# Patient Record
Sex: FEMALE | Race: WHITE | Employment: OTHER | URBAN - METROPOLITAN AREA
[De-identification: names, ages, dates, MRNs, and addresses within clinical notes are randomized per-mention and may not be internally consistent; named-entity substitution may affect disease eponyms.]

---

## 2017-04-13 ENCOUNTER — OFFICE VISIT (OUTPATIENT)
Dept: FAMILY MEDICINE | Facility: CLINIC | Age: 57
End: 2017-04-13

## 2017-04-13 VITALS
TEMPERATURE: 98.2 F | HEART RATE: 76 BPM | WEIGHT: 140 LBS | HEIGHT: 64 IN | OXYGEN SATURATION: 98 % | BODY MASS INDEX: 23.9 KG/M2 | RESPIRATION RATE: 16 BRPM | SYSTOLIC BLOOD PRESSURE: 110 MMHG | DIASTOLIC BLOOD PRESSURE: 70 MMHG

## 2017-04-13 DIAGNOSIS — Z12.31 ENCOUNTER FOR SCREENING MAMMOGRAM FOR BREAST CANCER: ICD-10-CM

## 2017-04-13 DIAGNOSIS — Z01.411 ENCOUNTER FOR GYNECOLOGICAL EXAMINATION WITH ABNORMAL FINDING: Primary | ICD-10-CM

## 2017-04-13 DIAGNOSIS — Z13.1 SCREENING FOR DIABETES MELLITUS: ICD-10-CM

## 2017-04-13 DIAGNOSIS — Z11.59 NEED FOR HEPATITIS C SCREENING TEST: ICD-10-CM

## 2017-04-13 DIAGNOSIS — N95.1 SYMPTOMATIC MENOPAUSAL OR FEMALE CLIMACTERIC STATES: ICD-10-CM

## 2017-04-13 LAB
HBA1C MFR BLD: 5.2 % (ref 4–6)
HEMOGLOBIN: 14.7 G/DL (ref 11.7–15.7)

## 2017-04-13 PROCEDURE — 85018 HEMOGLOBIN: CPT | Performed by: FAMILY MEDICINE

## 2017-04-13 PROCEDURE — 99396 PREV VISIT EST AGE 40-64: CPT | Performed by: FAMILY MEDICINE

## 2017-04-13 PROCEDURE — 88142 CYTOPATH C/V THIN LAYER: CPT | Mod: 90 | Performed by: FAMILY MEDICINE

## 2017-04-13 PROCEDURE — 36415 COLL VENOUS BLD VENIPUNCTURE: CPT | Performed by: FAMILY MEDICINE

## 2017-04-13 PROCEDURE — 86803 HEPATITIS C AB TEST: CPT | Mod: 90 | Performed by: FAMILY MEDICINE

## 2017-04-13 PROCEDURE — 83036 HEMOGLOBIN GLYCOSYLATED A1C: CPT | Performed by: FAMILY MEDICINE

## 2017-04-13 NOTE — PROGRESS NOTES
"SUBJECTIVE:  Gaby Aguilar is an 56 year old  postmenopausal woman   who presents for annual gyn exam. Menopause at age 49. No   bleeding, spotting, or discharge noted.     Estrogen replacement therapy: none   MARY exposure: no  History of abnormal Pap smear: No  Family history of uterine or ovarian cancer: No  Regular self breast exam: Yes  History of abnormal mammogram: No  Family history of breast cancer: No  History of abnormal lipids: Yes: low HDL    Past Medical History:  No date: NO ACTIVE PROBLEMS      Family History    CANCER Mother     Comment: presacral cancer    C.A.D. No family hx of     DIABETES Maternal Grandmother     Hypertension Mother     Hypertension Father     CEREBROVASCULAR DISEASE No family hx of     Breast Cancer Maternal Aunt     Cancer - colorectal No family hx of     Prostate Cancer Father        Past Surgical History:  1978: HC CORRECT BUNION,SIMPLE  age 20: HC TOOTH EXTRACTION W/FORCEP    Current Outpatient Prescriptions:  VITAMIN E NATURAL PO   BIOTIN FORTE PO   Calcium Carbonate-Vitamin D (CALCIUM + D) 600-200 MG-UNIT per tablet   fish oil-omega-3 fatty acids (FISH OIL) 1000 MG capsule   Multiple Vitamins-Calcium (ONE-A-DAY WOMENS FORMULA) TABS   No current facility-administered medications for this visit.    -- Sulfa Drugs -- Hives       Smoking status: Never Smoker    Smokeless tobacco: Never Used    Alcohol use Yes  0.5 oz/week    1 drink(s) per week         Comment: moderate       Review Of Systems  Ears/Nose/Throat: allergies year around  Respiratory: No shortness of breath, dyspnea on exertion, cough, or hemoptysis  Cardiovascular: negative  Gastrointestinal: negative and normal colonoscopy  Genitourinary: negative    OBJECTIVE:  /70 (BP Location: Right arm, Patient Position: Chair, Cuff Size: Adult Large)  Pulse 76  Temp 98.2  F (36.8  C) (Oral)  Resp 16  Ht 1.619 m (5' 3.75\")  Wt 63.5 kg (140 lb)  LMP 2010  SpO2 98%  BMI 24.22 kg/m2  General appearance: " healthy, alert, no distress, cooperative and smiling  Skin: Skin color, texture, turgor normal. No rashes or lesions., positives: dry  Ears: negative  Nose/Sinuses: Nares normal. Septum midline. Mucosa normal. No drainage or sinus tenderness.  Oropharynx: Lips, mucosa, and tongue normal. Teeth and gums normal.  Neck: Neck supple. No adenopathy. Thyroid symmetric, normal size,, Carotids without bruits.  Lungs: negative, Percussion normal. Good diaphragmatic excursion. Lungs clear  Heart: negative, PMI normal. No lifts, heaves, or thrills. RRR. No murmurs, clicks gallops or rub  Breasts: Inspection negative. No nipple discharge or bleeding. No masses.  Abdomen: Abdomen soft, non-tender. BS normal. No masses, organomegaly  Pelvic: External genitalia and vagina normal. Bimanual and rectovaginal normal., positive findings:  vaginal mucosa atrophy  BMI : Body mass index is 24.22 kg/(m^2).    ASSESSMENT:(Z01.411) Encounter for gynecological examination with abnormal finding  (primary encounter diagnosis)  Plan: ThinPrep Pap and HPV (mRNA E6/E7)         ([facility]), CL AFF HEMOGLOBIN (BFP), VENOUS         COLLECTION        Total calcium intake of 1500 mgm/day, vitamin D 400-800IU/day and a regular weight bearing exercise program for prevention of osteoporosis is recommended treatment at this time.      (N95.1) Symptomatic menopausal or female climacteric states  Plan: VITAMIN E NATURAL PO, BIOTIN FORTE PO        I have reviewed the patient's medical history in detail and updated the computerized patient record.      (Z12.31) Encounter for screening mammogram for breast cancer  Plan: Mammo Screening digital (bilat), RADIOLOGY         REFERRAL            (Z13.1) Screening for diabetes mellitus  Plan: Hemoglobin A1c (BFP), VENOUS COLLECTION            (Z11.59) Need for hepatitis C screening test  Plan: Hepatits C antibody ([facility]), VENOUS COLLECTION                Dx:  1)  Pap smear, mammogram  2)  Lipids at appropriate  intervals  PE:  Reviewed health maintenance including diet, regular exercise,   estrogen replacement and periodic exams.

## 2017-04-13 NOTE — MR AVS SNAPSHOT
After Visit Summary   4/13/2017    aGby Aguilar    MRN: [id number]           Patient Information     Date Of Birth          1960        Visit Information        Provider Department      4/13/2017 9:00 AM Reshma Enriquez MD [facility]        Today's Diagnoses     Encounter for gynecological examination with abnormal finding    -  1    Symptomatic menopausal or female climacteric states        Encounter for screening mammogram for breast cancer        Screening for diabetes mellitus        Need for hepatitis C screening test          Care Instructions    Total calcium intake of 1500 mgm/day, vitamin D 400-800IU/day and a regular weight bearing exercise program for prevention of osteoporosis is recommended treatment at this time.            Follow-ups after your visit        Additional Services     RADIOLOGY REFERRAL       Your provider has referred you to: N: [facility] [place] [phone]   [email or web address]    Please be aware that coverage of these services is subject to the terms and limitations of your health insurance plan.  Call member services at your health plan with any benefit or coverage questions.      Please bring the following to your appointment:    >>   Any x-rays, CTs or MRIs which have been performed.  Contact the facility where they were done to arrange for  prior to your scheduled appointment.    >>   List of current medications   >>   This referral request   >>   Any documents/labs given to you for this referral    Prior authorization is required for MRI/MRA, CT, Dexa Scans and Worker's Compensation cases.                  Follow-up notes from your care team     Return in about 1 year (around 4/13/2018).      Future tests that were ordered for you today     Open Future Orders        Priority Expected Expires Ordered    Mammo Screening digital (bilat) Routine  4/13/2018 4/13/2017            Who to contact     If you have  "questions or need follow up information about today's clinic visit or your schedule please contact [facility] directly at [phone].  Normal or non-critical lab and imaging results will be communicated to you by MyChart, letter or phone within 4 business days after the clinic has received the results. If you do not hear from us within 7 days, please contact the clinic through [employer] or phone. If you have a critical or abnormal lab result, we will notify you by phone as soon as possible.  Submit refill requests through oBaz or call your pharmacy and they will forward the refill request to us. Please allow 3 business days for your refill to be completed.          Additional Information About Your Visit        [employer]Lab Information     oBaz gives you secure access to your electronic health record. If you see a primary care provider, you can also send messages to your care team and make appointments. If you have questions, please call your primary care clinic.  If you do not have a primary care provider, please call [phone] and they will assist you.        Care EveryWhere ID     This is your Care EveryWhere ID. This could be used by other organizations to access your [place] medical records  [id number]        Your Vitals Were     Pulse Temperature Respirations Height Last Period Pulse Oximetry    76 98.2  F (36.8  C) (Oral) 16 1.619 m (5' 3.75\") 05/26/2010 98%    BMI (Body Mass Index)                   24.22 kg/m2            Blood Pressure from Last 3 Encounters:   04/13/17 110/70   10/05/15 114/72   07/27/15 118/78    Weight from Last 3 Encounters:   04/13/17 63.5 kg (140 lb)   10/05/15 63.5 kg (140 lb)   07/27/15 63.5 kg (140 lb)              We Performed the Following     CL AFF HEMOGLOBIN (BFP)     Hemoglobin A1c (BFP)     Hepatits C antibody ([facility])     RADIOLOGY REFERRAL     ThinPrep Pap and HPV (mRNA E6/E7){HPV-REFLEX} ([facility])     VENOUS COLLECTION        Primary Care " Provider Office Phone # Fax #    Reshma Enriquez [phone]       [facility] [address]  [facility] [address]        Thank you!     Thank you for choosing [facility]  for your care. Our goal is always to provide you with excellent care. Hearing back from our patients is one way we can continue to improve our services. Please take a few minutes to complete the written survey that you may receive in the mail after your visit with us. Thank you!             Your Updated Medication List - Protect others around you: Learn how to safely use, store and throw away your medicines at www.disposemymeds.org.          This list is accurate as of: 4/13/17 10:00 AM.  Always use your most recent med list.                   Brand Name Dispense Instructions for use    BIOTIN FORTE PO          calcium + D 600-200 MG-UNIT Tabs   Generic drug:  calcium carbonate-vitamin D      Take 2 tablets by mouth 2 times daily.       fish oil-omega-3 fatty acids 1000 MG capsule      Take 2 g by mouth daily.       ONE-A-DAY WOMENS FORMULA Tabs      Take 1 tablet by mouth daily.       VITAMIN E NATURAL PO

## 2017-04-13 NOTE — PATIENT INSTRUCTIONS
Total calcium intake of 1500 mgm/day, vitamin D 400-800IU/day and a regular weight bearing exercise program for prevention of osteoporosis is recommended treatment at this time.

## 2017-04-13 NOTE — NURSING NOTE
Patient is here for a full physical exam and pap.  Pre-Visit Screening :  Immunizations : up to date    Colonoscopy : is up to date  Mammogram : is due and to be scheduled by patient for later completion  Asthma Action Plan/Test : na  PHQ9/GAD7 : na  Pulse - regular  Medication Reconciliation: complete    BP done on the right arm, with a lg sized cuff.  Pulse - regular  My Chart - accepts    CLASSIFICATION OF OVERWEIGHT AND OBESITY BY BMI                         Obesity Class           BMI(kg/m2)  Underweight                                    < 18.5  Normal                                         18.5-24.9  Overweight                                     25.0-29.9  OBESITY                     I                  30.0-34.9                              II                 35.0-39.9  EXTREME OBESITY             III                >40                             Patient's  BMI Body mass index is 24.22 kg/(m^2).  http://hin.nhlbi.nih.gov/menuplanner/menu.cgi  Questioned patient about current smoking habits.  Pt. has never smoked.  ETOH screening:  Questions:  1-How often do you have a drink containing alcohol?                             1 times per week(s)  2-How many drinks containing alcohol do you have on a typical day when you are         Drinking?                              1   3- How often do you have 5 or more drinks on one occasion?                              never per never    Have you ever:  @None of the patient's responses to the CAGE screening were positive / Negative CAGE score@

## 2017-04-14 LAB
HCV AB - QUEST: NORMAL
SIGNAL TO CUT OFF - QUEST: 0.12

## 2017-04-18 LAB
CLINICAL HISTORY - QUEST: NORMAL
CYTOTECHNOLOGIST - QUEST: NORMAL
DESCRIPTIVE DIAGNOSIS - QUEST: NORMAL
LAST PAP DX - QUEST: NORMAL
LMP - QUEST: NORMAL
PREV BX DX - QUEST: NORMAL
SOURCE: NORMAL
STATEMENT OF ADEQUACY - QUEST: NORMAL

## 2017-07-13 ENCOUNTER — MYC MEDICAL ADVICE (OUTPATIENT)
Dept: FAMILY MEDICINE | Facility: CLINIC | Age: 57
End: 2017-07-13

## 2017-12-23 ENCOUNTER — HEALTH MAINTENANCE LETTER (OUTPATIENT)
Age: 57
End: 2017-12-23

## 2017-12-30 ENCOUNTER — TRANSFERRED RECORDS (OUTPATIENT)
Dept: FAMILY MEDICINE | Facility: CLINIC | Age: 57
End: 2017-12-30

## 2018-03-16 ENCOUNTER — OFFICE VISIT (OUTPATIENT)
Dept: FAMILY MEDICINE | Facility: CLINIC | Age: 58
End: 2018-03-16

## 2018-03-16 VITALS
SYSTOLIC BLOOD PRESSURE: 136 MMHG | HEIGHT: 64 IN | RESPIRATION RATE: 20 BRPM | WEIGHT: 142.4 LBS | DIASTOLIC BLOOD PRESSURE: 82 MMHG | TEMPERATURE: 97.8 F | BODY MASS INDEX: 24.31 KG/M2 | HEART RATE: 76 BPM

## 2018-03-16 DIAGNOSIS — R10.2 PELVIC PAIN IN FEMALE: Primary | ICD-10-CM

## 2018-03-16 DIAGNOSIS — R93.5 ABNORMAL ENDOMETRIAL ULTRASOUND: ICD-10-CM

## 2018-03-16 DIAGNOSIS — Z12.31 ENCOUNTER FOR SCREENING MAMMOGRAM FOR BREAST CANCER: ICD-10-CM

## 2018-03-16 LAB
% GRANULOCYTES: 70.6 %
ALBUMIN (URINE): ABNORMAL MG/DL
APPEARANCE UR: CLEAR
BACTERIA, UR: ABNORMAL
BILIRUB UR QL: ABNORMAL
CASTS/LPF: ABNORMAL
COLOR UR: YELLOW
EP/HPF: ABNORMAL
ERYTHROCYTE [SEDIMENTATION RATE] IN BLOOD: 10 MM/HR (ref 0–20)
GLUCOSE URINE: ABNORMAL MG/DL
HCT VFR BLD AUTO: 42.4 % (ref 35–47)
HEMOGLOBIN: 14 G/DL (ref 11.7–15.7)
HGB UR QL: ABNORMAL
KETONES UR QL: ABNORMAL MG/DL
LEUKOCYTE ESTERASE - QUEST: ABNORMAL
LYMPHOCYTES NFR BLD AUTO: 22.7 %
MAMMOGRAM: NORMAL
MCH RBC QN AUTO: 31 PG (ref 26–33)
MCHC RBC AUTO-ENTMCNC: 33 G/DL (ref 31–36)
MCV RBC AUTO: 94.1 FL (ref 78–100)
MISC.: ABNORMAL
MONOCYTES NFR BLD AUTO: 6.7 %
NITRITE UR QL STRIP: ABNORMAL
PH UR STRIP: 6 PH (ref 5–7)
PLATELET COUNT - QUEST: 317 10^9/L (ref 150–375)
RBC # BLD AUTO: 4.51 10*12/L (ref 3.8–5.2)
RBC, UR MICRO: ABNORMAL (ref ?–2)
SP. GRAVITY: >=1.03
UROBILINOGEN UR QL STRIP: 0.2 EU/DL (ref 0.2–1)
WBC # BLD AUTO: 5.9 10*9/L (ref 4–11)
WBC, UR MICRO: ABNORMAL (ref ?–2)

## 2018-03-16 PROCEDURE — 85025 COMPLETE CBC W/AUTO DIFF WBC: CPT | Performed by: FAMILY MEDICINE

## 2018-03-16 PROCEDURE — 99214 OFFICE O/P EST MOD 30 MIN: CPT | Performed by: FAMILY MEDICINE

## 2018-03-16 PROCEDURE — 81001 URINALYSIS AUTO W/SCOPE: CPT | Performed by: FAMILY MEDICINE

## 2018-03-16 PROCEDURE — 36415 COLL VENOUS BLD VENIPUNCTURE: CPT | Performed by: FAMILY MEDICINE

## 2018-03-16 PROCEDURE — 85651 RBC SED RATE NONAUTOMATED: CPT | Performed by: FAMILY MEDICINE

## 2018-03-16 NOTE — PROGRESS NOTES
"SUBJECTIVE:  57 year old  female presents with the following concern:    Lower abdominal discomfort, described as Cramps  Intermittent  Feels like pressure  Painful jabs on occasion  Not severe enough to wake from sleep  No change in appetite  No urinary symptoms  No vaginal bleeding or discharge  No fever or chills  No change in bowel habits  Normal colonoscopy in 2011  Symptoms are not getting worse- FEW MONTH DURATION  Frequency: intermittent, Three days this week       Patient Active Problem List   Diagnosis     Symptomatic menopausal or female climacteric states     Allergic rhinitis     Absence of menstruation     Health Care Home     ACP (advance care planning)     Diffuse cystic mastopathy     Atrophic vaginitis     Past Surgical History:   Procedure Laterality Date     HC CORRECT BUNION,SIMPLE  1978     HC TOOTH EXTRACTION W/FORCEP  age 20     Current Outpatient Prescriptions   Medication     VITAMIN E NATURAL PO     BIOTIN FORTE PO     Calcium Carbonate-Vitamin D (CALCIUM + D) 600-200 MG-UNIT per tablet     fish oil-omega-3 fatty acids (FISH OIL) 1000 MG capsule     Multiple Vitamins-Calcium (ONE-A-DAY WOMENS FORMULA) TABS     No current facility-administered medications for this visit.         ROS: 7 point ROS neg other than the symptoms noted above in the HPI.    OBJECTIVE:  /82 (BP Location: Left arm, Patient Position: Chair, Cuff Size: Adult Regular)  Pulse 76  Temp 97.8  F (36.6  C) (Oral)  Resp 20  Ht 1.619 m (5' 3.75\")  Wt 64.6 kg (142 lb 6.4 oz)  LMP 05/26/2010  BMI 24.63 kg/m2  Regular rate and  rhythm. S1 and S2 normal, no murmurs, clicks, gallops or rubs. No edema or JVD. Chest is clear; no wheezes or rales.  The abdomen is soft without  guarding, mass or organomegaly. Bowel sounds are normal. Mild tenderness to palpation left lower quadrant  No pelvic exam today:  Last pap and pelvic 4/2017    UA: normal but for concentrated urine  Normal CBC- WBC 5900  ESR: 10    Pelvic US: " Abnormally thickened endometrium with scattered calcifications. Although no gross tumor is seen, endometrial biopsy  is suggested for further evaluation.  2. Atrophic ovaries with calcification in the right ovary of indeterminate etiology.    Assessment   Pelvic cramping: abnormal pelvic US    PLAN:  Patient has been notified of her results and need for further evaluation including endometrial biopsy  [facility]

## 2018-03-16 NOTE — MR AVS SNAPSHOT
After Visit Summary   3/16/2018    Gaby Aguilar    MRN: [id number]           Patient Information     Date Of Birth          1960        Visit Information        Provider Department      3/16/2018 8:45 AM Isabella Abebe MD [facility]        Today's Diagnoses     Pelvic pain in female    -  1    Abnormal endometrial ultrasound        Encounter for screening mammogram for breast cancer           Follow-ups after your visit        Additional Services     OB/GYN REFERRAL       Your provider has referred you to:  [facility] [place] [phone]   [email or web address]    PLEASE FAX OFFICE NOTE AND PELVIC ULTRASOUND RESULTS   PATIENT NEEDS TO BE SCHEDULED FOR ENDOMETRIAL BIOPSY AND CONSULT  I TOLD PATIENT THAT THE OFFICE WOULD CALL HER TO SCHEDULE  [phone] (home) [phone] (work)      Please be aware that coverage of these services is subject to the terms and limitations of your health insurance plan.  Call member services at your health plan with any benefit or coverage questions.      Please bring the following with you to your appointment:    (1) Any X-Rays, CTs or MRIs which have been performed.  Contact the facility where they were done to arrange for  prior to your scheduled appointment.   (2) List of current medications   (3) This referral request   (4) Any documents/labs given to you for this referral                  Who to contact     If you have questions or need follow up information about today's clinic visit or your schedule please contact [facility] directly at [phone].  Normal or non-critical lab and imaging results will be communicated to you by MyChart, letter or phone within 4 business days after the clinic has received the results. If you do not hear from us within 7 days, please contact the clinic through MyChart or phone. If you have a critical or abnormal lab result, we will  "notify you by phone as soon as possible.  Submit refill requests through Auramist or call your pharmacy and they will forward the refill request to us. Please allow 3 business days for your refill to be completed.          Additional Information About Your Visit        [email or web address] Information     [employer]ist gives you secure access to your electronic health record. If you see a primary care provider, you can also send messages to your care team and make appointments. If you have questions, please call your primary care clinic.  If you do not have a primary care provider, please call [phone] and they will assist you.        Care EveryWhere ID     This is your Care EveryWhere ID. This could be used by other organizations to access your [place] medical records  [id number]        Your Vitals Were     Pulse Temperature Respirations Height Last Period BMI (Body Mass Index)    76 97.8  F (36.6  C) (Oral) 20 1.619 m (5' 3.75\") 05/26/2010 24.63 kg/m2       Blood Pressure from Last 3 Encounters:   03/16/18 136/82   04/13/17 110/70   10/05/15 114/72    Weight from Last 3 Encounters:   03/16/18 64.6 kg (142 lb 6.4 oz)   04/13/17 63.5 kg (140 lb)   10/05/15 63.5 kg (140 lb)              We Performed the Following     CL AFF HEMOGRAM/PLATE/DIFF (BFP)     ESR, WESTERGREN (BFP)     HCL  Urinalysis, Routine (BFP)     Mammo Screening digital (bilat)     OB/GYN REFERRAL     US Pelvic Complete with Transvaginal     VENOUS COLLECTION        Primary Care Provider Office Phone # Fax #    Reshma Enriquez [phone] [address] [address]        Equal Access to Services     [facility]: Hadii britton Ferrell, wacalda luqadaha, qaybta kaalmada pricila, romero mcnulty. So [facility] [phone].    ATENCIÓN: Si habla español, tiene a tillman disposición servicios gratuitos de asistencia lingüística. Llame al [phone].    We comply with applicable federal civil " rights laws and [place] laws. We do not discriminate on the basis of race, color, national origin, age, disability, sex, sexual orientation, or gender identity.            Thank you!     Thank you for choosing [facility]  for your care. Our goal is always to provide you with excellent care. Hearing back from our patients is one way we can continue to improve our services. Please take a few minutes to complete the written survey that you may receive in the mail after your visit with us. Thank you!             Your Updated Medication List - Protect others around you: Learn how to safely use, store and throw away your medicines at www.disposemymeds.org.          This list is accurate as of 3/16/18  6:02 PM.  Always use your most recent med list.                   Brand Name Dispense Instructions for use Diagnosis    BIOTIN FORTE PO       Symptomatic menopausal or female climacteric states       calcium + D 600-200 MG-UNIT Tabs   Generic drug:  calcium carbonate-vitamin D      Take 2 tablets by mouth 2 times daily.        fish oil-omega-3 fatty acids 1000 MG capsule      Take 2 g by mouth daily.        ONE-A-DAY WOMENS FORMULA Tabs      Take 1 tablet by mouth daily.        VITAMIN E NATURAL PO       Symptomatic menopausal or female climacteric states

## 2018-03-16 NOTE — NURSING NOTE
Gaby Aguilar is here for abdominal pain for the past 4 months. States that it is now radiating into her back at times. She states that she had about 1 week of spotting as well.    Questioned patient about current smoking habits.  Pt. has never smoked.  PULSE regular  My Chart: active  CLASSIFICATION OF OVERWEIGHT AND OBESITY BY BMI                        Obesity Class           BMI(kg/m2)  Underweight                                    < 18.5  Normal                                         18.5-24.9  Overweight                                     25.0-29.9  OBESITY                     I                  30.0-34.9                             II                 35.0-39.9  EXTREME OBESITY             III                >40                            Patient's  BMI Body mass index is 24.63 kg/(m^2).  http://hin.nhlbi.nih.gov/menuplanner/menu.cgi  Pre-visit planning  Immunizations - up to date  Colonoscopy - is up to date  Mammogram - is completed today  Asthma -   PHQ9 -    ARACELI-7 -

## 2018-03-16 NOTE — NURSING NOTE
Telephone call made to schedule Gaby Aguilar for the following: Ultrasound: Pelvic    Date: today  Time: 1:20  Place: [facility] [place]

## 2018-03-21 ENCOUNTER — TELEPHONE (OUTPATIENT)
Dept: FAMILY MEDICINE | Facility: CLINIC | Age: 58
End: 2018-03-21

## 2018-03-22 ENCOUNTER — TRANSFERRED RECORDS (OUTPATIENT)
Dept: FAMILY MEDICINE | Facility: CLINIC | Age: 58
End: 2018-03-22

## 2018-03-26 ENCOUNTER — OFFICE VISIT (OUTPATIENT)
Dept: FAMILY MEDICINE | Facility: CLINIC | Age: 58
End: 2018-03-26

## 2018-03-26 VITALS
WEIGHT: 143 LBS | HEIGHT: 64 IN | SYSTOLIC BLOOD PRESSURE: 126 MMHG | DIASTOLIC BLOOD PRESSURE: 84 MMHG | TEMPERATURE: 97.7 F | HEART RATE: 68 BPM | BODY MASS INDEX: 24.41 KG/M2 | OXYGEN SATURATION: 99 %

## 2018-03-26 DIAGNOSIS — N84.1 CERVICAL POLYP: ICD-10-CM

## 2018-03-26 DIAGNOSIS — Z01.818 PRE-OP EXAM: Primary | ICD-10-CM

## 2018-03-26 DIAGNOSIS — Z71.89 ACP (ADVANCE CARE PLANNING): ICD-10-CM

## 2018-03-26 LAB — HEMOGLOBIN: 14.3 G/DL (ref 11.7–15.7)

## 2018-03-26 PROCEDURE — 99214 OFFICE O/P EST MOD 30 MIN: CPT | Performed by: PHYSICIAN ASSISTANT

## 2018-03-26 PROCEDURE — 85018 HEMOGLOBIN: CPT | Performed by: PHYSICIAN ASSISTANT

## 2018-03-26 PROCEDURE — 36415 COLL VENOUS BLD VENIPUNCTURE: CPT | Performed by: PHYSICIAN ASSISTANT

## 2018-03-26 NOTE — PROGRESS NOTES
[facility]  [address]  [address]  [facility] [address]  [phone]  Dept: [phone]    PRE-OP EVALUATION:  Today's date: 3/26/2018    Gaby Aguilar (: 1960) presents for pre-operative evaluation assessment as requested by Dr. Osmany Delgado.  She requires evaluation and anesthesia risk assessment prior to undergoing surgery/procedure for treatment of cervical polyps .    Proposed Surgery/ Procedure: D & C  Date of Surgery/ Procedure: 2018  Time of Surgery/ Procedure: 8:00AM  Hospital/Surgical Facility: [facility]  Fax number for surgical facility: [phone]  Primary Physician: Reshma Enriquez  Type of Anesthesia Anticipated: General    Patient has a Health Care Directive or Living Will:  YES (On File)    1. NO - Do you have a history of heart attack, stroke, stent, bypass or surgery on an artery in the head, neck, heart or legs?  2. NO - Do you ever have any pain or discomfort in your chest?  3. NO - Do you have a history of  Heart Failure?  4. NO - Are you troubled by shortness of breath when: walking on the level, up a slight hill or at night?  5. NO - Do you currently have a cold, bronchitis or other respiratory infection?  6. NO - Do you have a cough, shortness of breath or wheezing?  7. NO - Do you sometimes get pains in the calves of your legs when you walk?  8. NO - Do you or anyone in your family have previous history of blood clots?  9. NO - Do you or does anyone in your family have a serious bleeding problem such as prolonged bleeding following surgeries or cuts?  10. NO - Have you ever had problems with anemia or been told to take iron pills?  11. NO - Have you had any abnormal blood loss such as black, tarry or bloody stools, or abnormal vaginal bleeding?  12. NO - Have you ever had a blood transfusion?  13. NO - Have you or any of your relatives ever had problems with anesthesia?  14. NO - Do you have sleep apnea,  excessive snoring or daytime drowsiness?  15. NO - Do you have any prosthetic heart valves?  16. NO - Do you have prosthetic joints?  17. NO - Is there any chance that you may be pregnant?      HPI:     HPI related to upcoming procedure: pain/pressure in the pelvic area started 5 months ago, Saw Dr. Abebe last week       See problem list for active medical problems.  Problems all longstanding and stable, except as noted/documented.  See ROS for pertinent symptoms related to these conditions.                                                                                                  .    MEDICAL HISTORY:     Patient Active Problem List    Diagnosis Date Noted     Atrophic vaginitis 02/19/2014     Priority: Medium     Diffuse cystic mastopathy 12/27/2012     Priority: Medium     Health Care Home 09/13/2012     Priority: Medium     State Tier Level:  Tier 0  Status:  n/a  Care Coordinator:  n/a     See Letters for [facility] Care Plan           ACP (advance care planning) 09/13/2012     Priority: Medium     Advance Care Planning 3/26/2018: ACP Review of Chart / Resources Provided:  Reviewed chart for advance care plan.  Gaby REGAN Rickyrexcheryl has an up to date advance care plan on file.  Added by Mary Beth Durand       Allergic rhinitis 05/27/2011     Priority: Medium     Problem list name updated by automated process. Provider to review       Absence of menstruation 05/27/2011     Priority: Medium     Symptomatic menopausal or female climacteric states 01/09/2008     Priority: Medium      Past Medical History:   Diagnosis Date     Allergic rhinitis 5/27/2011     Problem list name updated by automated process. Provider to review     Diffuse cystic mastopathy 12/27/2012     Symptomatic menopausal or female climacteric states 1/9/2008     Past Surgical History:   Procedure Laterality Date     HC CORRECT BUNION,SIMPLE Right 1978      TOOTH EXTRACTION W/FORCEP  age 20     Current Outpatient Prescriptions   Medication Sig Dispense  "Refill     VITAMIN E NATURAL PO        Calcium Carbonate-Vitamin D (CALCIUM + D) 600-200 MG-UNIT per tablet Take 2 tablets by mouth 2 times daily.       Multiple Vitamins-Calcium (ONE-A-DAY WOMENS FORMULA) TABS Take 1 tablet by mouth daily.       OTC products: ibuprofen before exam Friday last week 600 mg     Allergies   Allergen Reactions     Sulfa Drugs Hives      Latex Allergy: NO    Social History   Substance Use Topics     Smoking status: Former Smoker     Packs/day: 0.50     Years: 6.00     Quit date: 3/26/1982     Smokeless tobacco: Never Used     Alcohol use 4.2 oz/week     7 Standard drinks or equivalent per week     History   Drug Use No       REVIEW OF SYSTEMS:   Constitutional, neuro, ENT, endocrine, pulmonary, cardiac, gastrointestinal, genitourinary, musculoskeletal, integument and psychiatric systems are negative, except as otherwise noted.    EXAM:   /84 (BP Location: Left arm, Patient Position: Chair, Cuff Size: Adult Large)  Pulse 68  Temp 97.7  F (36.5  C) (Oral)  Ht 1.626 m (5' 4\")  Wt 64.9 kg (143 lb)  LMP 05/26/2010  SpO2 99%  Breastfeeding? No  BMI 24.55 kg/m2    GENERAL APPEARANCE: healthy, alert and no distress     EYES: EOMI, PERRL     HENT: ear canals and TM's normal and nose and mouth without ulcers or lesions     NECK: no adenopathy, no asymmetry, masses, or scars and thyroid normal to palpation     RESP: lungs clear to auscultation - no rales, rhonchi or wheezes     CV: regular rates and rhythm, normal S1 S2, no S3 or S4 and no murmur, click or rub     ABDOMEN:  soft, nontender, no HSM or masses and bowel sounds normal     MS: extremities normal- no gross deformities noted, no evidence of inflammation in joints, FROM in all extremities.     SKIN: no suspicious lesions or rashes     NEURO: Normal strength and tone, sensory exam grossly normal, mentation intact and speech normal     PSYCH: mentation appears normal. and affect normal/bright     LYMPHATICS: No cervical " adenopathy    DIAGNOSTICS:     Labs Resulted Today:   Results for orders placed or performed in visit on 03/26/18   CL AFF HEMOGLOBIN (BFP)   Result Value Ref Range    Hemoglobin 14.3 11.7 - 15.7 g/dL       Recent Labs   Lab Test  03/16/18   0929  04/13/17   0947  04/13/17   0945 11/09/16   05/27/10   0500   HGB  14.0   --   14.7  14.0   < >   --    PLT  317   --    --   281   --    --    NA   --    --    --   141   --   142   POTASSIUM   --    --    --   4.6   --   4.3   CR   --    --    --   0.67   --   0.69   A1C   --   5.2   --    --    --    --     < > = values in this interval not displayed.        IMPRESSION:   Reason for surgery/procedure: cervical polyp, endometrial hyperplasia  Diagnosis/reason for consult:  Preoperative clearance      The proposed surgical procedure is considered INTERMEDIATE risk.    REVISED CARDIAC RISK INDEX  The patient has the following serious cardiovascular risks for perioperative complications such as (MI, PE, VFib and 3  AV Block):  No serious cardiac risks  INTERPRETATION: 0 risks: Class I (very low risk - 0.4% complication rate)    The patient has the following additional risks for perioperative complications:  No identified additional risks      ICD-10-CM    1. Pre-op exam Z01.818 CL AFF HEMOGLOBIN (BFP)     VENOUS COLLECTION   2. ACP (advance care planning) Z71.89    3. Cervical polyp N84.1        RECOMMENDATIONS:         APPROVAL GIVEN to proceed with proposed procedure, without further diagnostic evaluation       Signed Electronically by: Jaki Ricardo PA-C    Copy of this evaluation report is provided to requesting physician.    [facility] Preop Guidelines

## 2018-03-26 NOTE — MR AVS SNAPSHOT
"              After Visit Summary   3/26/2018    Gaby Aguilar    MRN: [id number]           Patient Information     Date Of Birth          1960        Visit Information        Provider Department      3/26/2018 8:45 AM Jaki Ricardo PA [facility]        Today's Diagnoses     Pre-op exam    -  1    ACP (advance care planning)        Cervical polyp           Follow-ups after your visit        Who to contact     If you have questions or need follow up information about today's clinic visit or your schedule please contact [facility] directly at [phone].  Normal or non-critical lab and imaging results will be communicated to you by ubigratehart, letter or phone within 4 business days after the clinic has received the results. If you do not hear from us within 7 days, please contact the clinic through ubigratehart or phone. If you have a critical or abnormal lab result, we will notify you by phone as soon as possible.  Submit refill requests through [employer] or call your pharmacy and they will forward the refill request to us. Please allow 3 business days for your refill to be completed.          Additional Information About Your Visit        MyChart Information     [employer] gives you secure access to your electronic health record. If you see a primary care provider, you can also send messages to your care team and make appointments. If you have questions, please call your primary care clinic.  If you do not have a primary care provider, please call [phone] and they will assist you.        Care EveryWhere ID     This is your Care EveryWhere ID. This could be used by other organizations to access your [facility] medical records  [id number]        Your Vitals Were     Pulse Temperature Height Last Period Pulse Oximetry Breastfeeding?    68 97.7  F (36.5  C) (Oral) 1.626 m (5' 4\") 05/26/2010 99% No    BMI (Body Mass Index)                   24.55 kg/m2         "    Blood Pressure from Last 3 Encounters:   03/26/18 126/84   03/16/18 136/82   04/13/17 110/70    Weight from Last 3 Encounters:   03/26/18 64.9 kg (143 lb)   03/16/18 64.6 kg (142 lb 6.4 oz)   04/13/17 63.5 kg (140 lb)              We Performed the Following     CL AFF HEMOGLOBIN (BFP)     VENOUS COLLECTION        Primary Care Provider Office Phone # Fax #    Reshma Enriquez [phone]       [address] [address]        Equal Access to Services     [facility]: Hadii britton cameron hadasho Socleve, waaxda ludanieladaha, qaybta kaalmada pricila, romero alcantara . So [facility] [phone].    ATENCIÓN: Si habla español, tiene a tillman disposición servicios gratuitos de asistencia lingüística. [facility] [phone].    We comply with applicable federal civil rights laws and [place] laws. We do not discriminate on the basis of race, color, national origin, age, disability, sex, sexual orientation, or gender identity.            Thank you!     Thank you for choosing [facility]  for your care. Our goal is always to provide you with excellent care. Hearing back from our patients is one way we can continue to improve our services. Please take a few minutes to complete the written survey that you may receive in the mail after your visit with us. Thank you!             Your Updated Medication List - Protect others around you: Learn how to safely use, store and throw away your medicines at www.disposemymeds.org.          This list is accurate as of 3/26/18  9:39 AM.  Always use your most recent med list.                   Brand Name Dispense Instructions for use Diagnosis    calcium + D 600-200 MG-UNIT Tabs   Generic drug:  calcium carbonate-vitamin D      Take 2 tablets by mouth 2 times daily.        ONE-A-DAY WOMENS FORMULA Tabs      Take 1 tablet by mouth daily.        VITAMIN E NATURAL PO       Symptomatic menopausal or female climacteric  states

## 2018-03-30 ENCOUNTER — HOSPITAL PATHOLOGY (OUTPATIENT)
Dept: OTHER | Facility: CLINIC | Age: 58
End: 2018-03-30

## 2018-04-02 LAB — COPATH REPORT: NORMAL

## 2018-12-07 ENCOUNTER — APPOINTMENT (OUTPATIENT)
Dept: GENERAL RADIOLOGY | Facility: CLINIC | Age: 58
End: 2018-12-07
Attending: EMERGENCY MEDICINE
Payer: COMMERCIAL

## 2018-12-07 ENCOUNTER — HOSPITAL ENCOUNTER (EMERGENCY)
Facility: CLINIC | Age: 58
Discharge: HOME | End: 2018-12-07
Attending: EMERGENCY MEDICINE | Admitting: EMERGENCY MEDICINE
Payer: COMMERCIAL

## 2018-12-07 VITALS
BODY MASS INDEX: 24.55 KG/M2 | TEMPERATURE: 98.8 F | RESPIRATION RATE: 22 BRPM | WEIGHT: 143 LBS | OXYGEN SATURATION: 97 % | DIASTOLIC BLOOD PRESSURE: 97 MMHG | SYSTOLIC BLOOD PRESSURE: 151 MMHG

## 2018-12-07 DIAGNOSIS — I48.92 EPISODIC ATRIAL FLUTTER (H): ICD-10-CM

## 2018-12-07 LAB
ANION GAP SERPL CALCULATED.3IONS-SCNC: 5 MMOL/L (ref 3–14)
BUN SERPL-MCNC: 16 MG/DL (ref 7–30)
CALCIUM SERPL-MCNC: 9.8 MG/DL (ref 8.5–10.1)
CHLORIDE SERPL-SCNC: 107 MMOL/L (ref 94–109)
CO2 SERPL-SCNC: 28 MMOL/L (ref 20–32)
CREAT SERPL-MCNC: 0.7 MG/DL (ref 0.52–1.04)
ERYTHROCYTE [DISTWIDTH] IN BLOOD BY AUTOMATED COUNT: 12.2 % (ref 10–15)
GFR SERPL CREATININE-BSD FRML MDRD: 86 ML/MIN/1.7M2
GLUCOSE SERPL-MCNC: 103 MG/DL (ref 70–99)
HCG SERPL QL: NEGATIVE
HCT VFR BLD AUTO: 42.2 % (ref 35–47)
HGB BLD-MCNC: 14.2 G/DL (ref 11.7–15.7)
MAGNESIUM SERPL-MCNC: 2.4 MG/DL (ref 1.6–2.3)
MCH RBC QN AUTO: 31.4 PG (ref 26.5–33)
MCHC RBC AUTO-ENTMCNC: 33.6 G/DL (ref 31.5–36.5)
MCV RBC AUTO: 93 FL (ref 78–100)
PLATELET # BLD AUTO: 337 10E9/L (ref 150–450)
POTASSIUM SERPL-SCNC: 3.5 MMOL/L (ref 3.4–5.3)
RBC # BLD AUTO: 4.52 10E12/L (ref 3.8–5.2)
SODIUM SERPL-SCNC: 140 MMOL/L (ref 133–144)
TROPONIN I SERPL-MCNC: <0.015 UG/L (ref 0–0.04)
TSH SERPL DL<=0.005 MIU/L-ACNC: 1.58 MU/L (ref 0.4–4)
WBC # BLD AUTO: 9.5 10E9/L (ref 4–11)

## 2018-12-07 PROCEDURE — 93005 ELECTROCARDIOGRAM TRACING: CPT | Mod: 76

## 2018-12-07 PROCEDURE — 93005 ELECTROCARDIOGRAM TRACING: CPT

## 2018-12-07 PROCEDURE — 85027 COMPLETE CBC AUTOMATED: CPT | Performed by: EMERGENCY MEDICINE

## 2018-12-07 PROCEDURE — 84484 ASSAY OF TROPONIN QUANT: CPT | Performed by: EMERGENCY MEDICINE

## 2018-12-07 PROCEDURE — 71046 X-RAY EXAM CHEST 2 VIEWS: CPT

## 2018-12-07 PROCEDURE — 25000132 ZZH RX MED GY IP 250 OP 250 PS 637: Performed by: EMERGENCY MEDICINE

## 2018-12-07 PROCEDURE — 84703 CHORIONIC GONADOTROPIN ASSAY: CPT | Performed by: EMERGENCY MEDICINE

## 2018-12-07 PROCEDURE — 99285 EMERGENCY DEPT VISIT HI MDM: CPT

## 2018-12-07 PROCEDURE — 84443 ASSAY THYROID STIM HORMONE: CPT | Performed by: EMERGENCY MEDICINE

## 2018-12-07 PROCEDURE — 83735 ASSAY OF MAGNESIUM: CPT | Performed by: EMERGENCY MEDICINE

## 2018-12-07 PROCEDURE — 80048 BASIC METABOLIC PNL TOTAL CA: CPT | Performed by: EMERGENCY MEDICINE

## 2018-12-07 RX ORDER — METOPROLOL SUCCINATE 25 MG/1
25 TABLET, EXTENDED RELEASE ORAL DAILY
Qty: 30 TABLET | Refills: 0 | Status: SHIPPED | OUTPATIENT
Start: 2018-12-07 | End: 2018-12-19

## 2018-12-07 RX ORDER — METOPROLOL TARTRATE 25 MG/1
25 TABLET, FILM COATED ORAL ONCE
Status: COMPLETED | OUTPATIENT
Start: 2018-12-07 | End: 2018-12-07

## 2018-12-07 RX ADMIN — RIVAROXABAN 20 MG: 20 TABLET, FILM COATED ORAL at 19:29

## 2018-12-07 RX ADMIN — METOPROLOL TARTRATE 25 MG: 25 TABLET ORAL at 17:36

## 2018-12-07 ASSESSMENT — ENCOUNTER SYMPTOMS
NECK STIFFNESS: 1
PALPITATIONS: 1
SHORTNESS OF BREATH: 0

## 2018-12-07 NOTE — ED TRIAGE NOTES
Pt arrives with c/o fast HR. Pt seen at  and sent here. Pt in aflutter 120s. States she's had symptoms of dizziness, SOB and irregular heart beat for a few days. No CP. ABC intact.

## 2018-12-07 NOTE — ED AVS SNAPSHOT
[facility] Emergency Department    [address]    [facility] [address]    Phone:  [phone]    Fax:  [phone]                                       Gaby Aguilar   MRN: [id number]    Department:  [facility] Emergency Department   Date of Visit:  12/7/2018           Patient Information     Date Of Birth          1960        Your diagnoses for this visit were:     Episodic atrial flutter (H)        You were seen by Kem Wallace MD.      Follow-up Information     Follow up with Reshma Enriquez MD. Schedule an appointment as soon as possible for a visit in 3 days.    Specialty:  Family Practice    Contact information:    [address]  [facility] [address] [phone]          Discharge Instructions         I am going to have you follow-up with the cardiologist.  They will call you to arrange an appointment.  I will also have you follow-up for an outpatient Holter monitor where we will monitor your heart rhythm over the next 1 week.  I am starting you on blood thinners given the risk of stroke associated with the atrial flutter.    Atrial Flutter    Atrial flutter means that your heart is beating very fast. It is caused by a problem in the electrical pathways of the heart. It can be a sign of heart disease or other health problems that affect your heart.  Palpitations are the most common symptom of atrial flutter. This is the feeling that your heart is fluttering or beating fast or hard. When your heart beats too fast, it doesn t pump blood very well. This can cause other symptoms, including:    Anxiety    Fatigue    Shortness of breath    Chest pain    Dizziness    Fainting  If this is the first time you ve had atrial flutter, and you don t have heart or lung disease, it may never happen again. But in most cases, atrial flutter comes and goes. It can last from a few hours to a couple of days. Sometimes the atrial flutter doesn t ever go  away. This is chronic atrial flutter.  Atrial flutter may be caused by heart disease. It may also be caused by other conditions that affect the heart:    Coronary artery disease (arteriosclerosis)    High blood pressure    Disease of the heart valves    Enlarged heart  Atrial flutter can occur without heart disease. This may be because of:    Overactive thyroid (hyperthyroid)    Chronic lung disease (COPD, emphysema, or bronchitis)    Alcohol use    Drugs or medicines that stimulate the heart. These include cocaine, amphetamines, diet pills, some decongestant cold medicines, caffeine, and nicotine.    Infection  Treating or removing these causes will make it more likely that treatment for atrial flutter will work. It will also make it less likely that atrial flutter will come back.  Atrial flutter can happen along with another abnormal rhythm called atrial fibrillation. The risk for stroke is higher with these conditions. Getting treatment can reduce your risk.  Home care  Follow these guidelines when caring for yourself at home:    Go back to your usual activities as soon as you feel back to normal.    If you smoke, stop smoking. Talk with your healthcare provider or call a local stop-smoking program for help.    Don t take drugs or medicines that stimulate your heart. These include cocaine, amphetamines, diet pills, some decongestant cold medicines, caffeine, and nicotine.    Your provider may have prescribed medicine to stop atrial fibrillation from coming back. Take this medicine exactly as directed. Some medicines must be taken every day to work as they should.    Your provider may also have prescribed warfarin to lower your risk for stroke. Have your blood tested regularly as advised by your healthcare provider. This will help make sure the dose is right for you.  Follow-up care  Follow up with your healthcare provider, or as advised.  When should I call my healthcare provider?  Call your healthcare provider  right away if any of these occur:    Shortness of breath gets worse    Swelling in either leg    Unexpected weight gain    Chest pain or pressure    Near fainting or fainting    You feel like your heart is fluttering, or beating fast or hard    Fever of 100.4 F (38 C) or higher, or as directed by your healthcare provider    Cough with dark-colored or bloody mucus  Also call your provider right away if you have signs of stroke:    Weakness or numbness of an arm or leg or one side of the face    Difficulty speaking or seeing    Extreme drowsiness, confusion, dizziness, or fainting   Date Last Reviewed: 5/1/2016 2000-2018 [employer]. [address] [place], [place] [id number]. All rights reserved. This information is not intended as a substitute for professional medical care. Always follow your healthcare professional's instructions.          Your next 10 appointments already scheduled     Dec 17, 2018  9:15 AM CST   Pre-Op physical with Reshma Enriquez MD   [facility] ([facility])    [address]  [address]  [facility] [address] [phone]              24 Hour Appointment Hotline       To make an appointment at any [facility] clinic, call [phone] ([phone]). If you don't have a family doctor or clinic, we will help you find one. [facility] clinics are conveniently located to serve the needs of you and your family.          ED Discharge Orders     Follow-Up with Cardiologist           Yvonne Richardson Holter                    Review of your medicines      START taking        Dose / Directions Last dose taken    metoprolol succinate ER 25 MG 24 hr tablet   Commonly known as:  TOPROL-XL   Dose:  25 mg   Quantity:  30 tablet        Take 1 tablet (25 mg) by mouth daily   Refills:  0        rivaroxaban ANTICOAGULANT 20 MG Tabs tablet   Commonly known as:  XARELTO   Dose:  20 mg   Quantity:  14 tablet   Start taking on:  12/8/2018        Take 1  tablet (20 mg) by mouth daily (with dinner) for 14 days   Refills:  0          Our records show that you are taking the medicines listed below. If these are incorrect, please call your family doctor or clinic.        Dose / Directions Last dose taken    calcium + D 600-200 MG-UNIT Tabs   Dose:  2 tablet   Generic drug:  calcium carbonate-vitamin D        Take 2 tablets by mouth 2 times daily.   Refills:  0        ONE-A-DAY WOMENS FORMULA Tabs   Dose:  1 tablet        Take 1 tablet by mouth daily.   Refills:  0        VITAMIN E NATURAL PO        Refills:  0                Prescriptions were sent or printed at these locations (2 Prescriptions)                   Other Prescriptions                Printed at Department/Unit printer (2 of 2)         metoprolol succinate ER (TOPROL-XL) 25 MG 24 hr tablet               rivaroxaban ANTICOAGULANT (XARELTO) 20 MG TABS tablet                Procedures and tests performed during your visit     Basic metabolic panel (BMP)    CBC (platelets, no diff)    Chest XR,  PA & LAT    EKG 12 lead    HCG QUALitative pregnancy (blood)    Magnesium    Peripheral IV catheter    TSH with free T4 reflex    Troponin I      Orders Needing Specimen Collection     None      Pending Results     No orders found from 12/5/2018 to 12/8/2018.            Pending Culture Results     No orders found from 12/5/2018 to 12/8/2018.            Pending Results Instructions     If you had any lab results that were not finalized at the time of your Discharge, you can call the ED Lab Result RN at [phone]. You will be contacted by this team for any positive Lab results or changes in treatment. The nurses are available 7 days a week from 10A to 6:30P.  You can leave a message 24 hours per day and they will return your call.        Test Results From Your Hospital Stay        12/7/2018  5:50 PM      Component Results     Component Value Ref Range & Units Status    WBC 9.5 4.0 - 11.0 10e9/L Final    RBC Count 4.52  3.8 - 5.2 10e12/L Final    Hemoglobin 14.2 11.7 - 15.7 g/dL Final    Hematocrit 42.2 35.0 - 47.0 % Final    MCV 93 78 - 100 fl Final    MCH 31.4 26.5 - 33.0 pg Final    MCHC 33.6 31.5 - 36.5 g/dL Final    RDW 12.2 10.0 - 15.0 % Final    Platelet Count 337 150 - 450 10e9/L Final         12/7/2018  6:08 PM      Component Results     Component Value Ref Range & Units Status    Sodium 140 133 - 144 mmol/L Final    Potassium 3.5 3.4 - 5.3 mmol/L Final    Chloride 107 94 - 109 mmol/L Final    Carbon Dioxide 28 20 - 32 mmol/L Final    Anion Gap 5 3 - 14 mmol/L Final    Glucose 103 (H) 70 - 99 mg/dL Final    Urea Nitrogen 16 7 - 30 mg/dL Final    Creatinine 0.70 0.52 - 1.04 mg/dL Final    GFR Estimate 86 >60 mL/min/1.7m2 Final    Non  GFR Calc    GFR Estimate If Black >90 >60 mL/min/1.7m2 Final    African American GFR Calc    Calcium 9.8 8.5 - 10.1 mg/dL Final         12/7/2018  6:13 PM      Component Results     Component Value Ref Range & Units Status    TSH 1.58 0.40 - 4.00 mU/L Final         12/7/2018  6:18 PM      Component Results     Component Value Ref Range & Units Status    HCG Qualitative Serum Negative NEG^Negative Final    This test is for screening purposes.  Results should be interpreted along with   the clinical picture.  Confirmation testing is available if warranted by   ordering [id number], HCG Quantitative Pregnancy.           12/7/2018  6:08 PM      Component Results     Component Value Ref Range & Units Status    Magnesium 2.4 (H) 1.6 - 2.3 mg/dL Final         12/7/2018  6:08 PM      Component Results     Component Value Ref Range & Units Status    Troponin I ES <0.015 0.000 - 0.045 ug/L Final    The 99th percentile for upper reference range is 0.045 ug/L.  Troponin values   in the range of 0.045 - 0.120 ug/L may be associated with risks of adverse   clinical events.           12/7/2018  6:56 PM      Narrative     CHEST TWO VIEWS   12/7/2018 6:02 PM     HISTORY: Dyspnea.    COMPARISON:  None.    FINDINGS: The lungs are clear. Normal-sized cardiac silhouette.        Impression     IMPRESSION: No evidence of active cardiopulmonary disease.    ARIEL RODRIGUEZ MD                Clinical Quality Measure: Blood Pressure Screening     Your blood pressure was checked while you were in the emergency department today. The last reading we obtained was  BP: (!) 169/104 . Please read the guidelines below about what these numbers mean and what you should do about them.  If your systolic blood pressure (the top number) is less than 120 and your diastolic blood pressure (the bottom number) is less than 80, then your blood pressure is normal. There is nothing more that you need to do about it.  If your systolic blood pressure (the top number) is 120-139 or your diastolic blood pressure (the bottom number) is 80-89, your blood pressure may be higher than it should be. You should have your blood pressure rechecked within a year by a primary care provider.  If your systolic blood pressure (the top number) is 140 or greater or your diastolic blood pressure (the bottom number) is 90 or greater, you may have high blood pressure. High blood pressure is treatable, but if left untreated over time it can put you at risk for heart attack, stroke, or kidney failure. You should have your blood pressure rechecked by a primary care provider within the next 4 weeks.  If your provider in the emergency department today gave you specific instructions to follow-up with your doctor or provider even sooner than that, you should follow that instruction and not wait for up to 4 weeks for your follow-up visit.        Thank you for choosing [facility]       Thank you for choosing [facility] for your care. Our goal is always to provide you with excellent care. Hearing back from our patients is one way we can continue to improve our services. Please take a few minutes to complete the written survey that you may receive in the mail after you visit  with us. Thank you!        [facility] Information     [facility] gives you secure access to your electronic health record. If you see a primary care provider, you can also send messages to your care team and make appointments. If you have questions, please call your primary care clinic.  If you do not have a primary care provider, please call [phone] and they will assist you.        Care EveryWhere ID     This is your Care EveryWhere ID. This could be used by other organizations to access your [facility] medical records  [id number]        Equal Access to Services     TYLER BARRETO : Jr bruner Socleve, wabull granado, chidi kaalrodrigo mcnulty, romero mcnulty. So [facility] [phone].    ATENCIÓN: Si habla español, tiene a tillman disposición servicios gratuitos de asistencia lingüística. Llame al [phone].    We comply with applicable federal civil rights laws and [place] laws. We do not discriminate on the basis of race, color, national origin, age, disability, sex, sexual orientation, or gender identity.            After Visit Summary       This is your record. Keep this with you and show to your community pharmacist(s) and doctor(s) at your next visit.

## 2018-12-07 NOTE — ED PROVIDER NOTES
History     Chief Complaint:  Tachycardia    HPI   Gaby Aguilar is a 58 year old female who presents for evaluation of palpitations for one week. She describes her palpitations as a racing heart only, no irregularities or strong beating sensation. The patient s palpitations come on randomly and nothing specifically seems to bring them on. She has felt palpitations in the past, but in the past week her intermittent episodes of palpitations have become more frequent. Her episodes occur a couple of times per hour and she has periods of no palpitations between episodes. She denies any chest pain or shortness of breath with this. The patient was at urgent care today prior to being referred here for further evaluation. The patient additionally notes several days of a tight upper back and neck, but she thinks this is unrelated to her palpitations. She walks on a treadmill and rides on the bike most mornings. No unexplained weight changes of late.     Of note, the patient denies any medical problems and does not take any daily medications. She is scheduled for a hysterectomy soon, which she is having for polyps and fibroids. She sees her doctor yearly.     Allergies:  Sulfa Drugs      Medications:    The patient is not currently taking any prescribed medications.     Past Medical History:    Diffuse cystic mastopathy     Past Surgical History:    Bunion correction surgery   Tooth extraction w/ forceps     Family History:    Presacral cancer, hypertension - mother  Hypertension, prostate cancer - father   Diabetes - grandmother   Breast cancer - maternal aunt     Social History:  The patient presented alone.  Smoking Status: former  Smokeless Tobacco: never  Alcohol Use: yes    Marital Status:   [2]     Review of Systems   Respiratory: Negative for shortness of breath.    Cardiovascular: Positive for palpitations. Negative for chest pain.   Musculoskeletal: Positive for neck stiffness.   All other systems reviewed  and are negative.      Physical Exam     Patient Vitals for the past 24 hrs:   BP Temp Temp src Heart Rate Resp SpO2 Weight   12/07/18 1930 (!) 151/97 - - 66 22 97 % -   12/07/18 1915 140/89 - - 57 20 100 % -   12/07/18 1900 (!) 153/103 - - 60 21 97 % -   12/07/18 1845 (!) 169/104 - - 61 25 (!) 85 % -   12/07/18 1830 (!) 163/99 - - 56 22 99 % -   12/07/18 1815 (!) 165/103 - - 61 14 99 % -   12/07/18 1745 (!) 160/119 - - 76 14 99 % -   12/07/18 1731 - 98.8  F (37.1  C) Oral - - - -   12/07/18 1730 (!) 167/104 - - 73 18 100 % -   12/07/18 1715 - - - 80 21 - -   12/07/18 1657 (!) 187/111 - - 85 - 100 % -   12/07/18 1655 - - Oral - 16 - 64.9 kg (143 lb)      Physical Exam    General:   Pleasant, age appropriate.  HEENT:    Oropharynx is moist, without lesions or trismus.  Eyes:    Conjunctiva normal  Neck:    Supple, no meningismus.     CV:     Regular rate and rhythm.      No murmurs, rubs or gallops.       No JVD or unilateral leg swelling.       2+ radial pulses bilateral.       No lower extremity edema.  PULM:    Clear to auscultation bilateral.       No respiratory distress.      Good air exchange.     No rales or wheezing.     No stridor.  ABD:    Soft, non-tender, non-distended.       No pulsatile masses.       No rebound, guarding or rigidity.  MSK:     No gross deformity to all four extremities.   LYMPH:   No cervical lymphadenopathy.  NEURO:   Alert. Good muscle tone, no atrophy.  Skin:    Warm, dry and intact.    Psych:    Mood is good and affect is appropriate.        Emergency Department Course     ECG:  ECG taken at 1648, ECG read at 1710  Atrial flutter with variable AV block with premature ventricular or aberrantly conducted complexes   Nonspecific ST abnormality   Abnormal ECG  Rate 121 bpm. SD interval *. QRS duration 80. QT/QTc 370/525. P-R-T axes 94,49,65.     Repeat ECG taken at 1729, ECG read at 1736  Normal sinus rhythm  Biatrial enlargement   Abnormal ECG   Rate 65 bpm. SD interval 166. QRS  duration 78. QT/QTc 380/395. P-R-T axes 53,29,35.       Imaging:  Radiology findings were communicated with the patient who voiced understanding of the findings.    Chest XR,  PA & LAT  Final Result  No evidence of active cardiopulmonary disease.  ARIEL RODRIGUEZ MD    Laboratory:  Laboratory findings were communicated with the patient who voiced understanding of the findings.    CBC: WBC 9.5, HGB 14.2,   BMP: Glucose 103 (H) o/w WNL (Creatinine 0.70)  TSH with free T4 reflex: 1.58  HCG Qualitative Blood: negative   Magnesium: 2.4 (H)   Troponin (Collected 1725): <0.015      Interventions:  1736 Lopressor 25 mg PO  1929 Xarelto 20 mg PO      Emergency Department Course:  Nursing notes and vitals reviewed.  I entered the room.  I performed an exam of the patient as documented above.     EKG obtained in the ED, see results above.      IV was inserted and blood was drawn for laboratory testing, results above.    The patient was sent for X-ray while in the emergency department, results above.     The patient received the above intervention(s).     1852 the patient was rechecked and updated regarding the results of the laboratory and imaging studies.      I discussed the treatment plan with the patient. They expressed understanding of this plan and consented to discharge. They will be discharged home with instructions for care and follow up. In addition, the patient will return to the emergency department if their symptoms worsen, if new symptoms arise or if there is any concern.  All questions were answered.     Impression & Plan      Medical Decision Making:  Gaby Aguilar is a 58 year old female who presents to the emergency department today for evaluation of palpitations.  On presentation, the patient was in atrial flutter with rapid ventricular response.  Prior to any intervention, the patient spontaneously converted to normal sinus rhythm.  EKG with normal sinus rhythm reveals no evidence of cardiac  conduction defects.  She has no signs of coronary ischemia.  She has no electrolyte disturbance or endocrinopathy to induce the atrial flutter.  She remains asymptomatic in the ED.  Patient given initial dose of metoprolol.  She will be discharged home on metoprolol and close follow-up with cardiology.  We did have a discussion regarding anticoagulation as she has a CHADS-Vasc score of 1.  After prolonged discussion regarding aspirin versus anticoagulation, patient has opted for full anticoagulation until she can have further discussion with cardiology.  At that time, cardiology will help determine whether full dose aspirin or full anti-coagulation will be necessary.  We also discussed shared decision making regarding traditional therapy with Lovenox bridging to warfarin versus novel oral anticoagulant.  She has opted opted for NOAC and specifically rivaroxaban.  Patient given her first dose in the emergency department and will be discharged home again with close follow-up cardiology, rivaroxaban and metoprolol.      Diagnosis:    ICD-10-CM    1. Episodic atrial flutter (H) I48.92 Zio Patch Holter     Follow-Up with Cardiologist     Disposition:   The patient was discharged to home.    Discharge Medications:  Discharge Medication List as of 12/7/2018  7:05 PM      START taking these medications    Details   metoprolol succinate ER (TOPROL-XL) 25 MG 24 hr tablet Take 1 tablet (25 mg) by mouth daily, Disp-30 tablet, R-0, Local Print      rivaroxaban ANTICOAGULANT (XARELTO) 20 MG TABS tablet Take 1 tablet (20 mg) by mouth daily (with dinner) for 14 days, Disp-14 tablet, R-0, Local Print           Scribe Disclosure:  IKhoa, am serving as a scribe at 5:12 PM on 12/7/2018 to document services personally performed by Kem Wallace MD, based on my observations and the provider's statements to me.   [facility] EMERGENCY DEPARTMENT       Kem Wallace MD  12/08/18 [id number]

## 2018-12-07 NOTE — ED AVS SNAPSHOT
[facility] Emergency Department    [address]    [facility] [address]    Phone:  [phone]    Fax:  [phone]                                       Gaby Aguilar   MRN: [id number]    Department:  [facility] Emergency Department   Date of Visit:  12/7/2018           After Visit Summary Signature Page     I have received my discharge instructions, and my questions have been answered. I have discussed any challenges I see with this plan with the nurse or doctor.    ..........................................................................................................................................  Patient/Patient Representative Signature      ..........................................................................................................................................  Patient Representative Print Name and Relationship to Patient    ..................................................               ................................................  Date                                   Time    ..........................................................................................................................................  Reviewed by Signature/Title    ...................................................              ..............................................  Date                                               Time          22EPIC Rev 08/18

## 2018-12-08 LAB — INTERPRETATION ECG - MUSE: NORMAL

## 2018-12-08 NOTE — DISCHARGE INSTRUCTIONS
I am going to have you follow-up with the cardiologist.  They will call you to arrange an appointment.  I will also have you follow-up for an outpatient Holter monitor where we will monitor your heart rhythm over the next 1 week.  I am starting you on blood thinners given the risk of stroke associated with the atrial flutter.    Atrial Flutter    Atrial flutter means that your heart is beating very fast. It is caused by a problem in the electrical pathways of the heart. It can be a sign of heart disease or other health problems that affect your heart.  Palpitations are the most common symptom of atrial flutter. This is the feeling that your heart is fluttering or beating fast or hard. When your heart beats too fast, it doesn t pump blood very well. This can cause other symptoms, including:    Anxiety    Fatigue    Shortness of breath    Chest pain    Dizziness    Fainting  If this is the first time you ve had atrial flutter, and you don t have heart or lung disease, it may never happen again. But in most cases, atrial flutter comes and goes. It can last from a few hours to a couple of days. Sometimes the atrial flutter doesn t ever go away. This is chronic atrial flutter.  Atrial flutter may be caused by heart disease. It may also be caused by other conditions that affect the heart:    Coronary artery disease (arteriosclerosis)    High blood pressure    Disease of the heart valves    Enlarged heart  Atrial flutter can occur without heart disease. This may be because of:    Overactive thyroid (hyperthyroid)    Chronic lung disease (COPD, emphysema, or bronchitis)    Alcohol use    Drugs or medicines that stimulate the heart. These include cocaine, amphetamines, diet pills, some decongestant cold medicines, caffeine, and nicotine.    Infection  Treating or removing these causes will make it more likely that treatment for atrial flutter will work. It will also make it less likely that atrial flutter will come  back.  Atrial flutter can happen along with another abnormal rhythm called atrial fibrillation. The risk for stroke is higher with these conditions. Getting treatment can reduce your risk.  Home care  Follow these guidelines when caring for yourself at home:    Go back to your usual activities as soon as you feel back to normal.    If you smoke, stop smoking. Talk with your healthcare provider or call a local stop-smoking program for help.    Don t take drugs or medicines that stimulate your heart. These include cocaine, amphetamines, diet pills, some decongestant cold medicines, caffeine, and nicotine.    Your provider may have prescribed medicine to stop atrial fibrillation from coming back. Take this medicine exactly as directed. Some medicines must be taken every day to work as they should.    Your provider may also have prescribed warfarin to lower your risk for stroke. Have your blood tested regularly as advised by your healthcare provider. This will help make sure the dose is right for you.  Follow-up care  Follow up with your healthcare provider, or as advised.  When should I call my healthcare provider?  Call your healthcare provider right away if any of these occur:    Shortness of breath gets worse    Swelling in either leg    Unexpected weight gain    Chest pain or pressure    Near fainting or fainting    You feel like your heart is fluttering, or beating fast or hard    Fever of 100.4 F (38 C) or higher, or as directed by your healthcare provider    Cough with dark-colored or bloody mucus  Also call your provider right away if you have signs of stroke:    Weakness or numbness of an arm or leg or one side of the face    Difficulty speaking or seeing    Extreme drowsiness, confusion, dizziness, or fainting   Date Last Reviewed: 5/1/2016 2000-2018 [employer]. [address] [place] [address], [place] [address]. All rights reserved. This information is not intended as a substitute for professional  medical care. Always follow your healthcare professional's instructions.

## 2018-12-11 LAB — INTERPRETATION ECG - MUSE: NORMAL

## 2018-12-14 ENCOUNTER — OFFICE VISIT (OUTPATIENT)
Dept: FAMILY MEDICINE | Facility: CLINIC | Age: 58
End: 2018-12-14

## 2018-12-14 VITALS
HEART RATE: 59 BPM | OXYGEN SATURATION: 99 % | HEIGHT: 64 IN | WEIGHT: 143 LBS | BODY MASS INDEX: 24.41 KG/M2 | SYSTOLIC BLOOD PRESSURE: 140 MMHG | DIASTOLIC BLOOD PRESSURE: 88 MMHG | RESPIRATION RATE: 16 BRPM | TEMPERATURE: 97.5 F

## 2018-12-14 DIAGNOSIS — Z01.818 PRE-OP EXAM: ICD-10-CM

## 2018-12-14 DIAGNOSIS — I10 ESSENTIAL HYPERTENSION, BENIGN: Primary | ICD-10-CM

## 2018-12-14 DIAGNOSIS — Z86.79 HX OF ATRIAL FLUTTER: ICD-10-CM

## 2018-12-14 PROCEDURE — 99214 OFFICE O/P EST MOD 30 MIN: CPT | Performed by: FAMILY MEDICINE

## 2018-12-14 ASSESSMENT — MIFFLIN-ST. JEOR: SCORE: 1209.67

## 2018-12-14 NOTE — PROGRESS NOTES
SUBJECTIVE:   Gaby Aguilar is a 58 year old female who presents to clinic today for the following health issues: rapid heart beat noticed in episodes last week/ went to Urgent care noticed 120 in atrial flutter/ to ER with starting metoprolol. Zio patch ordered but not scheduled. Has a surgery scheduled  and wants clearance.    Started metoprolol without any episodes noted and XARELTO for prevention. Cardiology has not scheduled her Zio patch/ needs to evaluate with Cardiology  Before surgery. The patient denies abnormal bleeding from any body orifice such as bleeding from nose or gums, blood in urine or stool, or melena, hemoptysis or hematemesis.          ED/UC Followup:    Facility:  [facility]  Date of visit: 18  Reason for visit: heart racing/pounding--atrial flutter  Current Status: feeling her heart is back to normal palpitations         Problem list and histories reviewed & adjusted, as indicated.  Additional history: as documented    Patient Active Problem List   Diagnosis     Symptomatic menopausal or female climacteric states     Allergic rhinitis     Absence of menstruation     Health Care Home     ACP (advance care planning)     Diffuse cystic mastopathy     Atrophic vaginitis     Past Surgical History:   Procedure Laterality Date     HC CORRECT BUNION,SIMPLE Right      HC TOOTH EXTRACTION W/FORCEP  age 20       Social History     Tobacco Use     Smoking status: Former Smoker     Packs/day: 0.50     Years: 6.00     Pack years: 3.00     Last attempt to quit: 3/26/1982     Years since quittin.7     Smokeless tobacco: Never Used   Substance Use Topics     Alcohol use: Yes     Alcohol/week: 4.2 oz     Types: 7 Standard drinks or equivalent per week     Family History   Problem Relation Age of Onset     Cancer Mother         presacral cancer     Hypertension Mother      Hypertension Father      Prostate Cancer Father      Diabetes Maternal Grandmother      Breast Cancer  "Maternal Aunt      PADMAJA. No family hx of      Cerebrovascular Disease No family hx of      Cancer - colorectal No family hx of          Current Outpatient Medications   Medication Sig Dispense Refill     Calcium Carbonate-Vitamin D (CALCIUM + D) 600-200 MG-UNIT per tablet Take 2 tablets by mouth 2 times daily.       metoprolol succinate ER (TOPROL-XL) 25 MG 24 hr tablet Take 1 tablet (25 mg) by mouth daily 30 tablet 0     Multiple Vitamins-Calcium (ONE-A-DAY WOMENS FORMULA) TABS Take 1 tablet by mouth daily.       rivaroxaban ANTICOAGULANT (XARELTO) 20 MG TABS tablet Take 1 tablet (20 mg) by mouth daily (with dinner) for 14 days 14 tablet 0     VITAMIN E NATURAL PO        Allergies   Allergen Reactions     Sulfa Drugs Hives     BP Readings from Last 3 Encounters:   12/14/18 (!) 160/96   12/07/18 (!) 151/97   03/26/18 126/84    Wt Readings from Last 3 Encounters:   12/14/18 64.9 kg (143 lb)   12/07/18 64.9 kg (143 lb)   03/26/18 64.9 kg (143 lb)                    Reviewed and updated as needed this visit by clinical staff  Tobacco  Allergies  Problems       Reviewed and updated as needed this visit by Provider         ROS:  Constitutional, HEENT, cardiovascular, pulmonary, gi and gu systems are negative, except as otherwise noted.    OBJECTIVE:     BP (!) 160/96 (BP Location: Right arm, Patient Position: Sitting, Cuff Size: Adult Regular)   Pulse 59   Temp 97.5  F (36.4  C) (Oral)   Ht 1.619 m (5' 3.75\")   Wt 64.9 kg (143 lb)   LMP 05/26/2010   SpO2 99%   BMI 24.74 kg/m    Body mass index is 24.74 kg/m .  GENERAL: healthy, alert and no distress  NECK: no adenopathy, no asymmetry, masses, or scars and thyroid normal to palpation  RESP: lungs clear to auscultation - no rales, rhonchi or wheezes  CV: regular rate and rhythm, normal S1 S2, no S3 or S4, no murmur, click or rub, no peripheral edema and peripheral pulses strong  ABDOMEN: soft, nontender, no hepatosplenomegaly, no masses and bowel sounds " normal  MS: no gross musculoskeletal defects noted, no edema    Diagnostic Test Results:  none     ASSESSMENT/PLAN:           (I10) Essential hypertension, benign  (primary encounter diagnosis)  Plan: CARDIOLOGY EVAL ADULT REFERRAL        controlled    (Z86.79) Hx of atrial flutter  Plan: CARDIOLOGY EVAL ADULT REFERRAL        EKG reviewed/ will help with consultation    (Z01.818) Pre-op exam  Plan: CARDIOLOGY EVAL ADULT REFERRAL        Needs cardiology ok and then a pre-op exam here/ our office will facilitate with specialists      CONSULTATION/REFERRAL to Cardiology    FUTURE APPOINTMENTS:       - Follow-up visit in next week after consultation    Reshma Enriquez MD  [facility]

## 2018-12-14 NOTE — PATIENT INSTRUCTIONS
Continue your current medications  Our referral specialists will help facilitate your monitoring and cardiology consult next week before your surgery  Schedule pre-op after those visits.

## 2018-12-17 ENCOUNTER — HOSPITAL ENCOUNTER (OUTPATIENT)
Dept: CARDIOLOGY | Facility: CLINIC | Age: 58
Discharge: HOME | End: 2018-12-17
Attending: EMERGENCY MEDICINE | Admitting: EMERGENCY MEDICINE
Payer: COMMERCIAL

## 2018-12-17 DIAGNOSIS — I48.92 EPISODIC ATRIAL FLUTTER (H): ICD-10-CM

## 2018-12-17 PROCEDURE — 0298T ZZC EXT ECG > 48HR TO 21 DAY REVIEW AND INTERPRETATN: CPT | Performed by: INTERNAL MEDICINE

## 2018-12-17 PROCEDURE — 0296T ZIO PATCH HOLTER ADULT PEDIATRIC GREATER THAN 48 HRS: CPT

## 2018-12-19 ENCOUNTER — OFFICE VISIT (OUTPATIENT)
Dept: CARDIOLOGY | Facility: CLINIC | Age: 58
End: 2018-12-19
Attending: FAMILY MEDICINE
Payer: COMMERCIAL

## 2018-12-19 VITALS
HEIGHT: 64 IN | WEIGHT: 143.8 LBS | BODY MASS INDEX: 24.55 KG/M2 | SYSTOLIC BLOOD PRESSURE: 153 MMHG | DIASTOLIC BLOOD PRESSURE: 82 MMHG | OXYGEN SATURATION: 97 % | HEART RATE: 64 BPM

## 2018-12-19 DIAGNOSIS — Z86.79 HX OF ATRIAL FLUTTER: ICD-10-CM

## 2018-12-19 DIAGNOSIS — I10 ESSENTIAL HYPERTENSION, BENIGN: ICD-10-CM

## 2018-12-19 DIAGNOSIS — Z01.818 PRE-OP EXAM: ICD-10-CM

## 2018-12-19 PROCEDURE — G0463 HOSPITAL OUTPT CLINIC VISIT: HCPCS | Mod: ZF

## 2018-12-19 PROCEDURE — 99203 OFFICE O/P NEW LOW 30 MIN: CPT | Mod: ZP | Performed by: INTERNAL MEDICINE

## 2018-12-19 RX ORDER — METOPROLOL SUCCINATE 25 MG/1
25 TABLET, EXTENDED RELEASE ORAL DAILY
Qty: 90 TABLET | Refills: 3 | Status: SHIPPED | OUTPATIENT
Start: 2018-12-19 | End: 2019-01-23

## 2018-12-19 ASSESSMENT — PAIN SCALES - GENERAL: PAINLEVEL: NO PAIN (0)

## 2018-12-19 ASSESSMENT — MIFFLIN-ST. JEOR: SCORE: 1217.27

## 2018-12-19 NOTE — PROGRESS NOTES
2018             Reshma Enriquez MD    [facility]    [address] [facility]   [place] [address]       RE:    Gaby Aguilar   MRN:  [id number]   :  1960      Dear Dr. Enriquez:      It was a pleasure participating in the care of your patient, Ms. Gaby Aguilar.  As you know, she is a 58-year-old lady who I see today for atrial flutter.      Her past medical history is significant for the followin.  Hyperlipidemia.   2.  Allergic rhinitis.   3.  Amenorrhea.      She denies having a significant history of cardiac disease.      In terms of her present symptom complex, she has had a 2 year history of racing and pounding of her heart.  It gives her a headache and makes this somewhat lightheaded, but she has never had a syncopal or near-syncopal episode.  She used to get about once a month for about 5 minutes, but more recently she has had it a couple times a day.  She had severe episode that forced to present to the emergency room on 2018 when it was discovered that she was in atrial flutter at 120 beats per minute.  She spontaneously converted to normal sinus rhythm.      She was started on rivaroxaban and metoprolol and a ZIO Patch monitor was placed.  She presents today in to establish care.  She is also anticipating having a hysterectomy next week.      In terms of her other symptoms, she denies having any further episodes of palpitations over the past week since having the monitor on.  She denies any chest pain or problems breathing.  She denies PND, orthopnea, edema, syncope or near-syncope.  She does exercise 5 times a week.  She rides her stationary bike and does walk on the treadmill for 30 minutes each, almost every day without symptoms.      In terms of her cardiac risk factors, no history of diabetes.  She does not know if she has high blood pressure, but suspects that her blood pressures may be elevated.  She smoked for 6 years, quit at age 22.  Smoked half  pack a day at that time.  One cup of coffee a day, 2 drinks of alcohol a day.      Her niece may have had an aneurysm.        She does have hyperlipidemia.  She is an .      CURRENT MEDICATIONS:     1.  Metoprolol succinate 25 mg a day.   2.  Rivaroxaban 20 mg a day.      [id number]-WFMp score is currently 1 for female gender, possibly 2 if hypertension is confirmed.      PHYSICAL EXAMINATION:     VITAL SIGNS:  Blood pressure is 150/80 with a pulse 60.  Her weight is 143 pounds.   NECK:  Exam reveals no obvious jugular venous distention.   LUNGS:  Clear to auscultation.  Respiratory effort is normal.   CARDIAC:  Rate and rhythm, no obvious murmur or gallop appreciated.   ABDOMEN:  Belly soft, nontender.   EXTREMITIES:  Without gross edema.      Her EKG 12/07/2018 confirms atrial flutter at 120 beats per minute.  On 12/07/2018, potassium 3.5, GFR, hemoglobin and TSH normal.        IMPRESSION:      Gaby is a 58-year-old lady without a prior documented history of cardiac disease who presents with a relatively new diagnosis of paroxysmal atrial flutter.  This is confirmed by EKG on 12/07/2018.  She is quite symptomatic and lightheaded with racing and pounding in her chest and this resolved spontaneously.  There is no obvious trigger for it and her [id number]-QZGz score is currently 1, possibly 2, depending on whether or not diagnosis of hypertension is confirmed.  The other is for female gender.  She has several active issues:     1.  Atrial flutter, paroxysmal.      Continue metoprolol for now.     We had a significant discussion today regarding her risk of thromboembolic event.  Currently, is approximately 1% for female gender and if hypertension is confirmed in the future, then it would go up to 2% and anticoagulation would be indicated at that time.  However, until the diagnosis of hypertension is actually confirmed, she would like to avoid full anticoagulation for now, which is reasonable, pending further  developments.     2.  Hypertension.  We will gather further information regarding her blood pressures.     3.  Preoperative evaluation prior to a hysterectomy.  The patient clearly can exercise greater than 4 metabolic equivalents without symptoms.  Pending above test results.        PLAN:     1.  Echocardiogram to rule out significant structural pathology.     2.  I would like to review her final ZIO Patch monitor report.     3.  She will keep track of her blood pressures at home for 2 weeks and for review and if they are above 130 mmHg, we will add another antihypertensive medicine to her regimen.     4.  Follow up in 3-4 months or earlier if needed.      Once again, it was a pleasure participating in the care of your patient, Ms. José Manuel Jaimes.  Please feel free to contact me anytime if you questions regarding care in the future.         Sincerely,      NANCY MORIN MD        Addendum 1/15/19:    Echo does not reveal significant structural pathology, normal EF, no significant valvular disease    Ziopatch monitor reveals rapid afib 5% overall burden, 2 pauses >= 3 seconds, no sxs mentioned    Impression:  Paroxysmal afib with RVR with signs of some sinoatrial degeneration    Plan:    F/u appointment to discuss results, review current home blood pressures and future treatment plan, anticipate possible full anticoagulation, additional rate control and approval for surgery pending clinical progress               D: 2018   T: 2018   MT: SHEREEN      Name:     JOSÉ MANUEL JAIMES   MRN:      [id number]        Account:      [id number]   :      1960      Document: [id number]

## 2018-12-19 NOTE — NURSING NOTE
Chief Complaint   Patient presents with     New Patient      Cardiac Clearance for surgery on 12/26,      Vitals were taken and medications were reconciled.     MEGAN Gibbs  9:52 AM

## 2018-12-19 NOTE — LETTER
2018      RE: Gaby Aguilar  [address]  [facility] [address]       Dear Colleague,    Thank you for the opportunity to participate in the care of your patient, Gaby Aguilar, at the [facility]. Please see a copy of my visit note below.    2018          Reshma Enriquez MD    [facility]    [facility] [address] [facility]   [place] [address]       RE:    Gaby Aguilar   MRN:  [id number]   :  1960      Dear Dr. Enriquez:      It was a pleasure participating in the care of your patient, Ms. Gaby Aguilar.  As you know, she is a 58-year-old lady who I see today for atrial flutter.      Her past medical history is significant for the followin.  Hyperlipidemia.   2.  Allergic rhinitis.   3.  Amenorrhea.      She denies having a significant history of cardiac disease.      In terms of her present symptom complex, she has had a 2 year history of racing and pounding of her heart.  It gives her a headache and makes this somewhat lightheaded, but she has never had a syncopal or near-syncopal episode.  She used to get about once a month for about 5 minutes, but more recently she has had it a couple times a day.  She had severe episode that forced to present to the emergency room on 2018 when it was discovered that she was in atrial flutter at 120 beats per minute.  She spontaneously converted to normal sinus rhythm.      She was started on rivaroxaban and metoprolol and a ZIO Patch monitor was placed.  She presents today in to establish care.  She is also anticipating having a hysterectomy next week.      In terms of her other symptoms, she denies having any further episodes of palpitations over the past week since having the monitor on.  She denies any chest pain or problems breathing.  She denies PND, orthopnea, edema, syncope or near-syncope.  She does exercise 5 times a week.  She rides her stationary  bike and does walk on the treadmill for 30 minutes each, almost every day without symptoms.      In terms of her cardiac risk factors, no history of diabetes.  She does not know if she has high blood pressure, but suspects that her blood pressures may be elevated.  She smoked for 6 years, quit at age 22.  Smoked half pack a day at that time.  One cup of coffee a day, 2 drinks of alcohol a day.      Her niece may have had an aneurysm.        She does have hyperlipidemia.  She is an .      CURRENT MEDICATIONS:     1.  Metoprolol succinate 25 mg a day.   2.  Rivaroxaban 20 mg a day.      [id number] score is currently 1 for female gender, possibly 2 if hypertension is confirmed.      PHYSICAL EXAMINATION:     VITAL SIGNS:  Blood pressure is 150/80 with a pulse 60.  Her weight is 143 pounds.   NECK:  Exam reveals no obvious jugular venous distention.   LUNGS:  Clear to auscultation.  Respiratory effort is normal.   CARDIAC:  Rate and rhythm, no obvious murmur or gallop appreciated.   ABDOMEN:  Belly soft, nontender.   EXTREMITIES:  Without gross edema.      Her EKG 12/07/2018 confirms atrial flutter at 120 beats per minute.  On 12/07/2018, potassium 3.5, GFR, hemoglobin and TSH normal.        IMPRESSION:      Gaby is a 58-year-old lady without a prior documented history of cardiac disease who presents with a relatively new diagnosis of paroxysmal atrial flutter.  This is confirmed by EKG on 12/07/2018.  She is quite symptomatic and lightheaded with racing and pounding in her chest and this resolved spontaneously.  There is no obvious trigger for it and her GBR8KI7-GWMd score is currently 1, possibly 2, depending on whether or not diagnosis of hypertension is confirmed.  The other is for female gender.  She has several active issues:     1.  Atrial flutter, paroxysmal.      Continue metoprolol for now.     We had a significant discussion today regarding her risk of thromboembolic event.  Currently, is  approximately 1% for female gender and if hypertension is confirmed in the future, then it would go up to 2% and anticoagulation would be indicated at that time.  However, until the diagnosis of hypertension is actually confirmed, she would like to avoid full anticoagulation for now, which is reasonable, pending further developments.     2.  Hypertension.  We will gather further information regarding her blood pressures.     3.  Preoperative evaluation prior to a hysterectomy.  The patient clearly can exercise greater than 4 metabolic equivalents without symptoms.  Pending above test results.        PLAN:     1.  Echocardiogram to rule out significant structural pathology.     2.  I would like to review her final ZIO Patch monitor report.     3.  She will keep track of her blood pressures at home for 2 weeks and for review and if they are above 130 mmHg, we will add another antihypertensive medicine to her regimen.     4.  Follow up in 3-4 months or earlier if needed.      NANCY MORIN MD       D: 2018   T: 2018   MT: SHEREEN   Name:     JOSÉ MANUEL JAIMES   MRN:      [id number]        Account:      [id number]   :      1960   Document: [id number]

## 2018-12-19 NOTE — NURSING NOTE
Cardiac Testing: Patient given instructions regarding  echocardiogram . Discussed purpose, preparation, procedure and when to expect results reported back to the patient. Patient demonstrated understanding of this information and agreed to call with further questions or concerns.  Med Reconcile: Reviewed and verified all current medications with the patient. The updated medication list was printed and given to the patient.  Return Appointment: Patient given instructions regarding scheduling next clinic visit. Patient demonstrated understanding of this information and agreed to call with further questions or concerns.  Patient stated she understood all health information given and agreed to call with further questions or concerns.    Ivory Young LPN

## 2018-12-19 NOTE — PATIENT INSTRUCTIONS
Patient Instructions:  It was a pleasure to see you in the cardiology clinic today.      If you have any questions, you can reach my nurse, Ivory Young LPN, at [phone].  Press Option #1 for the [facility], and then press Option #3 for nursing.    We are encouraging the use of [employer] IOt to communicate with your HealthCare Provider    Medication Changes: None.    Studies Ordered: Echocardiogram at your convenience.    The results from today include: None.    Recommendations:    - Dr Doyle will review the results of your Ziopatch once they are back.  - Please monitor home blood pressures.  Contact clinic with about 2 weeks worth of readings.  Please follow up: With Dr. Doyle in 3-4 months.    Sincerely,    David Doyle MD     If you have an urgent need after hours (8:00 am to 4:30 pm) please call [phone] and ask for the cardiology fellow on call.

## 2018-12-28 ENCOUNTER — TELEPHONE (OUTPATIENT)
Dept: CARDIOLOGY | Facility: CLINIC | Age: 58
End: 2018-12-28

## 2018-12-28 NOTE — TELEPHONE ENCOUNTER
[facility] Call Center    Phone Message    May a detailed message be left on voicemail: yes    Reason for Call: Medication Refill Request    Has the patient contacted the pharmacy for the refill? Yes   Name of medication being requested: Per call from PT yesterday she went to  her metoprolol succinate ER (TOPROL-XL) 25 MG 24 hr tablet at [facility] [address] [place] [address] [address] but told by the Pharmacy that they don't have her RX.  PT is requesting RX to be re-sent and is requesting for a call back once that's done.  Provider who prescribed the medication: Dr Doyle  Pharmacy: [place] [id number] [place] [address]   Date medication is needed: ASAP          Action Taken: Message routed to:  [facility]): Cardiology

## 2018-12-28 NOTE — TELEPHONE ENCOUNTER
Called and spoke with Pharmacy, who stated they do have a prescription; however, they noted it was filled 12/07 so it could not be refilled until 01/01.      Called and spoke with Pt and informed her of the above information.  Pt verbalized understanding, agreed to current plan and denied any further questions.    Ivory Young LPN

## 2019-01-04 ENCOUNTER — TELEPHONE (OUTPATIENT)
Dept: CARDIOLOGY | Facility: CLINIC | Age: 59
End: 2019-01-04

## 2019-01-09 ENCOUNTER — HOSPITAL ENCOUNTER (OUTPATIENT)
Dept: CARDIOLOGY | Facility: CLINIC | Age: 59
Discharge: HOME | End: 2019-01-09
Attending: INTERNAL MEDICINE | Admitting: INTERNAL MEDICINE
Payer: COMMERCIAL

## 2019-01-09 DIAGNOSIS — Z86.79 HX OF ATRIAL FLUTTER: ICD-10-CM

## 2019-01-09 PROCEDURE — 93306 TTE W/DOPPLER COMPLETE: CPT | Mod: 26 | Performed by: INTERNAL MEDICINE

## 2019-01-09 PROCEDURE — 93306 TTE W/DOPPLER COMPLETE: CPT

## 2019-01-14 ENCOUNTER — TELEPHONE (OUTPATIENT)
Dept: CARDIOLOGY | Facility: CLINIC | Age: 59
End: 2019-01-14

## 2019-01-14 ENCOUNTER — TELEPHONE (OUTPATIENT)
Dept: FAMILY MEDICINE | Facility: CLINIC | Age: 59
End: 2019-01-14

## 2019-01-14 NOTE — TELEPHONE ENCOUNTER
Gaby is calling for zio-patch results and wondering if they are available.  Also was a copy sent to her cardiologist? - please advise    Patient's phone #[phone]

## 2019-01-14 NOTE — TELEPHONE ENCOUNTER
Health Call Center    Phone Message    May a detailed message be left on voicemail: yes    Reason for Call: Other: Per call from PT is reported to [place] works with Dr Doyle that her Zio-Patch Monitor results should should be in. If Dr Doyle has access to the results, great, but if not, please call the patient to discuss plans     Action Taken: Message routed to:  [facility] Cardiology

## 2019-01-14 NOTE — TELEPHONE ENCOUNTER
Spoke to Gaby to let her know that the results need to come from cardiology at the ED.  She is a bit frustrated as we have the report but cannot give her the information and the report needs to go to her cardiologist, however, Dr Enriquez is saying it needs to come through cardiology at the ED so bottom line, this patient is getting the run around and her cardiologist still does not have the results.  I apologized and said that I was just relaying Dr Enriquez's message.

## 2019-01-14 NOTE — TELEPHONE ENCOUNTER
Encourage her to call the cardiology office who read the results ordered by urgent care. She may want to make an appointment.

## 2019-01-15 NOTE — TELEPHONE ENCOUNTER
Called and spoke with Pt.  Informed her that we were able to locate her Ziopatch results and that Dr Doyle was notified and we will contact her with any recommendations.    Ivory Young LPN

## 2019-01-16 NOTE — TELEPHONE ENCOUNTER
Called and spoke with Pt.  Informed her of results and Dr Doyle's recommendations.  Pt agreed to appointment 01/23 @ 1:30 PM.  Pt verbalized understanding, agreed to current plan and denied any further questions.    Ivory Young LPN

## 2019-01-23 ENCOUNTER — OFFICE VISIT (OUTPATIENT)
Dept: CARDIOLOGY | Facility: CLINIC | Age: 59
End: 2019-01-23
Attending: INTERNAL MEDICINE
Payer: COMMERCIAL

## 2019-01-23 VITALS
OXYGEN SATURATION: 98 % | HEART RATE: 54 BPM | WEIGHT: 142.5 LBS | HEIGHT: 64 IN | SYSTOLIC BLOOD PRESSURE: 136 MMHG | BODY MASS INDEX: 24.33 KG/M2 | DIASTOLIC BLOOD PRESSURE: 79 MMHG

## 2019-01-23 DIAGNOSIS — Z13.220 SCREENING FOR LIPID DISORDERS: ICD-10-CM

## 2019-01-23 DIAGNOSIS — Z86.79 HX OF ATRIAL FLUTTER: ICD-10-CM

## 2019-01-23 DIAGNOSIS — I48.92 ATRIAL FLUTTER, UNSPECIFIED TYPE (H): Primary | ICD-10-CM

## 2019-01-23 PROCEDURE — G0463 HOSPITAL OUTPT CLINIC VISIT: HCPCS | Mod: ZF

## 2019-01-23 PROCEDURE — 99214 OFFICE O/P EST MOD 30 MIN: CPT | Mod: ZP | Performed by: INTERNAL MEDICINE

## 2019-01-23 RX ORDER — METOPROLOL SUCCINATE 25 MG/1
TABLET, EXTENDED RELEASE ORAL
Qty: 135 TABLET | Refills: 1 | Status: SHIPPED | OUTPATIENT
Start: 2019-01-23 | End: 2019-01-29

## 2019-01-23 ASSESSMENT — MIFFLIN-ST. JEOR: SCORE: 1211.38

## 2019-01-23 ASSESSMENT — PAIN SCALES - GENERAL: PAINLEVEL: NO PAIN (0)

## 2019-01-23 NOTE — PATIENT INSTRUCTIONS
Patient Instructions:  It was a pleasure to see you in the cardiology clinic today.      If you have any questions, you can reach my nurse, Ivory Young LPN, at [phone].  Press Option #1 for the [facility], and then press Option #3 for nursing.    We are encouraging the use of 3nder to communicate with your HealthCare Provider    Medication Changes:   - Increase Metoprolol to 25 mg every morning and 12.5 mg (0.5 tab) every evening.  - Start ASA 81 every day.    Studies Ordered: None.    Recommendations:  You are approved to proceed with your surgery at an acceptable cardiovascular risk.     The results from today include: Ziopatch    Please follow up: With Dr. Doyle in 4-6 months with fasting labs prior.    Sincerely,    David Doyle MD     If you have an urgent need after hours (8:00 am to 4:30 pm) please call [phone] and ask for the cardiology fellow on call.

## 2019-01-23 NOTE — LETTER
2019      RE: Gaby Aguilar  [address] [facility] [address]       Dear Colleague,    Thank you for the opportunity to participate in the care of your patient, Gaby Aguilar, at the [facility]. Please see a copy of my visit note below.    [id number]      Service Date: 2019      Reshma Enriquez MD   [facility]     [address] [place] [address]      Laisha Kathleen MD   [address]   [address] [place] [address]      RE: Gaby Aguilar    MRN: [id number]   : 1960      Dear Dr. Enriquez and Dr. Kathleen:       It was a pleasure participating in the care of your patient Ms. Gaby Aguilar.  As you know, she is a 58-year-old lady whom I see today for atrial fibrillation.      PAST MEDICAL HISTORY:  Significant for the followin.  Hyperlipidemia.   2.  Allergic rhinitis.   3.  Amenorrhea.        Her cardiac history is significant for documented paroxysmal atrial fib/atrial flutter.  This was confirmed on EKG 2018 and also on Ziopatch monitor more recently, in which her overall AFib burden was approximately 5%.      I last saw her 2018.  At that time we had ordered an echocardiogram and a Ziopatch monitor.  Her echo on 2019 revealed a structurally normal heart.  Her Ziopatch monitor revealed a 5% burden of AFib with a couple of pauses greater than 3 seconds that were asymptomatic.  We had also instructed her to keep track of her home blood pressures in order to either prove or disprove a diagnosis of hypertension, which would certainly impact her overall risk for a thromboembolic event in terms of her CHADS-VASc2 score.  She returns today for followup.      Since her last visit, she has been keeping track of her blood pressures at home, and they have all been normal.  They have been all in the 120 mmHg range or less.  Her CHADS-VASc2 score is therefore only 1 for  female gender.  She has been doing well on her low-dose of metoprolol, but she has still been feeling an occasional palpitation, which is manifested by a rapid irregular beating heart.  She has not had any dizzy episodes.  No lightheadedness, syncope or near-syncope.  She has not had any neurologic symptoms, such as numbness, weakness or tingling, and she has not had any bleeding problems.      She otherwise denies other chest pain, PND, orthopnea, edema or syncope.      PRESENT MEDICATIONS:  Metoprolol succinate 25 mg a day.      PHYSICAL EXAMINATION:   VITAL SIGNS:  Her blood pressure here is 136/79 with a pulse of 54.  Her weight is 142 pounds.   NECK:  Normal jugular venous pressure.  No hepatojugular reflux.   LUNGS:  Clear to auscultation.  Respiratory effort is normal.   CARDIAC:  Regular rate and rhythm with occasional extrasystoles.  No gross murmur or gallop appreciated.   ABDOMEN:  Belly is soft and nontender.   EXTREMITIES:  Without gross edema.      IMAGING:  Echocardiogram 01/09/2019 reveals an ejection fraction of 60%-65% without gross valvular pathology.      Ziopatch monitor reveals overall AFib burden of 5%.  Two pauses greater than 3 seconds.  No symptoms mentioned.      IMPRESSION:  Gaby is a 58-year-old lady with several active cardiac issues:   1.  Paroxysmal AFib/AFlutter.  Her CHADS-VASc2 score is 1 for female gender only.  She did not have a diagnosis of hypertension, as her home blood pressures have all been in the normotensive range.  Her current risk for a thromboembolic event approaches 1% per year, and full anticoagulation would not be definitively indicated at the present time.      Our attention thus turns to possible rhythm/rate control and that she is tolerating a low dose of her beta blocker adequately; however, she still has occasional breakthrough episodes.  Further medical therapy would be recommended.   2.  Preoperative evaluation prior to a hysterectomy.  She clearly can  exercise greater than 4 metabolic equivalents without symptoms, and she is able to go for 30 minutes on a stationary bike and walk on the treadmill almost every day without symptoms.      PLAN:   1.  Start baby aspirin, enteric coated, 81 mg a day to help decrease thromboembolic risk.   2.  Increase metoprolol succinate from 25 mg a day to 25 mg every morning and 12.5 mg at night.  In this way, we can hopefully provide additional rhythm control, but also decrease her rate should she go back into AFib/AFlutter.   3.  She is approved for her procedure at somewhat increased but acceptable perioperative risk for event.      Note that she is at increased risk in the perioperative state for having recurrent AFib/AFlutter, and resources to help control rhythm and rate should be readily available should she require them.   4.  Otherwise, she can follow up with me in approximately 4-6 months with a fasting lipid profile, earlier if needed.              Once again, it was a pleasure participating in the care of your patient Ms. Gaby Aguilar.  Please feel free to contact me at any time if you have any questions regarding her care in the future.       Sincerely,      David Doyle MD

## 2019-01-23 NOTE — PROGRESS NOTES
Service Date: 2019      Reshma Enriquez MD   [facility]     [address] [place] [address]      Laisha Kathleen MD   [address]   [address] [place] [address]      RE: Gaby Aguilar    MRN: [id number]   : 1960      Dear Dr. Enriquez and Dr. Kathleen:       It was a pleasure participating in the care of your patient Ms. Gaby Aguilar.  As you know, she is a 58-year-old lady whom I see today for atrial fibrillation.      PAST MEDICAL HISTORY:      1.  Hyperlipidemia.   2.  Allergic rhinitis.   3.  Amenorrhea.        Her cardiac history is significant for documented paroxysmal atrial fib/atrial flutter.  This was confirmed on EKG 2018 and also on Ziopatch monitor more recently, in which her overall AFib burden was approximately 5%.      I last saw her 2018.  At that time we had ordered an echocardiogram and a Ziopatch monitor.  Her echo on 2019 revealed a structurally normal heart.  Her Ziopatch monitor revealed a 5% burden of AFib with a couple of pauses greater than 3 seconds that were asymptomatic.  We had also instructed her to keep track of her home blood pressures in order to either prove or disprove a diagnosis of hypertension, which would certainly impact her overall risk for a thromboembolic event in terms of her CHADS-VASc2 score.  She returns today for followup.      Since her last visit, she has been keeping track of her blood pressures at home, and they have all been normal.  They have been all in the 120 mmHg range or less.  Her CHADS-VASc2 score is therefore only 1 for female gender.  She has been doing well on her low-dose of metoprolol, but she has still been feeling an occasional palpitation, which is manifested by a rapid irregular beating heart.  She has not had any dizzy episodes.  No lightheadedness, syncope or near-syncope.  She has not had any neurologic symptoms, such as numbness, weakness or tingling, and she  has not had any bleeding problems.      She otherwise denies other chest pain, PND, orthopnea, edema or syncope.      PRESENT MEDICATIONS:  Metoprolol succinate 25 mg a day.      PHYSICAL EXAMINATION:     VITAL SIGNS:  Her blood pressure here is 136/79 with a pulse of 54.  Her weight is 142 pounds.   NECK:  Normal jugular venous pressure.  No hepatojugular reflux.   LUNGS:  Clear to auscultation.  Respiratory effort is normal.   CARDIAC:  Regular rate and rhythm with occasional extrasystoles.  No gross murmur or gallop appreciated.   ABDOMEN:  Belly is soft and nontender.   EXTREMITIES:  Without gross edema.      IMAGING:  Echocardiogram 01/09/2019 reveals an ejection fraction of 60%-65% without gross valvular pathology.      Ziopatch monitor reveals overall AFib burden of 5%.  Two pauses greater than 3 seconds.  No symptoms mentioned.        IMPRESSION:      Gaby is a 58-year-old lady with several active cardiac issues:     1.  Paroxysmal AFib/AFlutter.      Her CHADS-VASc2 score is 1 for female gender only.  She did not have a diagnosis of hypertension, as her home blood pressures have all been in the normotensive range.  Her current risk for a thromboembolic event is approximately 1% per year, and full anticoagulation would not be definitively indicated at the present time.      Our attention thus turns to possible rhythm/rate control and that she is tolerating a low dose of her beta blocker adequately; however, she still has occasional breakthrough episodes.  Further medical therapy would be recommended.     2.  Preoperative evaluation prior to a hysterectomy.      She clearly can exercise greater than 4 metabolic equivalents without symptoms, and she is able to go for 30 minutes on a stationary bike and walk on the treadmill almost every day without symptoms.        PLAN:     1.  Start baby aspirin, enteric coated, 81 mg a day to help decrease thromboembolic risk.     2.  Increase metoprolol succinate from 25 mg  a day to 25 mg every morning and 12.5 mg at night.  In this way, we can hopefully provide additional rhythm control, but also decrease her rate should she go back into AFib/AFlutter.     3.  She is approved for her procedure at somewhat increased but acceptable perioperative risk for event.      Note that she is at increased risk in the perioperative state for having recurrent AFib/AFlutter, and resources to help control rhythm and rate should be readily available should she require them.     4.  Otherwise, she can follow up with me in approximately 4-6 months with a fasting lipid profile, earlier if needed.              Once again, it was a pleasure participating in the care of your patient Ms. José Manuel Jaimes.  Please feel free to contact me at any time if you have any questions regarding her care in the future.       Sincerely,      MD NANCY Ugalde MD             D: 2019   T: 2019   MT: judy      Name:     JOSÉ MANUEL JAIMES   MRN:      [id number]        Account:      [id number]   :      1960           Service Date: 2019      Document: [id number]

## 2019-01-29 ENCOUNTER — TELEPHONE (OUTPATIENT)
Dept: CARDIOLOGY | Facility: CLINIC | Age: 59
End: 2019-01-29

## 2019-01-29 DIAGNOSIS — Z86.79 HX OF ATRIAL FLUTTER: ICD-10-CM

## 2019-01-29 RX ORDER — METOPROLOL SUCCINATE 25 MG/1
TABLET, EXTENDED RELEASE ORAL
Qty: 135 TABLET | Refills: 1 | Status: SHIPPED | OUTPATIENT
Start: 2019-01-29 | End: 2019-07-09

## 2019-01-29 NOTE — TELEPHONE ENCOUNTER
Called and spoke with Pt and informed her that the Metoprolol ER prescription was sent to [facility] as requested.  Pt denied further questions.    Ivory Young LPN

## 2019-01-29 NOTE — TELEPHONE ENCOUNTER
[facility] Call Center    Phone Message    May a detailed message be left on voicemail: yes    Reason for Call: Medication Refill Request    Has the patient contacted the pharmacy for the refill? Yes   Name of medication being requested: metoprolol succinate ER (TOPROL-XL)  Provider who prescribed the medication: David Doyle  Pharmacy: [facility]. Pt did not have any information as to which one (no telephone number or location)  Date medication is needed: As soon as possible         Action Taken: Message routed to:  [facility]

## 2019-03-01 ENCOUNTER — OFFICE VISIT (OUTPATIENT)
Dept: FAMILY MEDICINE | Facility: CLINIC | Age: 59
End: 2019-03-01

## 2019-03-01 VITALS
TEMPERATURE: 98.2 F | DIASTOLIC BLOOD PRESSURE: 68 MMHG | BODY MASS INDEX: 24.34 KG/M2 | WEIGHT: 142.6 LBS | HEART RATE: 54 BPM | SYSTOLIC BLOOD PRESSURE: 126 MMHG | RESPIRATION RATE: 16 BRPM | OXYGEN SATURATION: 98 % | HEIGHT: 64 IN

## 2019-03-01 DIAGNOSIS — Z01.818 PREOPERATIVE EXAMINATION: ICD-10-CM

## 2019-03-01 DIAGNOSIS — N85.01 ENDOMETRIAL HYPERPLASIA WITHOUT ATYPIA, SIMPLE: ICD-10-CM

## 2019-03-01 DIAGNOSIS — N84.0 ENDOMETRIAL POLYP: Primary | ICD-10-CM

## 2019-03-01 PROCEDURE — 99214 OFFICE O/P EST MOD 30 MIN: CPT | Performed by: FAMILY MEDICINE

## 2019-03-01 ASSESSMENT — MIFFLIN-ST. JEOR: SCORE: 1203.89

## 2019-03-01 NOTE — PROGRESS NOTES
[facility]  [address]  [address]  [facility] [address]  [phone]  Dept: [phone]    PRE-OP EVALUATION:  Today's date: 3/1/2019    Gaby Aguilar (: 1960) presents for pre-operative evaluation assessment as requested by Dr. Laisha Kathleen.  She requires evaluation and anesthesia risk assessment prior to undergoing surgery/procedure for treatment of hysterectomy .    Proposed Surgery/ Procedure: hysterectomy  Date of Surgery/ Procedure: 19  Time of Surgery/ Procedure: Am  Hospital/Surgical Facility: [facility]  Fax number for surgical facility:   Primary Physician: Reshma Enriquez  Type of Anesthesia Anticipated: to be determined    Patient has a Health Care Directive or Living Will:  YES     1. YES - DO YOU HAVE A HISTORY OF HEART ATTACK, STROKE, STENT, BYPASS OR SURGERY ON AN ARTERY IN THE HEAD, NECK, HEART OR LEG? atrial flutter controlled with beta blocker  2. NO - Do you ever have any pain or discomfort in your chest?  3. NO - Do you have a history of  Heart Failure?  4. NO - Are you troubled by shortness of breath when: walking on the level, up a slight hill or at night?  5. NO - Do you currently have a cold, bronchitis or other respiratory infection?  6. NO - Do you have a cough, shortness of breath or wheezing?  7. NO - Do you sometimes get pains in the calves of your legs when you walk?  8. NO - Do you or anyone in your family have previous history of blood clots?  9. NO - Do you or does anyone in your family have a serious bleeding problem such as prolonged bleeding following surgeries or cuts?  10. NO - Have you ever had problems with anemia or been told to take iron pills?  11. NO - Have you had any abnormal blood loss such as black, tarry or bloody stools, or abnormal vaginal bleeding?  12. NO - Have you ever had a blood transfusion?  13. NO - Have you or any of your relatives ever had problems with anesthesia?  14. NO - Do you have sleep apnea,  excessive snoring or daytime drowsiness?  15. NO - Do you have any prosthetic heart valves?  16. NO - Do you have prosthetic joints?  17. NO - Is there any chance that you may be pregnant?      HPI:     HPI related to upcoming procedure: Elective hysterectomy and BSO      See problem list for active medical problems.  Problems all longstanding and stable, except as noted/documented.  See ROS for pertinent symptoms related to these conditions.                                                                                                                                                          .    MEDICAL HISTORY:     Patient Active Problem List    Diagnosis Date Noted     Hx of atrial flutter 12/14/2018     Priority: Medium     Atrophic vaginitis 02/19/2014     Priority: Medium     Diffuse cystic mastopathy 12/27/2012     Priority: Medium     Health Care Home 09/13/2012     Priority: Medium     State Tier Level:  Tier 0  Status:  n/a  Care Coordinator:  n/a     See Letters for [facility] Care Plan           ACP (advance care planning) 09/13/2012     Priority: Medium     Advance Care Planning 3/26/2018: ACP Review of Chart / Resources Provided:  Reviewed chart for advance care plan.  Gaby REGAN Lauren has an up to date advance care plan on file.  Added by Mary Beth Durand       Allergic rhinitis 05/27/2011     Priority: Medium     Problem list name updated by automated process. Provider to review       Absence of menstruation 05/27/2011     Priority: Medium     Symptomatic menopausal or female climacteric states 01/09/2008     Priority: Medium      Past Medical History:   Diagnosis Date     Allergic rhinitis 5/27/2011     Problem list name updated by automated process. Provider to review     Arthritis     hands     Diffuse cystic mastopathy 12/27/2012     Hx of atrial flutter 12/14/2018     Symptomatic menopausal or female climacteric states 1/9/2008     Past Surgical History:   Procedure Laterality Date     HC CORRECT  "MELANIE,SIMPLE Right      HC TOOTH EXTRACTION W/FORCEP  age 20     Current Outpatient Medications   Medication Sig Dispense Refill     aspirin (ASA) 81 MG tablet Take 1 tablet (81 mg) by mouth daily 90 tablet 0     Calcium Carbonate-Vitamin D (CALCIUM + D) 600-200 MG-UNIT per tablet Take 2 tablets by mouth 2 times daily.       metoprolol succinate ER (TOPROL-XL) 25 MG 24 hr tablet Take one tab (25 mg) every morning.  Take 0.5 tab (12.5 mg) every evening. 135 tablet 1     Multiple Vitamins-Calcium (ONE-A-DAY WOMENS FORMULA) TABS Take 1 tablet by mouth daily.       VITAMIN E NATURAL PO        OTC products: None, except as noted above    Allergies   Allergen Reactions     Sulfa Drugs Hives      Latex Allergy: NO    Social History     Tobacco Use     Smoking status: Former Smoker     Packs/day: 0.50     Years: 6.00     Pack years: 3.00     Last attempt to quit: 3/26/1982     Years since quittin.9     Smokeless tobacco: Never Used   Substance Use Topics     Alcohol use: Yes     Alcohol/week: 4.2 oz     Types: 7 Standard drinks or equivalent per week     Comment: 4-5 drinks week     History   Drug Use No       REVIEW OF SYSTEMS:   CONSTITUTIONAL: NEGATIVE for fever, chills, change in weight  ENT/MOUTH: NEGATIVE for ear, mouth and throat problems  RESP: NEGATIVE for significant cough or SOB  CV: NEGATIVE for chest pain, palpitations or peripheral edema    EXAM:   /68 (BP Location: Left arm, Patient Position: Sitting, Cuff Size: Adult Regular)   Pulse 54   Temp 98.2  F (36.8  C) (Oral)   Resp 16   Ht 1.613 m (5' 3.5\")   Wt 64.7 kg (142 lb 9.6 oz)   LMP 2010   SpO2 98%   BMI 24.86 kg/m    GENERAL APPEARANCE: healthy, alert and no distress  HENT: ear canals and TM's normal and nose and mouth without ulcers or lesions  RESP: lungs clear to auscultation - no rales, rhonchi or wheezes  CV: regular rate and rhythm, normal S1 S2, no S3 or S4 and no murmur, click or rub   ABDOMEN: soft, nontender, no HSM " or masses and bowel sounds normal  NEURO: Normal strength and tone, sensory exam grossly normal, mentation intact and speech normal    DIAGNOSTICS:   Orders noted at hospital    Recent Labs   Lab Test 12/07/18  1725 03/26/18  0929 03/16/18  0929 04/13/17  0947  11/09/16   HGB 14.2 14.3 14.0  --    < > 14.0     --  317  --   --  281     --   --   --   --  141   POTASSIUM 3.5  --   --   --   --  4.6   CR 0.70  --   --   --   --  0.67   A1C  --   --   --  5.2  --   --     < > = values in this interval not displayed.        IMPRESSION:   Diagnosis/reason for consult: (N84.0) Endometrial polyp  (primary encounter diagnosis    (N85.01) Endometrial hyperplasia without atypia, simple  Plan:     (Z01.818) Preoperative examinatio        The proposed surgical procedure is considered LOW risk.    REVISED CARDIAC RISK INDEX  The patient has the following serious cardiovascular risks for perioperative complications such as (MI, PE, VFib and 3  AV Block):  Monitor for arrhythmia/  (>1 % cardiac complication risk)  INTERPRETATION: 1 risks: Class II (low risk - 0.9% complication rate)    The patient has the following additional risks for perioperative complications:  No identified additional risks  Has stopped aspirin for surgery        RECOMMENDATIONS:     --Consult hospital rounder / IM to assist post-op medical management    --Patient is to take all scheduled medications on the day of surgery EXCEPT for modifications listed below.    APPROVAL GIVEN to proceed with proposed procedure, without further diagnostic evaluation       Signed Electronically by: Reshma Enriquez MD    Copy of this evaluation report is provided to requesting physician.

## 2019-03-05 ENCOUNTER — HOSPITAL ENCOUNTER (OUTPATIENT)
Dept: LAB | Facility: CLINIC | Age: 59
Discharge: HOME | End: 2019-03-05
Attending: OBSTETRICS & GYNECOLOGY | Admitting: OBSTETRICS & GYNECOLOGY
Payer: COMMERCIAL

## 2019-03-05 DIAGNOSIS — Z01.812 PRE-OPERATIVE LABORATORY EXAMINATION: ICD-10-CM

## 2019-03-05 LAB
ABO + RH BLD: NORMAL
ABO + RH BLD: NORMAL
BLD GP AB SCN SERPL QL: NORMAL
BLOOD BANK CMNT PATIENT-IMP: NORMAL
SPECIMEN EXP DATE BLD: NORMAL

## 2019-03-05 PROCEDURE — 86901 BLOOD TYPING SEROLOGIC RH(D): CPT | Performed by: OBSTETRICS & GYNECOLOGY

## 2019-03-05 PROCEDURE — 36415 COLL VENOUS BLD VENIPUNCTURE: CPT | Performed by: OBSTETRICS & GYNECOLOGY

## 2019-03-05 PROCEDURE — 86900 BLOOD TYPING SEROLOGIC ABO: CPT | Performed by: OBSTETRICS & GYNECOLOGY

## 2019-03-05 PROCEDURE — 86850 RBC ANTIBODY SCREEN: CPT | Performed by: OBSTETRICS & GYNECOLOGY

## 2019-03-06 ENCOUNTER — HOSPITAL ENCOUNTER (OUTPATIENT)
Facility: CLINIC | Age: 59
Discharge: HOME | End: 2019-03-06
Attending: OBSTETRICS & GYNECOLOGY | Admitting: OBSTETRICS & GYNECOLOGY
Payer: COMMERCIAL

## 2019-03-06 ENCOUNTER — ANESTHESIA EVENT (OUTPATIENT)
Dept: SURGERY | Facility: CLINIC | Age: 59
End: 2019-03-06
Payer: COMMERCIAL

## 2019-03-06 ENCOUNTER — ANESTHESIA (OUTPATIENT)
Dept: SURGERY | Facility: CLINIC | Age: 59
End: 2019-03-06
Payer: COMMERCIAL

## 2019-03-06 VITALS
HEART RATE: 57 BPM | DIASTOLIC BLOOD PRESSURE: 93 MMHG | BODY MASS INDEX: 24.24 KG/M2 | OXYGEN SATURATION: 100 % | TEMPERATURE: 97 F | RESPIRATION RATE: 16 BRPM | HEIGHT: 64 IN | SYSTOLIC BLOOD PRESSURE: 177 MMHG | WEIGHT: 142 LBS

## 2019-03-06 DIAGNOSIS — Z90.710 S/P LAPAROSCOPIC HYSTERECTOMY: Primary | ICD-10-CM

## 2019-03-06 LAB
CREAT SERPL-MCNC: 0.73 MG/DL (ref 0.52–1.04)
GFR SERPL CREATININE-BSD FRML MDRD: >90 ML/MIN/{1.73_M2}
HGB BLD-MCNC: 14.1 G/DL (ref 11.7–15.7)
POTASSIUM SERPL-SCNC: 4.2 MMOL/L (ref 3.4–5.3)

## 2019-03-06 PROCEDURE — 37000008 ZZH ANESTHESIA TECHNICAL FEE, 1ST 30 MIN: Performed by: OBSTETRICS & GYNECOLOGY

## 2019-03-06 PROCEDURE — 85018 HEMOGLOBIN: CPT | Performed by: ANESTHESIOLOGY

## 2019-03-06 PROCEDURE — 25800030 ZZH RX IP 258 OP 636: Performed by: NURSE ANESTHETIST, CERTIFIED REGISTERED

## 2019-03-06 PROCEDURE — 36415 COLL VENOUS BLD VENIPUNCTURE: CPT | Performed by: ANESTHESIOLOGY

## 2019-03-06 PROCEDURE — 25000132 ZZH RX MED GY IP 250 OP 250 PS 637: Performed by: OBSTETRICS & GYNECOLOGY

## 2019-03-06 PROCEDURE — 25000125 ZZHC RX 250: Performed by: NURSE ANESTHETIST, CERTIFIED REGISTERED

## 2019-03-06 PROCEDURE — 71000012 ZZH RECOVERY PHASE 1 LEVEL 1 FIRST HR: Performed by: OBSTETRICS & GYNECOLOGY

## 2019-03-06 PROCEDURE — 25000128 H RX IP 250 OP 636: Performed by: OBSTETRICS & GYNECOLOGY

## 2019-03-06 PROCEDURE — 25000128 H RX IP 250 OP 636: Performed by: NURSE ANESTHETIST, CERTIFIED REGISTERED

## 2019-03-06 PROCEDURE — 88307 TISSUE EXAM BY PATHOLOGIST: CPT | Mod: 26 | Performed by: OBSTETRICS & GYNECOLOGY

## 2019-03-06 PROCEDURE — 84132 ASSAY OF SERUM POTASSIUM: CPT | Performed by: ANESTHESIOLOGY

## 2019-03-06 PROCEDURE — 37000009 ZZH ANESTHESIA TECHNICAL FEE, EACH ADDTL 15 MIN: Performed by: OBSTETRICS & GYNECOLOGY

## 2019-03-06 PROCEDURE — 25800025 ZZH RX 258: Performed by: OBSTETRICS & GYNECOLOGY

## 2019-03-06 PROCEDURE — 36000063 ZZH SURGERY LEVEL 4 EA 15 ADDTL MIN: Performed by: OBSTETRICS & GYNECOLOGY

## 2019-03-06 PROCEDURE — 88307 TISSUE EXAM BY PATHOLOGIST: CPT | Performed by: OBSTETRICS & GYNECOLOGY

## 2019-03-06 PROCEDURE — 25000125 ZZHC RX 250: Performed by: OBSTETRICS & GYNECOLOGY

## 2019-03-06 PROCEDURE — 27210794 ZZH OR GENERAL SUPPLY STERILE: Performed by: OBSTETRICS & GYNECOLOGY

## 2019-03-06 PROCEDURE — 71000027 ZZH RECOVERY PHASE 2 EACH 15 MINS: Performed by: OBSTETRICS & GYNECOLOGY

## 2019-03-06 PROCEDURE — 82565 ASSAY OF CREATININE: CPT | Performed by: ANESTHESIOLOGY

## 2019-03-06 PROCEDURE — 36000093 ZZH SURGERY LEVEL 4 1ST 30 MIN: Performed by: OBSTETRICS & GYNECOLOGY

## 2019-03-06 PROCEDURE — 40000306 ZZH STATISTIC PRE PROC ASSESS II: Performed by: OBSTETRICS & GYNECOLOGY

## 2019-03-06 PROCEDURE — 25800030 ZZH RX IP 258 OP 636: Performed by: ANESTHESIOLOGY

## 2019-03-06 RX ORDER — GLYCOPYRROLATE 0.2 MG/ML
INJECTION, SOLUTION INTRAMUSCULAR; INTRAVENOUS PRN
Status: DISCONTINUED | OUTPATIENT
Start: 2019-03-06 | End: 2019-03-06

## 2019-03-06 RX ORDER — CEFAZOLIN SODIUM 2 G/100ML
2 INJECTION, SOLUTION INTRAVENOUS
Status: COMPLETED | OUTPATIENT
Start: 2019-03-06 | End: 2019-03-06

## 2019-03-06 RX ORDER — IBUPROFEN 200 MG
400 TABLET ORAL EVERY 4 HOURS PRN
COMMUNITY

## 2019-03-06 RX ORDER — OXYCODONE HYDROCHLORIDE 5 MG/1
5-10 TABLET ORAL
Status: DISCONTINUED | OUTPATIENT
Start: 2019-03-06 | End: 2019-03-06 | Stop reason: HOSPADM

## 2019-03-06 RX ORDER — BUPIVACAINE HYDROCHLORIDE 5 MG/ML
INJECTION, SOLUTION EPIDURAL; INTRACAUDAL PRN
Status: DISCONTINUED | OUTPATIENT
Start: 2019-03-06 | End: 2019-03-06 | Stop reason: HOSPADM

## 2019-03-06 RX ORDER — OXYCODONE HYDROCHLORIDE 5 MG/1
5 TABLET ORAL
Status: COMPLETED | OUTPATIENT
Start: 2019-03-06 | End: 2019-03-06

## 2019-03-06 RX ORDER — SODIUM CHLORIDE, SODIUM LACTATE, POTASSIUM CHLORIDE, CALCIUM CHLORIDE 600; 310; 30; 20 MG/100ML; MG/100ML; MG/100ML; MG/100ML
INJECTION, SOLUTION INTRAVENOUS CONTINUOUS
Status: DISCONTINUED | OUTPATIENT
Start: 2019-03-06 | End: 2019-03-06 | Stop reason: HOSPADM

## 2019-03-06 RX ORDER — PROPOFOL 10 MG/ML
INJECTION, EMULSION INTRAVENOUS PRN
Status: DISCONTINUED | OUTPATIENT
Start: 2019-03-06 | End: 2019-03-06

## 2019-03-06 RX ORDER — NALOXONE HYDROCHLORIDE 0.4 MG/ML
.1-.4 INJECTION, SOLUTION INTRAMUSCULAR; INTRAVENOUS; SUBCUTANEOUS
Status: DISCONTINUED | OUTPATIENT
Start: 2019-03-06 | End: 2019-03-06 | Stop reason: HOSPADM

## 2019-03-06 RX ORDER — MINERAL OIL
OIL (ML) MISCELLANEOUS PRN
Status: DISCONTINUED | OUTPATIENT
Start: 2019-03-06 | End: 2019-03-06 | Stop reason: HOSPADM

## 2019-03-06 RX ORDER — ONDANSETRON 2 MG/ML
INJECTION INTRAMUSCULAR; INTRAVENOUS PRN
Status: DISCONTINUED | OUTPATIENT
Start: 2019-03-06 | End: 2019-03-06

## 2019-03-06 RX ORDER — OXYCODONE HYDROCHLORIDE 5 MG/1
5-10 TABLET ORAL
Qty: 20 TABLET | Refills: 0 | Status: SHIPPED | OUTPATIENT
Start: 2019-03-06 | End: 2021-02-18

## 2019-03-06 RX ORDER — FENTANYL CITRATE 50 UG/ML
INJECTION, SOLUTION INTRAMUSCULAR; INTRAVENOUS PRN
Status: DISCONTINUED | OUTPATIENT
Start: 2019-03-06 | End: 2019-03-06

## 2019-03-06 RX ORDER — IBUPROFEN 600 MG/1
600 TABLET, FILM COATED ORAL
Status: COMPLETED | OUTPATIENT
Start: 2019-03-06 | End: 2019-03-06

## 2019-03-06 RX ORDER — PROPOFOL 10 MG/ML
INJECTION, EMULSION INTRAVENOUS CONTINUOUS PRN
Status: DISCONTINUED | OUTPATIENT
Start: 2019-03-06 | End: 2019-03-06

## 2019-03-06 RX ORDER — LIDOCAINE 40 MG/G
CREAM TOPICAL
Status: DISCONTINUED | OUTPATIENT
Start: 2019-03-06 | End: 2019-03-06 | Stop reason: HOSPADM

## 2019-03-06 RX ORDER — NEOSTIGMINE METHYLSULFATE 1 MG/ML
VIAL (ML) INJECTION PRN
Status: DISCONTINUED | OUTPATIENT
Start: 2019-03-06 | End: 2019-03-06

## 2019-03-06 RX ORDER — CEFAZOLIN SODIUM 1 G/3ML
1 INJECTION, POWDER, FOR SOLUTION INTRAMUSCULAR; INTRAVENOUS SEE ADMIN INSTRUCTIONS
Status: DISCONTINUED | OUTPATIENT
Start: 2019-03-06 | End: 2019-03-06 | Stop reason: HOSPADM

## 2019-03-06 RX ORDER — DEXAMETHASONE SODIUM PHOSPHATE 4 MG/ML
INJECTION, SOLUTION INTRA-ARTICULAR; INTRALESIONAL; INTRAMUSCULAR; INTRAVENOUS; SOFT TISSUE PRN
Status: DISCONTINUED | OUTPATIENT
Start: 2019-03-06 | End: 2019-03-06

## 2019-03-06 RX ORDER — LIDOCAINE HYDROCHLORIDE 10 MG/ML
INJECTION, SOLUTION INFILTRATION; PERINEURAL PRN
Status: DISCONTINUED | OUTPATIENT
Start: 2019-03-06 | End: 2019-03-06

## 2019-03-06 RX ORDER — ACETAMINOPHEN 500 MG
500-1000 TABLET ORAL EVERY 6 HOURS PRN
COMMUNITY

## 2019-03-06 RX ADMIN — PHENYLEPHRINE HYDROCHLORIDE 100 MCG: 10 INJECTION, SOLUTION INTRAMUSCULAR; INTRAVENOUS; SUBCUTANEOUS at 12:12

## 2019-03-06 RX ADMIN — PHENYLEPHRINE HYDROCHLORIDE 100 MCG: 10 INJECTION, SOLUTION INTRAMUSCULAR; INTRAVENOUS; SUBCUTANEOUS at 11:26

## 2019-03-06 RX ADMIN — GLYCOPYRROLATE 0.2 MG: 0.2 INJECTION, SOLUTION INTRAMUSCULAR; INTRAVENOUS at 11:26

## 2019-03-06 RX ADMIN — SODIUM CHLORIDE, POTASSIUM CHLORIDE, SODIUM LACTATE AND CALCIUM CHLORIDE: 600; 310; 30; 20 INJECTION, SOLUTION INTRAVENOUS at 11:10

## 2019-03-06 RX ADMIN — SODIUM CHLORIDE, POTASSIUM CHLORIDE, SODIUM LACTATE AND CALCIUM CHLORIDE: 600; 310; 30; 20 INJECTION, SOLUTION INTRAVENOUS at 12:27

## 2019-03-06 RX ADMIN — HYDROMORPHONE HYDROCHLORIDE 1 MG: 1 INJECTION, SOLUTION INTRAMUSCULAR; INTRAVENOUS; SUBCUTANEOUS at 11:50

## 2019-03-06 RX ADMIN — DEXAMETHASONE SODIUM PHOSPHATE 4 MG: 4 INJECTION, SOLUTION INTRA-ARTICULAR; INTRALESIONAL; INTRAMUSCULAR; INTRAVENOUS; SOFT TISSUE at 11:18

## 2019-03-06 RX ADMIN — LIDOCAINE HYDROCHLORIDE 50 MG: 10 INJECTION, SOLUTION INFILTRATION; PERINEURAL at 11:18

## 2019-03-06 RX ADMIN — CEFAZOLIN SODIUM 2 G: 2 INJECTION, SOLUTION INTRAVENOUS at 11:10

## 2019-03-06 RX ADMIN — PROPOFOL 30 MCG/KG/MIN: 10 INJECTION, EMULSION INTRAVENOUS at 11:30

## 2019-03-06 RX ADMIN — OXYCODONE HYDROCHLORIDE 5 MG: 5 TABLET ORAL at 14:37

## 2019-03-06 RX ADMIN — PROPOFOL 150 MG: 10 INJECTION, EMULSION INTRAVENOUS at 11:18

## 2019-03-06 RX ADMIN — GLYCOPYRROLATE 0.2 MG: 0.2 INJECTION, SOLUTION INTRAMUSCULAR; INTRAVENOUS at 12:13

## 2019-03-06 RX ADMIN — Medication 3 MG: at 12:59

## 2019-03-06 RX ADMIN — FENTANYL CITRATE 100 MCG: 50 INJECTION, SOLUTION INTRAMUSCULAR; INTRAVENOUS at 11:18

## 2019-03-06 RX ADMIN — ONDANSETRON 4 MG: 2 INJECTION INTRAMUSCULAR; INTRAVENOUS at 11:53

## 2019-03-06 RX ADMIN — ROCURONIUM BROMIDE 50 MG: 10 INJECTION INTRAVENOUS at 11:18

## 2019-03-06 RX ADMIN — GLYCOPYRROLATE 0.4 MG: 0.2 INJECTION, SOLUTION INTRAMUSCULAR; INTRAVENOUS at 12:59

## 2019-03-06 RX ADMIN — OXYCODONE HYDROCHLORIDE 5 MG: 5 TABLET ORAL at 13:46

## 2019-03-06 RX ADMIN — IBUPROFEN 600 MG: 600 TABLET ORAL at 14:40

## 2019-03-06 ASSESSMENT — LIFESTYLE VARIABLES: TOBACCO_USE: 1

## 2019-03-06 ASSESSMENT — ENCOUNTER SYMPTOMS: DYSRHYTHMIAS: 1

## 2019-03-06 ASSESSMENT — MIFFLIN-ST. JEOR: SCORE: 1209.11

## 2019-03-06 NOTE — ANESTHESIA POSTPROCEDURE EVALUATION
Patient: Gaby Aguilar    Procedure(s):  Single port total laparoscopic hysterectomy bilateral salpingo-oopherectomy    Diagnosis:Uterine fibroids and endometrial polyp  Diagnosis Additional Information: Pre-operative diagnosis: Uterine fibroids and endometrial polyp  Post-operative diagnosis Same  Procedure: Procedure(s):  Single port total laparoscopic hysterectomy bilateral salpingo-oopherectomy, uterosacral colposuspension      Anesthesia Type:  General, ETT    Note:  Anesthesia Post Evaluation    Patient location during evaluation: PACU  Patient participation: Able to fully participate in evaluation  Level of consciousness: awake  Pain management: adequate  Airway patency: patent  Cardiovascular status: acceptable  Respiratory status: acceptable  Hydration status: acceptable  PONV: controlled     Anesthetic complications: None          Last vitals:  Vitals:    03/06/19 1340 03/06/19 1345 03/06/19 1350   BP: 140/90     Pulse: 60 52    Resp: 8 8 20   Temp:      SpO2: 97% 96% 100%         Electronically Signed By: Aubrey Tafoya MD  March 6, 2019  1:53 PM

## 2019-03-06 NOTE — DISCHARGE INSTRUCTIONS
GENERAL ANESTHESIA OR SEDATION ADULT DISCHARGE INSTRUCTIONS   SPECIAL PRECAUTIONS FOR 24 HOURS AFTER SURGERY    IT IS NOT UNUSUAL TO FEEL LIGHT-HEADED OR FAINT, UP TO 24 HOURS AFTER SURGERY OR WHILE TAKING PAIN MEDICATION.  IF YOU HAVE THESE SYMPTOMS; SIT FOR A FEW MINUTES BEFORE STANDING AND HAVE SOMEONE ASSIST YOU WHEN YOU GET UP TO WALK OR USE THE BATHROOM.    YOU SHOULD REST AND RELAX FOR THE NEXT 24 HOURS AND YOU MUST MAKE ARRANGEMENTS TO HAVE SOMEONE STAY WITH YOU FOR AT LEAST 24 HOURS AFTER YOUR DISCHARGE.  AVOID HAZARDOUS AND STRENUOUS ACTIVITIES.  DO NOT MAKE IMPORTANT DECISIONS FOR 24 HOURS.    DO NOT DRIVE ANY VEHICLE OR OPERATE MECHANICAL EQUIPMENT FOR 24 HOURS FOLLOWING THE END OF YOUR SURGERY.  EVEN THOUGH YOU MAY FEEL NORMAL, YOUR REACTIONS MAY BE AFFECTED BY THE MEDICATION YOU HAVE RECEIVED.    DO NOT DRINK ALCOHOLIC BEVERAGES FOR 24 HOURS FOLLOWING YOUR SURGERY.    DRINK CLEAR LIQUIDS (APPLE JUICE, GINGER ALE, 7-UP, BROTH, ETC.).  PROGRESS TO YOUR REGULAR DIET AS YOU FEEL ABLE.    YOU MAY HAVE A DRY MOUTH, A SORE THROAT, MUSCLES ACHES OR TROUBLE SLEEPING.  THESE SHOULD GO AWAY AFTER 24 HOURS.    CALL YOUR DOCTOR FOR ANY OF THE FOLLOWING:  SIGNS OF INFECTION (FEVER, GROWING TENDERNESS AT THE SURGERY SITE, A LARGE AMOUNT OF DRAINAGE OR BLEEDING, SEVERE PAIN, FOUL-SMELLING DRAINAGE, REDNESS OR SWELLING.    IT HAS BEEN OVER 8 TO 10 HOURS SINCE SURGERY AND YOU ARE STILL NOT ABLE TO URINATE (PASS WATER).       LAPAROSCOPIC HYSTERECTOMY DISCHARGE INSTRUCTIONS    PLEASE RETURN TO THE CLINIC IN:  ____2 WEEKS  ____4 WEEKS  ____6 WEEKS  MAKE THIS APPOINTMENT AFTER YOU GET HOME IF IT HAS NOT ALREADY BEEN SCHEDULED.     YOU HAVE HAD A HYSTERECTOMY (SURGERY TO REMOVE YOUR UTERUS).  YOU CANNOT HAVE CHILDREN AFTER THIS SURGERY.  YOU WILL NO LONGER HAVE MONTHLY PERIODS, UNLESS YOU STILL HAVE YOUR CERVIX (CALLED SUBTOTAL HYSTERECTOMY).  IN THIS CASE, YOU MAY HAVE LIGHT MONTHLY BLEEDING.    DIET  YOU MAY FEEL LESS  HUNGRY AT FIRST.  TRY TO EAT A WELL-BALANCED DIET WITH LOTS OF PROTEINS, FRUITS, VEGETABLES AND WHOLE GRAINS.  AVOID SPICY AND GREASY FOODS.    DRINK PLENTY OF FLUIDS--AT LEAST 8 TALL GLASSES A DAY.  WATER IS BEST.  TRY TO LIMIT CAFFEINE (FOUND IN COFFEE, TEA AND COLA DRINKS).  THIS HELPS PREVENT CONSTIPATION (HARD STOOLS THAT ARE DIFFICULT TO PASS).    IF CONSTIPATION IS A PROBLEM, YOU MAY TAKE ONE OF THESE MEDICINES:  DOCUSATE (COLACE), DOCUSATE WITH CASANTHRANOL (BENIGNO-COLACE), PSYLLIUM (METAMUCIL) OR MILK OF MAGNESIA.  YOU CAN BUY THESE AT THE DRUG STORE.  FOLLOW THE INSTRUCTIONS LISTED ON THE LABEL.    ACTIVITY  YOU WILL NEED PLENTY OF REST AT FIRST.  SLOWLY GO BACK TO YOUR NORMAL ACTIVITIES.  IT WILL HELP TO TAKE SEVERAL SHORT WALKS DURING THE DAY.  IT IS OKAY TO CLIMB STAIRS, BUT USE THE HANDRAIL IN CASE YOU GET DIZZY.    DO NOT DRIVE WHILE USING STRONG PAIN MEDICINE (NARCOTICS).  AFTER THAT, DO NOT DRIVE UNTIL YOU CAN STOMP ON THE BRAKES WITHOUT PAIN.    DO NOT USE TAMPONS, DOUCHE OR HAVE SEX (INTERCOURSE) UNTIL YOU SEE YOUR DOCTOR AGAIN.    DON'T LIFT OR PUSH ANYTHING OVER 20 POUNDS UNTIL YOUR DOCTOR SAYS IT'S SAFE.  AVOID HEAVY OR STRENUOUS EXERCISE.    YOUR DOCTOR WILL TELL YOU WHEN YOU CAN SAFELY RETURN TO WORK.    PAIN CONTROL  YOU MAY HAVE PAIN AROUND YOUR INCISIONS (SURGERY WOUNDS).  THIS SHOULD IMPROVE OVER THE NEXT WEEK.  USE YOUR PAIN MEDICINE AS DIRECTED.  IF YOU HAVE INCREASED PAIN, PLEASE CALL YOUR CLINIC.  IT IS NORMAL TO FEEL A LITTLE SORE AFTER MILD EXERCISE.      FOR THE NEXT TWO DAYS, YOU MAY HAVE SOME PAIN IN YOUR SHOULDERS AND UPPER CHEST.  THIS IS FROM THE AIR PUMPED INTO YOUR BODY DURING THE SURGERY.  TO RELIEVE THIS TYPE OF PAIN, YOU MAY TAKE TYLENOL (ACETAMINOPHEN) OR ADVIL (IBUPROFEN).  FOLLOW THE INSTRUCTIONS LISTED ON THE LABEL.  DRINK A FULL GLASS OF WATER WITH EACH DOSE.  YOU CAN ALSO TRY LYING FLAT OR RAISING YOUR HIPS ABOVE SHOULDER LEVEL.    BATHING  YOU MAY SHOWER OR BATHE AT  ANY TIME.  IF YOU TAKE A BATH, DON'T ADD ANYTHING TO THE BATH WATER.    CARING FOR YOUR STITCHES  YOUR BODY WILL ABSORB YOUR STITCHES OVER TIME.  KEEP THEM CLEAN AND DRY.  WEAR LOOSE, COMFORTABLE CLOTHES.    NORMAL SYMPTOMS  YOU MAY NOTICE A SMALL AMOUNT OF BLEEDING FROM YOUR VAGINA.  THIS COULD LAST UP TO A WEEK.  YOU MAY ALSO HAVE BROWN FLUID LEAKING FROM THE VAGINA.  THIS COULD LAST FOR A FEW WEEKS.  WEAR PADS AS NEEDED.    YOU MAY HAVE BRUISING ON YOUR BELLY.  YOU MIGHT ALSO HAVE A PUFFY FEELING IN YOUR BELLY.    YOU MAY SEE A LITTLE BLOOD OR FLUID OOZING FROM THE INCISION.    HORMONES  IF WE REMOVED YOUR OVARIES, YOU MAY NEED HORMONE MEDICINE (HT, OR HORMONE THERAPY).  DISCUSS THIS WITH YOUR DOCTOR.  IF WE DID NOT REMOVE YOUR OVARIES, YOU SHOULD REACH MENOPAUSE AT THE NORMAL TIME (IN GENERAL, BETWEEN AGES 40 AND 55).    CALL YOUR DOCTOR IF YOU HAVE:  SEVERE CHILLS AND FEVER .4 DEGREES FAHRENHEIT OR HIGHER, TAKEN UNDER THE TONGUE.   BRIGHT RED BLOOD OR LARGE CLOTS COMING OUT OF THE VAGINA--ENOUGH TO SOAK ONE PAD IN AN HOUR.  INCREASED PAIN, WARMTH, SWELLING, REDNESS, BLEEDING OR FLUID LEAKING FROM THE SURGERY SITE.  URINE OR VAGINAL FLUID THAT SMELLS BAD.  YOU CANNOT URINATE (USE THE TOILET), IT BURNS WHEN YOU URINATE OR YOU NEED TO GO MORE OFTEN.  SEVERE NAUSEA (FEELING SICK TO YOUR STOMACH) OR VOMITING (THROWING UP).  INCREASED PAIN THAT YOU CANNOT CONTROL WITH MEDICINE.        You received 1 pain pill tablet of oxycodone 5mg at 1:45pm.  Second tab of oxycodone given at 2:45PM.  Ibuprofen given at 2:45PM.

## 2019-03-06 NOTE — BRIEF OP NOTE
[facility] Brief Operative Note    Pre-operative diagnosis: Uterine fibroids and endometrial polyp   Post-operative diagnosis Same   Procedure: Procedure(s):  Single port total laparoscopic hysterectomy bilateral salpingo-oopherectomy, uterosacral colposuspension   Surgeon(s): Surgeon(s) and Role:     * Laisha Kathleen MD - Primary   Assist: BOAZ Miranda   Estimated blood loss: 10 mL    Specimens: ID Type Source Tests Collected by Time Destination   A : Uterus, cervix, bilateral fallopian tubes and ovaries Tissue Uterus with Bilateral Ovaries and Fallopian Tubes SURGICAL PATHOLOGY EXAM Laisha Kathleen MD 3/6/2019 12:25 PM       Findings: Normal appearing tubes and ovaries

## 2019-03-06 NOTE — ANESTHESIA CARE TRANSFER NOTE
Patient: Gaby Aguilar    Procedure(s):  Single port total laparoscopic hysterectomy bilateral salpingo-oopherectomy    Diagnosis: Uterine fibroids and endometrial polyp  Diagnosis Additional Information: No value filed.    Anesthesia Type:   General, ETT     Note:  Airway :Face Mask  Patient transferred to:PACU        Vitals: (Last set prior to Anesthesia Care Transfer)    CRNA VITALS  3/6/2019 1238 - 3/6/2019 1312      3/6/2019             NIBP:  106/69    Pulse:  57    NIBP Mean:  86    SpO2:  100 %    Resp Rate (observed):  22                Electronically Signed By: FERNANDEZ Pulido CRNA  March 6, 2019  1:12 PM

## 2019-03-06 NOTE — ANESTHESIA PREPROCEDURE EVALUATION
Anesthesia Pre-Procedure Evaluation    Patient: Gaby Aguilar   MRN: [id number] : 1960          Preoperative Diagnosis: Uterine fibroids and endometrial polyp    Procedure(s):  Single port total laparoscopic hysterectomy bilateral salpingo-oopherectomy    Past Medical History:   Diagnosis Date     Allergic rhinitis 2011     Problem list name updated by automated process. Provider to review     Arthritis     hands     Diffuse cystic mastopathy 2012     Hx of atrial flutter 2018     Symptomatic menopausal or female climacteric states 2008     Past Surgical History:   Procedure Laterality Date     HC CORRECT BUNION,SIMPLE Right      HC TOOTH EXTRACTION W/FORCEP  age 20     Anesthesia Evaluation     . Pt has had prior anesthetic.            ROS/MED HX    ENT/Pulmonary:  - neg pulmonary ROS   (+)tobacco use, Past use , . .   (-) sleep apnea   Neurologic:       Cardiovascular:  - neg cardiovascular ROS   (+) ----. : . . . :. dysrhythmias a-flutter, .       METS/Exercise Tolerance:     Hematologic:         Musculoskeletal:   (+) arthritis, , , -       GI/Hepatic:        (-) GERD   Renal/Genitourinary:         Endo:         Psychiatric:         Infectious Disease:         Malignancy:         Other:                          Physical Exam      Airway   Mallampati: II  TM distance: >3 FB  Neck ROM: full    Dental     Cardiovascular   Rhythm and rate: regular and normal      Pulmonary    breath sounds clear to auscultation            Lab Results   Component Value Date    WBC 9.5 2018    HGB 14.2 2018    HCT 42.2 2018     2018    SED 10 2018     2018    POTASSIUM 3.5 2018    CHLORIDE 107 2018    CO2 28 2018    BUN 16 2018    CR 0.70 2018     (H) 2018    VINH 9.8 2018    MAG 2.4 (H) 2018    ALBUMIN 4.7 2016    PROTTOTAL 6.8 2016    ALT 16 2016    AST 15 2016    ALKPHOS 62  "11/09/2016    BILITOTAL 0.7 11/09/2016    TSH 1.58 12/07/2018    HCGS Negative 12/07/2018       Preop Vitals  BP Readings from Last 3 Encounters:   03/01/19 126/68   01/23/19 136/79   12/19/18 153/82    Pulse Readings from Last 3 Encounters:   03/01/19 54   01/23/19 54   12/19/18 64      Resp Readings from Last 3 Encounters:   03/01/19 16   12/14/18 16   12/07/18 22    SpO2 Readings from Last 3 Encounters:   03/01/19 98%   01/23/19 98%   12/19/18 97%      Temp Readings from Last 1 Encounters:   03/01/19 98.2  F (36.8  C) (Oral)    Ht Readings from Last 1 Encounters:   03/01/19 1.613 m (5' 3.5\")      Wt Readings from Last 1 Encounters:   03/01/19 64.7 kg (142 lb 9.6 oz)    Estimated body mass index is 24.86 kg/m  as calculated from the following:    Height as of 3/1/19: 1.613 m (5' 3.5\").    Weight as of 3/1/19: 64.7 kg (142 lb 9.6 oz).       Anesthesia Plan      History & Physical Review  History and physical reviewed and following examination; no interval change.    ASA Status:  2 .    NPO Status:  > 8 hours    Plan for General and ETT with Intravenous and Propofol induction. Maintenance will be Balanced.    PONV prophylaxis:  Ondansetron (or other 5HT-3) and Dexamethasone or Solumedrol       Postoperative Care  Postoperative pain management:  IV analgesics, Oral pain medications and Multi-modal analgesia.      Consents  Anesthetic plan, risks, benefits and alternatives discussed with:  Patient..                 Aubrey Tafoya MD                    .  "

## 2019-03-07 LAB — COPATH REPORT: NORMAL

## 2019-03-07 NOTE — OP NOTE
Procedure Date: 03/06/2019      PREOPERATIVE DIAGNOSIS:  Uterine fibroids, endometrial polyp.        POSTOPERATIVE DIAGNOSIS:  Uterine fibroids, endometrial polyp.        PROCEDURES PERFORMED:   Single site total laparoscopic hysterectomy, bilateral salpingo-oophorectomy, uterosacral colposuspension.      SURGEON:  Laisha Kathleen MD      ANESTHESIA:  General.      ESTIMATED BLOOD LOSS:  10 mL.      FINDINGS:  The tubes and ovaries appeared normal.      INDICATIONS FOR PROCEDURE:  The patient is a 58-year-old who initially presented with some pelvic pain.  An ultrasound in the office demonstrated endometrial thickening.  She underwent hysteroscopy with dilation and curettage with Dr. Osmany Delgado which demonstrated a large endometrial polyp that he was unable to resect entirely.  She was advised at this time to undergo hysterectomy.  She was counseled on risks, benefits and alternatives.      DESCRIPTION OF PROCEDURE:  After obtaining informed consent, the patient was taken to the operating room where she was prepped and draped in the usual sterile fashion and placed in dorsal lithotomy position.  Exam under anesthesia revealed a small mobile uterus.  A Hollingsworth catheter was placed in the bladder.  A sterile speculum was placed in the vagina.  The anterior lip of the cervix was tagged with an #0 Vicryl suture.  The medium VCare probe was then introduced and the balloon was inflated.  The cup was cinched down around the cervix.  Attention was turned to the patient's abdomen where a 15 mm vertical umbilical incision was made with a scalpel.  Cautery was used to dissect the fascia.  The fascia was tagged bilaterally with #0 Vicryl sutures then incised at the midline and the Olympus TriPort Plus was introduced and deployed.  The abdomen was insufflated with carbon dioxide gas.  The patient was placed in Trendelenburg.  The bowel was swept from the pelvis.  The uterus was small and mobile with normal-appearing tubes and  ovaries.  The left infundibulopelvic ligament was cauterized and transected with the Thunderbeat device.  Sequential bites were taken along the mesosalpinx.  The left round ligament was cauterized and transected and sequential bites were taken down the broad ligament.  The anterior vesicouterine peritoneum was then taken down.  This process was repeated for the right side of the pelvis beginning with the right infundibulopelvic ligament continuing along the mesosalpinx, the round ligament and the broad ligament.  The vesicouterine peritoneum was taken down, the bladder was then dissected from the cervix.  The uterine vessels were cauterized bilaterally and the cardinal ligament was cauterized and transected.  At this time, the VCare cup ridge was palpated.  A colpotomy was made overlying the VCare cup ridge using the Thunderbeat device and a circumferential colpotomy incision was continued along the ridge.  The uterus was then withdrawn through the vagina.  A laparotomy sponge was placed in a sterile glove which was placed in the vagina to help reestablish the pneumoperitoneum.  The right vaginal cuff angle was then isolated with an #0 Vicryl suture using the EndoStitch device.  Extracorporeal knot tying was performed.  This was repeated for the left vaginal cuff angle.  The vaginal cuff angles were then elevated.  The remainder of the vaginal cuff was closed in an interrupted fashion using 6 individual #0 Vicryl sutures with the EndoStitch device.        At this time, a colposuspension was performed.  The vaginal cuff angles were elevated to isolate the uterosacral ligaments.  The #0 Vicryl suture was used to plicate the uterosacral ligaments at the posterior vaginal cuff.  The pelvis was now copiously irrigated.  The ureters were seen peristalsing in the pelvic sidewalls bilaterally.  All surgical sites were hemostatic.  The gas was released from the abdomen and the port was removed.  The fascia was closed with #0  Vicryl in a continuous fashion.  The skin was closed with 4-0 Monocryl in a running subcuticular fashion.  Inspection of the vagina did reveal one area that showed a mucosal defect.  An #0 Vicryl suture was then used to place a horizontal mattress suture over this area.        The Hollingsworth catheter was removed.  The patient tolerated the procedure without difficulty and she was taken to the recovery room in stable condition.  Sponge, lap and needle counts were correct.         YE CROWLEY MD             D: 2019   T: 2019   MT: COLIN      Name:     JOSÉ MANUEL JAIMES   MRN:      [id number]        Account:        [id number]   :      1960           Procedure Date: 2019      Document: [id number]

## 2019-03-20 ENCOUNTER — TRANSFERRED RECORDS (OUTPATIENT)
Dept: FAMILY MEDICINE | Facility: CLINIC | Age: 59
End: 2019-03-20

## 2019-03-20 LAB — MAMMOGRAM: NORMAL

## 2019-05-15 ENCOUNTER — DOCUMENTATION ONLY (OUTPATIENT)
Dept: CARE COORDINATION | Facility: CLINIC | Age: 59
End: 2019-05-15

## 2019-06-12 ENCOUNTER — OFFICE VISIT (OUTPATIENT)
Dept: CARDIOLOGY | Facility: CLINIC | Age: 59
End: 2019-06-12
Attending: INTERNAL MEDICINE
Payer: COMMERCIAL

## 2019-06-12 VITALS
HEIGHT: 64 IN | SYSTOLIC BLOOD PRESSURE: 132 MMHG | OXYGEN SATURATION: 100 % | WEIGHT: 143.7 LBS | DIASTOLIC BLOOD PRESSURE: 73 MMHG | BODY MASS INDEX: 24.53 KG/M2 | HEART RATE: 55 BPM

## 2019-06-12 DIAGNOSIS — Z13.220 SCREENING FOR LIPID DISORDERS: ICD-10-CM

## 2019-06-12 DIAGNOSIS — I48.92 ATRIAL FLUTTER, UNSPECIFIED TYPE (H): ICD-10-CM

## 2019-06-12 LAB
CHOLEST SERPL-MCNC: 183 MG/DL
HDLC SERPL-MCNC: 44 MG/DL
LDLC SERPL CALC-MCNC: 98 MG/DL
NONHDLC SERPL-MCNC: 139 MG/DL
TRIGL SERPL-MCNC: 207 MG/DL

## 2019-06-12 PROCEDURE — 36415 COLL VENOUS BLD VENIPUNCTURE: CPT | Performed by: INTERNAL MEDICINE

## 2019-06-12 PROCEDURE — 99214 OFFICE O/P EST MOD 30 MIN: CPT | Mod: ZP | Performed by: INTERNAL MEDICINE

## 2019-06-12 PROCEDURE — G0463 HOSPITAL OUTPT CLINIC VISIT: HCPCS | Mod: ZF

## 2019-06-12 PROCEDURE — 80061 LIPID PANEL: CPT | Performed by: INTERNAL MEDICINE

## 2019-06-12 ASSESSMENT — PAIN SCALES - GENERAL: PAINLEVEL: NO PAIN (0)

## 2019-06-12 ASSESSMENT — ENCOUNTER SYMPTOMS
SKIN CHANGES: 0
NAIL CHANGES: 1
POOR WOUND HEALING: 0

## 2019-06-12 ASSESSMENT — MIFFLIN-ST. JEOR: SCORE: 1216.82

## 2019-06-12 NOTE — RESULT ENCOUNTER NOTE
Results discussed directly with patient during clinic visit. Any further details documented in the progress note.     Ivory Young LPN

## 2019-06-12 NOTE — LETTER
6/12/2019      RE: Gaby Aguilar  [address] [facility] [address]       Dear Colleague,    Thank you for the opportunity to participate in the care of your patient, Gaby Aguilar, at the [facility]. Please see a copy of my visit note below.    [id number]      Please do not hesitate to contact me if you have any questions/concerns.     Sincerely,     David Doyle MD

## 2019-06-12 NOTE — NURSING NOTE
Vitals completed successfully and medication reconciled.     Jaqueline Sandhu, VINAY  9:28 AM  Chief Complaint   Patient presents with     Follow Up     6 month follow up A Flutter

## 2019-06-12 NOTE — NURSING NOTE
Cardiac Monitors: Patient was instructed regarding the indication, function, care and prompt return of a event monitor. The monitor was placed on the patient with instructions regarding care of the skin electrodes and monitor, as well as documentation in the patient diary. Patient demonstrated understanding of this information and agreed to call with further questions or concerns.  Med Reconcile: Reviewed and verified all current medications with the patient. The updated medication list was printed and given to the patient.  Return Appointment: Patient given instructions regarding scheduling next clinic visit. Patient demonstrated understanding of this information and agreed to call with further questions or concerns.  Patient stated she understood all health information given and agreed to call with further questions or concerns.    Ivory Young LPN

## 2019-06-12 NOTE — LETTER
Date:June 13, 2019      Patient was self referred, no letter generated. Do not send.        [facility] Information

## 2019-06-12 NOTE — PATIENT INSTRUCTIONS
Patient Instructions:  It was a pleasure to see you in the cardiology clinic today.      If you have any questions, you can reach my nurse, Ivory Young LPN, at [phone].  Press Option #1 for the [facility], and then press Option #4 for nursing.    We are encouraging the use of [employer] to communicate with your HealthCare Provider    Medication Changes: None.    Studies Ordered: 14 day ZioPatch Cardiac Event Monitor in 5 months.  You may call [phone] to schedule at [facility].    The results from today include: Labs.    Please follow up: With Dr. Doyle in 6 months once ZioPatch results have returned.    Sincerely,    David Doyle MD     If you have an urgent need after hours (8:00 am to 4:30 pm) please call [phone] and ask for the cardiology fellow on call.

## 2019-06-14 NOTE — PROGRESS NOTES
"2019             Reshma Enriquez MD    [facility]    [address] [facility]   [place] [address]       RE:    Gaby Aguilar   MRN:  [id number]   :  1960      Dear Dr. Enriquez:      If it was a pleasure participating in the care of your patient, Ms. Gaby Aguilar.  As you know, she is a 58-year-old lady who I see today for paroxysmal atrial  fibrillation.      Her past medical history is significant for the followin.  Hyperlipidemia.   2.  Allergic rhinitis.   3.  Amenorrhea.   4.  Hysterectomy.      Her cardiac history is significant for documented paroxysmal fibrillation/atrial flutter.  This is confirmed on EK 2018 and also on ZIO Patch monitor in 2018 which confirmed that her overall atrial fibrillation burden was approximately 5%.      Her echocardiogram on 2019 reveals structurally normal heart.  Her [id number] score is 1 for female gender only.      I last saw her 2019 and at that time she was doing adequately.  She presents today for continuing care.      Since our last visit, we had increased her metoprolol from 25 mg a day to 25 mg in the morning and 12.5 mg at night in hopes of providing additional rhythm control.      She has not really been keeping track of her episodes, but says that she may feel \"something\" for less than a minute or 2, perhaps once a week.  She cannot tell what actually sets these episodes off and she has not really been paying attention to them very closely as of late.  She otherwise feels fine.  She rides her bike for half hour in the morning, walks for 30 minutes a day or walks on a treadmill and does yoga once a week without symptoms. She denies any new chest pain, shortness of breath, PND, orthopnea, edema, syncope or near-syncope.  She is tolerating her metoprolol well without gross side effects.  She has no complaints.      CURRENT MEDICATIONS:     1.  Metoprolol 12.5 mg in the evening, 25 mg in the morning.    "   PHYSICAL EXAMINATION:     VITAL SIGNS:  Blood pressure 130/70 with a pulse of 85.  Her weight is 143 pounds.   NECK:  Exam reveals no significant jugular venous distention.   LUNGS:  Clear to auscultation.   RESPIRATORY:  Normal.   CARDIAC:  Reveals a regular rhythm, no obvious murmur or gallop appreciated.   ABDOMEN:  Belly soft, nontender.   EXTREMITIES:  Without gross edema.      Echocardiogram 01/09/2019 reveals ejection fraction of 60%-65% without significant valvular pathology.      ZIO Patch monitor in 12/2018 reveals an overall burden of atrial fibrillation of 5%, 2 pauses greater than 3 seconds.  No symptoms mentioned.        IMPRESSION:      Gaby is a 58-year-old lady whose cardiac history is significant for documented paroxysmal atrial fibrillation/atrial flutter.  [id number] score is 1 for female gender only.  She has not been paying attention to whether or not she has been experiencing episodes of atrial fibrillation lately.      Back in December, her monitor revealed the overall burden of 5% with 2 pauses greater than 3 seconds with no symptoms mentioned.  Her current risk for thromboembolic event is approximately 1% per year and full anticoagulation would not be definitively indicated at present.        PLAN:     1.  She will discontinue her baby aspirin as the risk of bleeding would offset any benefit that she might experience.     2.  We will perform another monitor in 5 months and see her in followup in 6 months and she will also try to keep more close track of her symptoms and correlate them with the monitor results.      We will discuss her experience findings and correlate that with her monitor in 6 months.  Discussed whether if she could tolerate or if an increase her metoprolol dosing would be recommended  or not and whether or not it would be necessary as we would hopefully quantify her atrial fibrillation burden then as well.     3.  Follow up in 6 months or earlier if needed.       Additionally, her triglycerides are mildly elevated today and she is confident that if she loses a few more pounds that this will normalize as it used to be much higher when her weight was much higher.  Lifestyle modification would be indicated at present.      Once again, it was a pleasure participating in the care of your patient, José Manuel Jaimes .  Please feel free to contact me anytime if you have questions regarding her care in the future.         Sincerely,      NANCY MORIN MD      Addendum 10/28/19:    Ziopatch monitor reviewed sxs correspond afib/flutter with RVR, 5 % burden, longest <2 hours    Pauses noted, however predominantly after afib/flutter terminates during sleep     Impression:  Paroxysmal afib/flutter with RVR, 5% burden    Plan:    Follow up as scheduled, will discuss sxs, clinical status and possibility of uptitrating beta blocker if can tolerate    Would consider EP referral if sxs interfere with quality of life despite maximal medical therapy         D: 2019   T: 2019   MT: SHEREEN      Name:     JOSÉ MANUEL JAIMES   MRN:      [id number]        Account:      [id number]   :      1960      Document: [id number]

## 2019-07-09 DIAGNOSIS — Z86.79 HX OF ATRIAL FLUTTER: ICD-10-CM

## 2019-07-11 RX ORDER — METOPROLOL SUCCINATE 25 MG/1
TABLET, EXTENDED RELEASE ORAL
Qty: 135 TABLET | Refills: 3 | Status: SHIPPED | OUTPATIENT
Start: 2019-07-11 | End: 2019-08-01

## 2019-07-19 ENCOUNTER — TELEPHONE (OUTPATIENT)
Dept: FAMILY MEDICINE | Facility: CLINIC | Age: 59
End: 2019-07-19

## 2019-07-19 NOTE — TELEPHONE ENCOUNTER
Patient called this asking for a referral / recommendation for a Rheumatologist. States that her dermatologist is suggesting / referring her. Patient has [employer] [place] of [place] insurance. This plan does not require a insurance referral. I did give patient:    [facility]  [address]  [address]  [facility] [address] [phone] - appt line  [phone] - fax line    And    [facility] [place]   [address] [facility] [address] [phone] - appt line  [phone] - fax    Patient will call to make her appt. Will call us back with her appt info.     FYSAMUEL

## 2019-08-01 ENCOUNTER — TELEPHONE (OUTPATIENT)
Dept: CARDIOLOGY | Facility: CLINIC | Age: 59
End: 2019-08-01

## 2019-08-01 DIAGNOSIS — Z86.79 HX OF ATRIAL FLUTTER: ICD-10-CM

## 2019-08-01 RX ORDER — METOPROLOL SUCCINATE 25 MG/1
TABLET, EXTENDED RELEASE ORAL
Qty: 135 TABLET | Refills: 3 | Status: SHIPPED | OUTPATIENT
Start: 2019-08-01 | End: 2019-11-20

## 2019-10-04 ENCOUNTER — HOSPITAL ENCOUNTER (OUTPATIENT)
Dept: CARDIOLOGY | Facility: CLINIC | Age: 59
Discharge: HOME | End: 2019-10-04
Attending: INTERNAL MEDICINE | Admitting: INTERNAL MEDICINE
Payer: COMMERCIAL

## 2019-10-04 DIAGNOSIS — I48.92 ATRIAL FLUTTER, UNSPECIFIED TYPE (H): ICD-10-CM

## 2019-10-04 PROCEDURE — 0296T ZIO PATCH HOLTER ADULT PEDIATRIC GREATER THAN 48 HRS: CPT

## 2019-10-04 PROCEDURE — 0298T ZIO PATCH HOLTER ADULT PEDIATRIC GREATER THAN 48 HRS: CPT | Performed by: INTERNAL MEDICINE

## 2019-11-20 ENCOUNTER — OFFICE VISIT (OUTPATIENT)
Dept: CARDIOLOGY | Facility: CLINIC | Age: 59
End: 2019-11-20
Attending: INTERNAL MEDICINE
Payer: COMMERCIAL

## 2019-11-20 VITALS
SYSTOLIC BLOOD PRESSURE: 123 MMHG | OXYGEN SATURATION: 98 % | HEIGHT: 64 IN | HEART RATE: 60 BPM | BODY MASS INDEX: 23.76 KG/M2 | DIASTOLIC BLOOD PRESSURE: 50 MMHG | WEIGHT: 139.2 LBS

## 2019-11-20 DIAGNOSIS — I48.92 ATRIAL FLUTTER, UNSPECIFIED TYPE (H): ICD-10-CM

## 2019-11-20 DIAGNOSIS — Z13.220 SCREENING FOR LIPID DISORDERS: Primary | ICD-10-CM

## 2019-11-20 DIAGNOSIS — Z86.79 HX OF ATRIAL FLUTTER: ICD-10-CM

## 2019-11-20 PROCEDURE — 99214 OFFICE O/P EST MOD 30 MIN: CPT | Mod: ZP | Performed by: INTERNAL MEDICINE

## 2019-11-20 PROCEDURE — G0463 HOSPITAL OUTPT CLINIC VISIT: HCPCS | Mod: 25,ZF

## 2019-11-20 RX ORDER — BIOTIN 1 MG
1000 TABLET ORAL DAILY
COMMUNITY

## 2019-11-20 RX ORDER — METOPROLOL SUCCINATE 25 MG/1
25 TABLET, EXTENDED RELEASE ORAL 2 TIMES DAILY
Qty: 180 TABLET | Refills: 2 | Status: SHIPPED | OUTPATIENT
Start: 2019-11-20 | End: 2020-09-17

## 2019-11-20 ASSESSMENT — PAIN SCALES - GENERAL: PAINLEVEL: NO PAIN (0)

## 2019-11-20 ASSESSMENT — MIFFLIN-ST. JEOR: SCORE: 1191.41

## 2019-11-20 NOTE — NURSING NOTE
Cardiac Monitors: Patient was instructed regarding the indication, function, care and prompt return of a event monitor. The monitor was placed on the patient with instructions regarding care of the skin electrodes and monitor, as well as documentation in the patient diary. Patient demonstrated understanding of this information and agreed to call with further questions or concerns.  Labs: Patient was instructed to return for the next laboratory testing in 7 months. Patient demonstrated understanding of this information and agreed to call with further questions or concerns.   Med Reconcile: Reviewed and verified all current medications with the patient. The updated medication list was printed and given to the patient.  Return Appointment: Patient given instructions regarding scheduling next clinic visit. Patient demonstrated understanding of this information and agreed to call with further questions or concerns.  Medication Change: Patient was educated regarding prescribed medication change, including discussion of the indication, administration, side effects, and when to report to MD or RN. Patient demonstrated understanding of this information and agreed to call with further questions or concerns.  Patient stated she understood all health information given and agreed to call with further questions or concerns.    Ivory Young LPN

## 2019-11-20 NOTE — PATIENT INSTRUCTIONS
Patient Instructions:  It was a pleasure to see you in the cardiology clinic today.      If you have any questions, you can reach my nurse, Ivory MACKEY LPN, at [phone].  Press Option #1 for the [facility], and then press Option #4 for nursing.    We are encouraging the use of [employer] to communicate with your HealthCare Provider    Medication Changes: Increase Toprol to 25 mg twice daily.    Recommendations:     - A referral was placed for the Sleep Medicine Clinic.  They should call you to schedule.  If you do not hear from them within 72 business hours, please call [phone] to schedule.  - Fasting lipids prior to your next visit.    Studies Ordered: A 14 day ZioPatch in 6 months.    The results from today include: ZioPatch.    Please follow up: With Dr. Doyle in about 7 months once ZioPatch results are returned.    Sincerely,    David Doyle MD     If you have an urgent need after hours (8:00 am to 4:30 pm) please call [phone] and ask for the cardiology fellow on call.

## 2019-11-20 NOTE — NURSING NOTE
Chief Complaint   Patient presents with     Follow Up     6 Mo F/U     Vitals were taken and medications were reconciled.     Lizet Parry RMA  7:14 AM

## 2019-11-20 NOTE — PROGRESS NOTES
Service Date: 2019      Reshma Enriquez MD    [facility]    [address] [facility]   [place] [address]       RE:    Gaby Aguilar   MRN:  [id number]   :  1960      Dear Dr. Enriquez:      It was a pleasure participating in the care of your patient, Ms. Gaby Aguilar.  As you know, she is a 59-year-old lady who I see today for paroxysmal atrial fibrillation.      Her past medical history is significant for the followin.  Hyperlipidemia.   2.  Allergic rhinitis.   3.  Amenorrhea.   4.  Hysterectomy.      Her cardiac history is significant for documented paroxysmal fibrillation/atrial flutter.  This was confirmed on ZIO Patch monitor in 2018, which confirmed that her overall atrial fibrillation burden was approximately 5%.      Her echocardiogram 2019 reveals structurally normal heart.  [id number] score is 1 for female gender only.      I last saw her 2019.  At that time she was doing adequately.  She presents today for continuing care.      Since our last visit, we had increased her metoprolol from 25 mg a day to 25 mg in the morning and 12.5 at night in hopes of providing additional rhythm control.      She presents today for followup.      Since our last visit, she really has not felt any different.  She has not felt any gross palpitations.  She continues to ride her bike for half hour in the morning, walks for 30 minutes a day on the treadmill and does yoga once a week.  She denies new chest pain, shortness of breath, PND, orthopnea, edema, palpitations, syncope or syncope.  She has not been grossly orthostatic.  Perhaps twice a week, when she stands up too quickly, she will have very fleeting lightheadedness, but otherwise has no other complaints.      In terms of her present medications, she is taking metoprolol succinate 25 in the morning, 12.5 every evening.      PHYSICAL EXAMINATION:     VITAL SIGNS:  Blood pressure is 123/50 with a pulse 60.  Her weight  is 139 pounds.   NECK:  Exam reveals no significant jugular venous distention.   LUNGS:  Clear to auscultation.  Respiratory effort is normal.   CARDIAC:  Reveals a regular rate and rhythm, no obvious murmur or gallop appreciated.   ABDOMEN:  Belly soft, nontender.   EXTREMITIES:  Without gross edema.      Echocardiogram 01/09/2019 reveals an ejection fraction of 60%-65% without gross valvular pathology.      His ZIO Patch monitor 10/04/2019 reveals that her overall atrial fibrillation burden continues to be 5%.  Longest episode 1 hour 28 minutes, pauses with termination of atrial fibrillation, longest 3.9 seconds, but mostly during sleeping hours.  Symptoms correlate with atrial fibrillation and rapid rate at 143 beats per minute.        IMPRESSION:      Gaby is a 59-year-old lady whose cardiac history is significant for documented paroxysmal fibrillation/atrial flutter.  Her [id number] score is 1 for female gender only.        Her echo on 01/09/2019 reveals a structurally normal heart.  Most recent ZIO Patch monitor 10/04/2019 reveals that her atrial fibrillation burden was approximately 5%, and she has asymptomatic pauses upon termination of atrial fibrillation.  Longest 3.9 seconds generally during sleeping hours.      With her current risk for thromboembolic event being estimated approximately 1% per year, full anticoagulation would not be definitively indicated at present.        PLAN:     1.  Increase Toprol from 25 mg in the morning and 12.5 at night to 25 mg twice daily.     2.  Sleep evaluation to rule out bleeding disorder at night that might trigger her atrial fibrillation to occur during her sleeping hours.     3.  Repeat ZIO Patch monitor in 6 months with followup when results are ready, earlier if needed.      Once again, it was a pleasure participating in the care of your patient, Ms. Gaby Aguilar.  Feel free to contact me anytime if any questions regarding her care in the future.      Sincerely,          David Doyle MD            D: 2019   T: 2019   MT: [facility]      Name:     JOSÉ MANUEL JAIMES   MRN:      [id number]        Account:      [id number]   :      1960           Service Date: 2019      Document: [id number]

## 2019-11-20 NOTE — LETTER
2019      RE: Gaby Aguilar  [address] [facility] [address]       Dear Colleague,    Thank you for the opportunity to participate in the care of your patient, Gaby Aguilar, at the [facility]. Please see a copy of my visit note below.        Service Date: 2019      Reshma Enriquez MD    [facility]    [facility] [address]   [place] [address]       RE:    Gaby Aguilar   MRN:  [id number]   :  1960      Dear Dr. Enriquez:      It was a pleasure participating in the care of your patient, Ms. Gaby Aguilar.  As you know, she is a 59-year-old lady who I see today for paroxysmal atrial fibrillation.      Her past medical history is significant for the followin.  Hyperlipidemia.   2.  Allergic rhinitis.   3.  Amenorrhea.   4.  Hysterectomy.      Her cardiac history is significant for documented paroxysmal fibrillation/atrial flutter.  This was confirmed on ZIO Patch monitor in 2018, which confirmed that her overall atrial fibrillation burden was approximately 5%.      Her echocardiogram 2019 reveals structurally normal heart.  [id number] score is 1 for female gender only.      I last saw her 2019.  At that time she was doing adequately.  She presents today for continuing care.      Since our last visit, we had increased her metoprolol from 25 mg a day to 25 mg in the morning and 12.5 at night in hopes of providing additional rhythm control.      She presents today for followup.      Since our last visit, she really has not felt any different.  She has not felt any gross palpitations.  She continues to ride her bike for half hour in the morning, walks for 30 minutes a day on the treadmill and does yoga once a week.  She denies new chest pain, shortness of breath, PND, orthopnea, edema, palpitations, syncope or syncope.  She has not been grossly orthostatic.  Perhaps twice a week, when she  stands up too quickly, she will have very fleeting lightheadedness, but otherwise has no other complaints.      In terms of her present medications, she is taking metoprolol succinate 25 in the morning, 12.5 every evening.      PHYSICAL EXAMINATION:   VITAL SIGNS:  Blood pressure is 123/50 with a pulse 60.  Her weight is 139 pounds.   NECK:  Exam reveals no significant jugular venous distention.   LUNGS:  Clear to auscultation.  Respiratory effort is normal.   CARDIAC:  Reveals a regular rate and rhythm, no obvious murmur or gallop appreciated.   ABDOMEN:  Belly soft, nontender.   EXTREMITIES:  Without gross edema.      Echocardiogram 01/09/2019 reveals an ejection fraction of 60%-65% without gross valvular pathology.      His ZIO Patch monitor 10/04/2019 reveals that her overall atrial fibrillation burden continues to be 5%.  Longest episode 1 hour 28 minutes, pauses with termination of atrial fibrillation, longest 3.9 seconds, but mostly during sleeping hours.  Symptoms correlate with atrial fibrillation and rapid rate at 143 beats per minute.      IMPRESSION:  Gaby is a 59-year-old lady whose cardiac history is significant for documented paroxysmal fibrillation/atrial flutter.  Her [id number] score is 1 for female gender only.        Her echo on 01/09/2019 reveals a structurally normal heart.  Most recent ZIO Patch monitor 10/04/2019 reveals that her atrial fibrillation burden was approximately 5%, and she has asymptomatic pauses upon termination of atrial fibrillation.  Longest 3.9 seconds generally during sleeping hours.      With her current risk for thromboembolic event being estimated approximately 1% per year, full anticoagulation would not be definitively indicated at present.      PLAN:   1.  Increase Toprol from 25 mg in the morning and 12.5 at night to 25 mg twice daily.   2.  Sleep evaluation to rule out bleeding disorder at night that might trigger her atrial fibrillation to occur during her sleeping  hours.   3.  Repeat ZIO Patch monitor in 6 months with followup when results are ready, earlier if needed.      Once again, it was a pleasure participating in the care of your patient, Ms. José Manuel Jaimes.  Feel free to contact me anytime if any questions regarding her care in the future.      Sincerely,           NANCY MORIN MD             D: 2019   T: 2019   MT: [facility]      Name:     JOSÉ MANUEL JAIMES   MRN:      [id number]        Account:      [id number]   :      1960           Service Date: 2019      Document: [id number]

## 2019-12-15 ENCOUNTER — HEALTH MAINTENANCE LETTER (OUTPATIENT)
Age: 59
End: 2019-12-15

## 2020-02-04 NOTE — TELEPHONE ENCOUNTER
Correction: rx sent to [facility] per pt/ insurance.  
[facility]    Phone Message    May a detailed message be left on voicemail: yes    Reason for Call: Other: Pt is trying to get metoprolol succinate ER (TOPROL-XL) 25 MG 24 hr tablet filled and they told her she needed to contact her doctor, she wants to use home delivery for her medicine, please call to discuss further    [facility] - [address] [phone] (Phone)  [phone] (Fax)         Action Taken: Message routed to:  [facility] ([facility] Cardio  
current order re faxed to pharmacy. Sent to [facility] 7/11/19  
none

## 2020-02-11 ENCOUNTER — OFFICE VISIT (OUTPATIENT)
Dept: SLEEP MEDICINE | Facility: CLINIC | Age: 60
End: 2020-02-11
Attending: INTERNAL MEDICINE
Payer: COMMERCIAL

## 2020-02-11 VITALS
BODY MASS INDEX: 22.88 KG/M2 | SYSTOLIC BLOOD PRESSURE: 97 MMHG | WEIGHT: 134 LBS | DIASTOLIC BLOOD PRESSURE: 65 MMHG | HEIGHT: 64 IN | HEART RATE: 52 BPM | OXYGEN SATURATION: 99 %

## 2020-02-11 DIAGNOSIS — G47.33 OSA (OBSTRUCTIVE SLEEP APNEA): Primary | ICD-10-CM

## 2020-02-11 DIAGNOSIS — I48.92 ATRIAL FLUTTER, UNSPECIFIED TYPE (H): ICD-10-CM

## 2020-02-11 DIAGNOSIS — Z86.79 HX OF ATRIAL FLUTTER: ICD-10-CM

## 2020-02-11 PROCEDURE — 99204 OFFICE O/P NEW MOD 45 MIN: CPT | Performed by: INTERNAL MEDICINE

## 2020-02-11 ASSESSMENT — MIFFLIN-ST. JEOR: SCORE: 1168.07

## 2020-02-11 NOTE — PROGRESS NOTES
[facility]   Outpatient Sleep Medicine Consultation  February 11, 2020      Name: Gaby Aguilar MRN# [id number]   Age: 59 year old YOB: 1960     Date of Consultation: February 11, 2020  Consultation is requested by: David Doyle MD  [address] [facility]  [place] [address] David Doyle  Primary care provider: Reshma Enriquez         Reason for Sleep Consult:     Gaby Aguilar is a 59 year old female who would like to be evaluated for possible sleep apnea in the setting of new onset atrial fibrillation with observation of recurrent episodes at night during home monitoring.         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               History of Present Illness:     Gaby Aguilar is a 59 year old female who has had new-onset atrial fibrillation with recurrent limited events noted at night on home monitoring.  She has light snoring on an occasional basis, no observed apneas, no daytime sleepiness no underlying hypertension and is nonobese.  On the basis of age alone her risk of obstructive sleep apnea may be 20-30% though the implications of untreated sleep apnea may be more significant in the setting of recurrent atrial dysrhythmias.  On the basis of this decision analysis we have decided to proceed with home sleep testing based on payer coverage and patient preference.      This patient is  recently experienced difficulty initiating sleep related to a 4-month illness of her  requiring percutaneous gastrostomy and home cares.  She endorses excessive rumination and screen watching.      SCALES       SLEEP APNEA: Stopbang score  1       INSOMNIA:  Insomnia severity score: 9       SLEEPINESS: Muir sleepiness scale:3    SLEEP COMPLAINTS:  Cardio-respiratory    Snoring- 0/week  Dyspnea- denies  Morning headaches or confusion-denies  Coexisting Lung disease: denies    Coexisting Heart disease: denies    Does patient have a bed partner: denies  Has bed partner been sleeping separately because of snoring:  denies            RLS Screen: When you try to relax in the evening or sleep at  night, do you ever have unpleasant, restless feelings in your  legs that can be relieved by walking or movement? denies    Periodic limb movement: denies    Narcolepsy:  denies sudden urges of sleep attacks    Cataplexy:  denies     Sleep paralysis:  denies      Hallucinations:   denies       Sleep Behaviors:    Leg symptoms/movements: denies    Motor restlessness:denies    Night terrors: denies    Bruxism: denies    Automatic behaviors: none    Other subjective complaints:    Anxiety or rumination denies    Pain and discomfort at  night: denies    Waking up with heart pounding or racing: denies    GERD or aspiration:denies         Parasomnia:   NREM - denies recurrent persistent confusional arousal, night eating, sleep walking or sleep terrors   REM  - denies dream enactment; injuries              Medications:     Current Outpatient Medications   Medication Sig     acetaminophen (TYLENOL) 500 MG tablet Take 500-1,000 mg by mouth every 6 hours as needed for mild pain     biotin 1000 MCG TABS tablet Take 1,000 mcg by mouth daily     Calcium Carbonate-Vitamin D (CALCIUM + D) 600-200 MG-UNIT per tablet Take 2 tablets by mouth daily      ibuprofen (ADVIL/MOTRIN) 200 MG tablet Take 400 mg by mouth every 4 hours as needed for  mild pain     metoprolol succinate ER (TOPROL-XL) 25 MG 24 hr tablet Take 1 tablet (25 mg) by mouth 2 times daily     Multiple Vitamins-Calcium (ONE-A-DAY WOMENS FORMULA) TABS Take 1 tablet by mouth daily.     VITAMIN E NATURAL PO      oxyCODONE (ROXICODONE) 5 MG tablet Take 1-2 tablets (5-10 mg) by mouth every 3 hours as needed for pain (Moderate to Severe) (Patient not taking: Reported on 2019)     No current facility-administered medications for this visit.         Allergies   Allergen Reactions     Sulfa Drugs Hives            Past Medical History:     Does not need 02 supplement at night   Past Medical History:   Diagnosis Date     Allergic rhinitis 2011     Problem list name updated by automated process. Provider to review     Arthritis     hands     Diffuse cystic mastopathy 2012     Hx of atrial flutter 2018     Symptomatic menopausal or female climacteric states 2008             Past Surgical History:    Previous upper airway surgery    Past Surgical History:   Procedure Laterality Date     HC CORRECT BUNION,SIMPLE Right      HC TOOTH EXTRACTION W/FORCEP  age 20     LAPAROSCOPIC HYSTERECTOMY TOTAL, BILATERAL SALPINGO-OOPHORECTOMY, COMBINED Bilateral 3/6/2019    Procedure: Single port total laparoscopic hysterectomy bilateral salpingo-oopherectomy;  Surgeon: Laisha Kathleen MD;  Location:  OR            Social History:     Social History     Tobacco Use     Smoking status: Former Smoker     Packs/day: 0.50     Years: 6.00     Pack years: 3.00     Last attempt to quit: 3/26/1982     Years since quittin.9     Smokeless tobacco: Never Used   Substance Use Topics     Alcohol use: Yes     Alcohol/week: 7.0 standard drinks     Types: 7 Standard drinks or equivalent per week     Comment: 4-5 drinks week         1 to 2 glasses of wine approximately 3 to 4 hours before bedtime         Family History:     Family History   Problem Relation Age of Onset     Cancer Mother          presacral cancer     Hypertension Mother      Hypertension Father      Prostate Cancer Father      Diabetes Maternal Grandmother      Breast Cancer Maternal Aunt      C.A.D. No family hx of      Cerebrovascular Disease No family hx of      Cancer - colorectal No family hx of                                                         Review of Systems:     A complete 10 point review of systems was negative other than HPI or as commented below:   Irregular heartbeats  Anxiety         Physical Examination:     Howe Total Score 2/11/2020   Total score - Howe 1       Constitutional: . Awake, alert, cooperative, dressed casually, good eye contact, comfortably sitting in a chair, in no apparent distress  Mood: euthymic; affect congruent with full range and intensity.  Attention/Concentration:  Normal   Eyes: No icterus.  ENT: Mallampati Class:II  Cardiovascular: Regular S1 and S2, no gallops or murmurs. No carotid bruits  Neck: Supple, no thyroid enlargement.   Pulmonary:  Chest symmetric, lungs clear bilaterally and no crackles, wheezes or rales  Extremities:  No pedal edema.  Muscle/joint: Strength and tone normal   Skin:  No rash or significant lesions.   Gait Normal.  Neurologic: Alert, oriented x3, no focal neurological deficit, cranial nerves grossly normal            Data: All pertinent previous laboratory data reviewed     Lab Results   Component Value Date    PH 6.0 12/01/2006     Lab Results   Component Value Date    TSH 1.58 12/07/2018    TSH 2.890 11/09/2016     Lab Results   Component Value Date     (H) 12/07/2018    GLC 91 11/09/2016     Lab Results   Component Value Date    HGB 14.1 03/06/2019    HGB 14.2 12/07/2018     Lab Results   Component Value Date    BUN 16 12/07/2018    BUN 12 11/09/2016    CR 0.73 03/06/2019    CR 0.70 12/07/2018     Lab Results   Component Value Date    AST 15 11/09/2016    AST 19 05/27/2010    ALT 16 11/09/2016    ALT 14 05/27/2010    ALKPHOS 62 11/09/2016    ALKPHOS 75  05/27/2010    BILITOTAL 0.7 11/09/2016    BILITOTAL 0.9 05/27/2010     No results found for: UAARELI, UBRHONA, JORGE, UCEUSEBIO, UCOC, OPIT, UPCP        Copy to: Reshma Enriquez MD 2/11/2020     Total time spent with patient: 45 min >50% counseling

## 2020-02-11 NOTE — PATIENT INSTRUCTIONS
"MY TREATMENT INFORMATION FOR SLEEP APNEA-  Gaby Aguilar    DOCTOR : MARY KIM MD  SLEEP CENTER :      MY CONTACT NUMBER:     Am I having a sleep study at a sleep center?  Make sure you have an appointment for the study before you leave!    Am I having a home sleep study?  Watch this video:  [email or web address]/[email or web address]?v=[email or web address]  Please verify your insurance coverage with your insurance carrier  We would like to share some general information about sleep to help us work together to get better sleep for you.     Why do we sleep?   -clear toxins from the brain   -remove unnecessary neural connections that accumulate during the day   -enhance memory and learning   Without adequate sleep, our brain may become impaired in a manner that is similar to being intoxicated.       How much sleep do we need?   -The average adult needs 7-8 hours of sleep while young adolescents need up to 9 hours in order to function at their best.   -Between 12 and 20 years of age our sleep is often delayed up to an hour compared to older or younger people.   Aim for 7-8 sleep and a regular schedule; it may take a week or more to eliminate the effects of chronic insufficient sleep.       When should I sleep?   The timing of sleep is determined genetically for each of us.  Some of us are \"night owls\" and others are \"larks\".   The timing of sleep and the amount of sleep we need changes with our age.   Try to schedule your sleep time to fit your natural sleep schedule when you are not busy with school, work, or travel.       What if my sleep schedule doesn't fit my work schedule?   -If you have no trouble falling asleep or getting up during the work week, your work schedule is probably well-matched to your natural sleep schedule.   -You may benefit from a sleep  who can help support you to get to the best schedule.       What are some good habits to start with?   -Your bedroom is for sleeping and should be quiet, comfortably cool " "and dark before you lie down.   -You should not have electronic devices such as phones or computers in your bedroom.   -Your bedtime and awakening time should fit your natural tendency to fall asleep and wake up and should not vary much between weekdays and weekends.   -No caffeine within 6 hours or alcohol within two hours of bedtime  Be careful and regular with your sleep-you will feel better and think better.    Frequently asked questions:  1. What is Obstructive Sleep Apnea (VINAY)? VINAY is the most common type of sleep apnea. Apnea means, \"without breath.\"  Apnea is most often caused by narrowing or collapse of the upper airway as muscles relax during sleep.   Almost everyone has occasional apneas. Most people with sleep apnea have had brief interruptions at night frequently for many years.  The severity of sleep apnea is related to how frequent and severe the events are.   2. What are the consequences of VINAY? Symptoms include: feeling sleepy during the day, snoring loudly, gasping or stopping of breathing, trouble sleeping, and occasionally morning headaches or heartburn at night.  Sleepiness can be serious and even increase the risk of falling asleep while driving. Other health consequences may include development of high blood pressure and other cardiovascular disease in persons who are susceptible. Untreated VINAY  can contribute to heart disease, stroke and diabetes.   3. What are the treatment options? In most situations, sleep apnea is a lifelong disease that must be managed with daily therapy. Medications are not effective for sleep apnea and surgery is generally not considered until other therapies have been tried. Your treatment is your choice . Continuous Positive Airway (CPAP) works right away and is the therapy that is effective in nearly everyone. An oral device to hold your jaw forward is usually the next most reliable option. Other options include postioning devices (to keep you off your back), " weight loss, and surgery including a tongue pacing device. There is more detail about some of these options below.    Important tips for using CPAP and similar devices   Know your equipment:  CPAP is continuous positive airway pressure that prevents obstructive sleep apnea by keeping the throat from collapsing while you are sleeping. In most cases, the device is  smart  and can slowly self-adjusts if your throat collapses and keeps a record every day of how well you are treated-this information is available to you and your care team.  BPAP is bilevel positive airway pressure that keeps your throat open and also assists each breath with a pressure boost to maintain adequate breathing.  Special kinds of BPAP are used in patients who have inadequate breathing from lung or heart disease. In most cases, the device is  smart  and can slowly self-adjusts to assist breathing. Like CPAP, the device keeps a record of how well you are treated.  Your mask is your connection to the device. You get to choose what feels most comfortable and the staff will help to make sure if fits. Here: are some examples of the different masks that are available:       Key points to remember on your journey with sleep apnea:  1. Sleep study.  PAP devices often need to be adjusted during a sleep study to show that they are effective and adjusted right.  2. Good tips to remember: Try wearing just the mask during a quiet time during the day so your body adapts to wearing it. A humidifier is recommended for comfort in most cases to prevent drying of your nose and throat. Allergy medication from your provider may help you if you are having nasal congestion.  3. Getting settled-in. It takes more than one night for most of us to get used to wearing a mask. Try wearing just the mask during a quiet time during the day so your body adapts to wearing it. A humidifier is recommended for comfort in most cases. Our team will work with you carefully on the  first day and will be in contact within 4 days and again at 2 and 4 weeks for advice and remote device adjustments. Your therapy is evaluated by the device each day.   4. Use it every night. The more you are able to sleep naturally for 7-8 hours, the more likely you will have good sleep and to prevent health risks or symptoms from sleep apnea. Even if you use it 4 hours it helps. Occasionally all of us are unable to use a medical therapy, in sleep apnea, it is not dangerous to miss one night.   5. Communicate. Call our skilled team on the number provided on the first day if your visit for problems that make it difficult to wear the device. Over 2 out of 3 patients can learn to wear the device long-term with help from our team. Remember to call our team or your sleep providers if you are unable to wear the device as we may have other solutions for those who cannot adapt to mask CPAP therapy. It is recommended that you sleep your sleep provider within the first 3 months and yearly after that if you are not having problems.   6. Use it for your health. We encourage use of CPAP masks during daytime quiet periods to allow your face and brain to adapt to the sensation of CPAP so that it will be a more natural sensation to awaken to at night or during naps. This can be very useful during the first few weeks or months of adapting to CPAP though it does not help medically to wear CPAP during wakefulness and  should not be used as a strategy just to meet guidelines.  7. Take care of your equipment. Make sure you clean your mask and tubing using directions every day and that your filter and mask are replaced as recommended or if they are not working.     BESIDES CPAP, WHAT OTHER THERAPIES ARE THERE?    Positioning Device  Positioning devices are generally used when sleep apnea is mild and only occurs on your back.This example shows a pillow that straps around the waist. It may be appropriate for those whose sleep study shows  milder sleep apnea that occurs primarily when lying flat on one's back. Preliminary studies have shown benefit but effectiveness at home may need to be verified by a home sleep test. These devices are generally not covered by medical insurance.  Examples of devices that maintain sleeping on the back to prevent snoring and mild sleep apnea.    Belt type body positioner  Http://utoopia[employer]/    Electronic reminder  Http://nightshifttherapy.com/  Http://www.[employer].au/      Oral Appliance  What is oral appliance therapy?  An oral appliance device fits on your teeth at night like a retainer used after having braces. The device is made by a specialized dentist and requires several visits over 1-2 months before a manufactured device is made to fit your teeth and is adjusted to prevent your sleep apnea. Once an oral device is working properly, snoring should be improved. A home sleep test may be recommended at that time if to determine whether the sleep apnea is adequately treated.       Some things to remember:  -Oral devices are often, but not always, covered by your medical insurance. Be sure to check with your insurance provider.   -If you are referred for oral therapy, you will be given a list of specialized dentists to consider or you may choose to visit the Web site of the American Academy of Dental Sleep Medicine  -Oral devices are less likely to work if you have severe sleep apnea or are extremely overweight.     More detailed information  An oral appliance is a small acrylic device that fits over the upper and lower teeth  (similar to a retainer or a mouth guard). This device slightly moves jaw forward, which moves the base of the tongue forward, opens the airway, improves breathing for effective treat snoring and obstructive sleep apnea in perhaps 7 out of 10 people .  The best working devices are custom-made by a dental device  after a mold is made of the teeth 1, 2, 3.  When is an oral  appliance indicated?  Oral appliance therapy is recommended as a first-line treatment for patients with primary snoring, mild sleep apnea, and for patients with moderate sleep apnea who prefer appliance therapy to use of CPAP4, 5. Severity of sleep apnea is determined by sleep testing and is based on the number of respiratory events per hour of sleep.   How successful is oral appliance therapy?  The success rate of oral appliance therapy in patients with mild sleep apnea is 75-80% while in patients with moderate sleep apnea it is 50-70%. The chance of success in patients with severe sleep apnea is 40-50%. The research also shows that oral appliances have a beneficial effect on the cardiovascular health of VINAY patients at the same magnitude as CPAP therapy7.  Oral appliances should be a second-line treatment in cases of severe sleep apnea, but if not completely successful then a combination therapy utilizing CPAP plus oral appliance therapy may be effective. Oral appliances tend to be effective in a broad range of patients although studies show that the patients who have the highest success are females, younger patients, those with milder disease, and less severe obesity. 3, 6.   Finding a dentist that practices dental sleep medicine  Specific training is available through the American Academy of Dental Sleep Medicine for dentists interested in working in the field of sleep. To find a dentist who is educated in the field of sleep and the use of oral appliances, near you, visit the Web site of the American Academy of Dental Sleep Medicine.    References  1. Tay et al. Objectively measured vs self-reported compliance during oral appliance therapy for sleep-disordered breathing. Chest 2013; 144(5): 8120-2947.  2. Carlos et al. Objective measurement of compliance during oral appliance therapy for sleep-disordered breathing. Thorax 2013; 68(1): 91-96.  3. Gary et al. Mandibular advancement devices in  620 men and women with VINAY and snoring: tolerability and predictors of treatment success. Chest 2004; 125: 5838-5466.  4. Riri et al. Oral appliances for snoring and VINAY: a review. Sleep 2006; 29: 244-262.  5. Janes et al. Oral appliance treatment for VINAY: an update. J Clin Sleep Med 2014; 10(2): 215-227.  6. Kory et al. Predictors of OSAH treatment outcome. J Dent Res 2007; 86: 1803-0452.      Weight Loss:    Weight loss is a long-term strategy that may improve sleep apnea in some patients.    Weight management is a personal decision and the decision should be based on your interest and the potential benefits.  If you are interested in exploring weight loss strategies, the following discussion covers the impact on weight loss on sleep apnea and the approaches that may be successful.    Being overweight does not necessarily mean you will have health consequences.  Those who have BMI over 35 or over 27 with existing medical conditions carries greater risk.   Weight loss decreases severity of sleep apnea in most people with obesity. For those with mild obesity who have developed snoring with weight gain, even 15-30 pound weight loss can improve and occasionally eliminate sleep apnea.  Structured and life-long dietary and health habits are necessary to lose weight and keep healthier weight levels.     Though there may be significant health benefits from weight loss, long-term weight loss is very difficult to achieve- studies show success with dietary management in less than 10% of people. In addition, substantial weight loss may require years of dietary control and may be difficult if patients have severe obesity. In these cases, surgical management may be considered.  Finally, older individuals who have tolerated obesity without health complications may be less likely to benefit from weight loss strategies.        Your BMI is Body mass index is 22.99 kg/m .  Weight management is a personal decision.  If  you are interested in exploring weight loss strategies, the following discussion covers the approaches that may be successful. Body mass index (BMI) is one way to tell whether you are at a healthy weight, overweight, or obese. It measures your weight in relation to your height.  A BMI of 18.5 to 24.9 is in the healthy range. A person with a BMI of 25 to 29.9 is considered overweight, and someone with a BMI of 30 or greater is considered obese. More than two-thirds of American adults are considered overweight or obese.  Being overweight or obese increases the risk for further weight gain. Excess weight may lead to heart disease and diabetes.  Creating and following plans for healthy eating and physical activity may help you improve your health.  Weight control is part of healthy lifestyle and includes exercise, emotional health, and healthy eating habits. Careful eating habits lifelong are the mainstay of weight control. Though there are significant health benefits from weight loss, long-term weight loss with diet alone may be very difficult to achieve- studies show long-term success with dietary management in less than 10% of people. Attaining a healthy weight may be especially difficult to achieve in those with severe obesity. In some cases, medications, devices and surgical management might be considered.  What can you do?  If you are overweight or obese and are interested in methods for weight loss, you should discuss this with your provider.     Consider reducing daily calorie intake by 500 calories.     Keep a food journal.     Avoiding skipping meals, consider cutting portions instead.    Diet combined with exercise helps maintain muscle while optimizing fat loss. Strength training is particularly important for building and maintaining muscle mass. Exercise helps reduce stress, increase energy, and improves fitness. Increasing exercise without diet control, however, may not burn enough calories to loose  weight.       Start walking three days a week 10-20 minutes at a time    Work towards walking thirty minutes five days a week     Eventually, increase the speed of your walking for 1-2 minutes at time    In addition, we recommend that you review healthy lifestyles and methods for weight loss available through the National Institutes of Health patient information sites:  http://win.niddk.nih.gov/publications/index.htm    And look into health and wellness programs that may be available through your health insurance provider, employer, local community center, or [employer].          Surgery:    Surgery for obstructive sleep apnea is considered generally only when other therapies fail to work. Surgery may be discussed with you if you are having a difficult time tolerating CPAP and or when there is an abnormal structure that requires surgical correction.  Nose and throat surgeries often enlarge the airway to prevent collapse.  Most of these surgeries create pain for 1-2 weeks and up to half of the most common surgeries are not effective throughout life.  You should carefully discuss the benefits and drawbacks to surgery with your sleep provider and surgeon to determine if it is the best solution for you.   More information  Surgery for VINAY is directed at areas that are responsible for narrowing or complete obstruction of the airway during sleep.  There are a wide range of procedures available to enlarge and/or stabilize the airway to prevent blockage of breathing in the three major areas where it can occur: the palate, tongue, and nasal regions.  Successful surgical treatment depends on the accurate identification of the factors responsible for obstructive sleep apnea in each person.  A personalized approach is required because there is no single treatment that works well for everyone.  Because of anatomic variation, consultation with an examination by a sleep surgeon is a critical first step in determining what surgical  options are best for each patient.  In some cases, examination during sedation may be recommended in order to guide the selection of procedures.  Patients will be counseled about risks and benefits as well as the typical recovery course after surgery. Surgery is typically not a cure for a person s VINAY.  However, surgery will often significantly improve one s VINAY severity (termed  success rate ).  Even in the absence of a cure, surgery will decrease the cardiovascular risk associated with OSA7; improve overall quality of life8 (sleepiness, functionality, sleep quality, etc).      Palate Procedures:  Patients with VINAY often have narrowing of their airway in the region of their tonsils and uvula.  The goals of palate procedures are to widen the airway in this region as well as to help the tissues resist collapse.  Modern palate procedure techniques focus on tissue conservation and soft tissue rearrangement, rather than tissue removal.  Often the uvula is preserved in this procedure. Residual sleep apnea is common in patient after pharyngoplasty with an average reduction in sleep apnea events of 33%2.      Tongue Procedures:  ExamWhile patients are awake, the muscles that surround the throat are active and keep this region open for breathing. These muscles relax during sleep, allowing the tongue and other structures to collapse and block breathing.  There are several different tongue procedures available.  Selection of a tongue base procedure depends on characteristics seen on physical exam.  Generally, procedures are aimed at removing bulky tissues in this area or preventing the back of the tongue from falling back during sleep.  Success rates for tongue surgery range from 50-62%3.    Hypoglossal Nerve Stimulation:  Hypoglossal nerve stimulation has recently received approval from the [place] Food and Drug Administration for the treatment of obstructive sleep apnea.  This is based on research showing that the  system was safe and effective in treating sleep apnea6.  Results showed that the median AHI score decreased 68%, from 29.3 to 9.0. This therapy uses an implant system that senses breathing patterns and delivers mild stimulation to airway muscles, which keeps the airway open during sleep.  The system consists of three fully implanted components: a small generator (similar in size to a pacemaker), a breathing sensor, and a stimulation lead.  Using a small handheld remote, a patient turns the therapy on before bed and off upon awakening.    Candidates for this device must be greater than 22 years of age, have moderate to severe VINAY (AHI between 20-65), BMI less than 32, have tried CPAP/oral appliance without success, and have appropriate upper airway anatomy (determined by a sleep endoscopy performed by Dr. Lott).    Hypoglossal Nerve Stimulation Pathway:    The sleep surgeon s office will work with the patient through the insurance prior-authorization process (including communications and appeals).    Nasal Procedures:  Nasal obstruction can interfere with nasal breathing during the day and night.  Studies have shown that relief of nasal obstruction can improve the ability of some patients to tolerate positive airway pressure therapy for obstructive sleep apnea1.  Treatment options include medications such as nasal saline, topical corticosteroid and antihistamine sprays, and oral medications such as antihistamines or decongestants. Non-surgical treatments can include external nasal dilators for selected patients. If these are not successful by themselves, surgery can improve the nasal airway either alone or in combination with these other options.      Combination Procedures:  Combination of surgical procedures and other treatments may be recommended, particularly if patients have more than one area of narrowing or persistent positional disease.  The success rate of combination surgery ranges from 66-80%2,3.     References  1. Nirmal ENCARNACION. The Role of the Nose in Snoring and Obstructive Sleep Apnoea: An Update.  Eur Arch Otorhinolaryngol. 2011; 268: 1365-73.  2.  Ashwini SM; Merrill JA; Baldemar JR; Pallanch JF; hSreya MB; Maru SG; Lali ELLIOTT. Surgical modifications of the upper airway for obstructive sleep apnea in adults: a systematic review and meta-analysis. SLEEP 2010;33(10):5404-0919. Kika MORGAN. Hypopharyngeal surgery in obstructive sleep apnea: an evidence-based medicine review.  Arch Otolaryngol Head Neck Surg. 2006 Feb;132(2):206-13.  3. Harinder YH1, Amy Y, Frederick DEVIN. The efficacy of anatomically based multilevel surgery for obstructive sleep apnea. Otolaryngol Head Neck Surg. 2003 Oct;129(4):327-35.  4. Kika MORGAN, Goldberg A. Hypopharyngeal Surgery in Obstructive Sleep Apnea: An Evidence-Based Medicine Review. Arch Otolaryngol Head Neck Surg. 2006 Feb;132(2):206-13.  5. Reshma PJ et al. Upper-Airway Stimulation for Obstructive Sleep Apnea.  N Engl J Med. 2014 Jan 9;370(2):139-49.  6. Alessandro Y et al. Increased Incidence of Cardiovascular Disease in Middle-aged Men with Obstructive Sleep Apnea. Am J Respir Crit Care Med; 2002 166: 159-165  7. Davis EM et al. Studying Life Effects and Effectiveness of Palatopharyngoplasty (SLEEP) study: Subjective Outcomes of Isolated Uvulopalatopharyngoplasty. Otolaryngol Head Neck Surg. 2011; 144: 623-631.          Your blood pressure was checked while you were in clinic today.  Please read the guidelines below about what these numbers mean and what you should do about them.  Your systolic blood pressure is the top number.  This is the pressure when the heart is pumping.  Your diastolic blood pressure is the bottom number.  This is the pressure in between beats.  If your systolic blood pressure is less than 120 and your diastolic blood pressure is less than 80, then your blood pressure is normal. There is nothing more that you need to do about it  If your systolic blood pressure  is 120-139 or your diastolic blood pressure is 80-89, your blood pressure may be higher than it should be.  You should have your blood pressure re-checked within a year by a primary care provider.  If your systolic blood pressure is 140 or greater or your diastolic blood pressure is 90 or greater, you may have high blood pressure.  High blood pressure is treatable, but if left untreated over time it can put you at risk for heart attack, stroke, or kidney failure.  You should have your blood pressure re-checked by a primary care provider within the next four weeks.  Your BMI is There is no height or weight on file to calculate BMI.  Weight management is a personal decision.  If you are interested in exploring weight loss strategies, the following discussion covers the approaches that may be successful. Body mass index (BMI) is one way to tell whether you are at a healthy weight, overweight, or obese. It measures your weight in relation to your height.  A BMI of 18.5 to 24.9 is in the healthy range. A person with a BMI of 25 to 29.9 is considered overweight, and someone with a BMI of 30 or greater is considered obese. More than two-thirds of American adults are considered overweight or obese.  Being overweight or obese increases the risk for further weight gain. Excess weight may lead to heart disease and diabetes.  Creating and following plans for healthy eating and physical activity may help you improve your health.  Weight control is part of healthy lifestyle and includes exercise, emotional health, and healthy eating habits. Careful eating habits lifelong are the mainstay of weight control. Though there are significant health benefits from weight loss, long-term weight loss with diet alone may be very difficult to achieve- studies show long-term success with dietary management in less than 10% of people. Attaining a healthy weight may be especially difficult to achieve in those with severe obesity. In some cases,  medications, devices and surgical management might be considered.  What can you do?  If you are overweight or obese and are interested in methods for weight loss, you should discuss this with your provider.     Consider reducing daily calorie intake by 500 calories.     Keep a food journal.     Avoiding skipping meals, consider cutting portions instead.    Diet combined with exercise helps maintain muscle while optimizing fat loss. Strength training is particularly important for building and maintaining muscle mass. Exercise helps reduce stress, increase energy, and improves fitness. Increasing exercise without diet control, however, may not burn enough calories to loose weight.       Start walking three days a week 10-20 minutes at a time    Work towards walking thirty minutes five days a week     Eventually, increase the speed of your walking for 1-2 minutes at time    In addition, we recommend that you review healthy lifestyles and methods for weight loss available through the National Institutes of Health patient information sites:  http://win.niddk.nih.gov/publications/index.htm    And look into health and wellness programs that may be available through your health insurance provider, employer, local community center, or [employer].

## 2020-02-26 ENCOUNTER — OFFICE VISIT (OUTPATIENT)
Dept: SLEEP MEDICINE | Facility: CLINIC | Age: 60
End: 2020-02-26
Payer: COMMERCIAL

## 2020-02-26 DIAGNOSIS — G47.33 OSA (OBSTRUCTIVE SLEEP APNEA): ICD-10-CM

## 2020-02-26 PROCEDURE — G0399 HOME SLEEP TEST/TYPE 3 PORTA: HCPCS | Performed by: INTERNAL MEDICINE

## 2020-02-27 ENCOUNTER — DOCUMENTATION ONLY (OUTPATIENT)
Dept: SLEEP MEDICINE | Facility: CLINIC | Age: 60
End: 2020-02-27
Payer: COMMERCIAL

## 2020-02-27 NOTE — PROGRESS NOTES
This HSAT was performed using a Noxturnal T3 device which recorded snore, sound, movement activity, body position, nasal pressure, oronasal thermal airflow, pulse, oximetry and both chest and abdominal respiratory effort. HSAT data was restricted to the time patient states they were in bed.     HSAT was scored using 1B 4% hypopnea rule.     AHI: 2.1. Snoring was reported as mild.  Time with SpO2 below 89% was 0 minutes.   Overall signal quality was good     Pt will follow up with sleep provider to determine appropriate therapy.     Ordering MD, Farooq, MD Chaka Terrazas, [facility], RST System Clinical Specialist 02/27/2020

## 2020-03-10 ENCOUNTER — TELEPHONE (OUTPATIENT)
Dept: SLEEP MEDICINE | Facility: CLINIC | Age: 60
End: 2020-03-10

## 2020-03-10 NOTE — PROCEDURES
"HOME SLEEP STUDY INTERPRETATION    Patient: Gaby Aguilar  MRN: [id number]  YOB: 1960  Study Date: 2020  Referring Provider: Reshma Enriquez;   Ordering Provider: Mk Trivedi MD     Indications for Home Study: Gaby Aguilar is a 59 year old female with a history of atrial fibrillation who presents with symptoms suggestive of obstructive sleep apnea.    Estimated body mass index is 22.99 kg/m  as calculated from the following:    Height as of 20: 1.626 m (5' 4.02\").    Weight as of 20: 60.8 kg (134 lb).  Total score - [place]: 1 (2020  8:39 AM)  STOP-BAN/8    Data: A full night home sleep study was performed recording the standard physiologic parameters including body position, movement, sound, nasal pressure, thermal oral airflow, chest and abdominal movements with respiratory inductance plethysmography, and oxygen saturation by pulse oximetry. Pulse rate was estimated by oximetry recording. This study was considered adequate based on > 4 hours of quality oximetry and respiratory recording. As specified by the AASM Manual for the Scoring of Sleep and Associated events, version 2.3, Rule VIII.D 1B, 4% oxygen desaturation scoring for hypopneas is used as a standard of care on all home sleep apnea testing.    Analysis Time:  432 minutes    Respiration:   Sleep Associated Hypoxemia: sustained hypoxemia was not present. Baseline oxygen saturation was 96%.  Time with saturation less than or equal to 88% was 0 minutes. The lowest oxygen saturation was 90%.   Snoring: Snoring was present.  Respiratory events: The home study revealed a presence of 2 obstructive apneas and 4 mixed and central apneas. There were 9 hypopneas resulting in a combined apnea/hypopnea index [AHI] of 1.7 events per hour.  AHI was 7 per hour supine, - per hour prone, 2 per hour on left side, and 2 per hour on right side.   Pattern: Excluding events noted above, respiratory rate and pattern was " Normal.    Position: Percent of time spent: supine - 6%, prone - 0%, on left - 34%, on right - 59%.    Heart Rate: By pulse oximetry normal rate was noted.     Assessment:   Light snoring without significant obstructive sleep apnea.  Sleep associated hypoxemia was not present.    Recommendations:   There is no evidence of significant sleep disordered breathing that would offer cardiovascular risk..  Socially unacceptable snoring may be managed with treatment of nasal conditions and lateral positioning.    Suggest optimizing sleep hygiene and avoiding sleep deprivation.      Diagnosis Code(s): Snoring R06.83    MARY KIM MD, March 10, 2020   Diplomate, American Board of Internal Medicine, Sleep Medicine

## 2020-03-10 NOTE — TELEPHONE ENCOUNTER
Gaby JIMENEZ asking for results of her HST done on 2/26/2020, be added to her mychart.  Route to Dr. Trivedi for review.

## 2020-09-15 ENCOUNTER — TELEPHONE (OUTPATIENT)
Dept: CARDIOLOGY | Facility: CLINIC | Age: 60
End: 2020-09-15

## 2020-09-15 DIAGNOSIS — Z86.79 HX OF ATRIAL FLUTTER: ICD-10-CM

## 2020-09-17 RX ORDER — METOPROLOL SUCCINATE 25 MG/1
25 TABLET, EXTENDED RELEASE ORAL 2 TIMES DAILY
Qty: 180 TABLET | Refills: 0 | Status: SHIPPED | OUTPATIENT
Start: 2020-09-17 | End: 2020-11-18

## 2020-09-17 NOTE — TELEPHONE ENCOUNTER
metoprolol succinate ER (TOPROL-XL) 25 MG 24 hr tablet   Last Written Prescription Date:  11/20/2019  Last Fill Quantity: 180,   # refills: 2  Last Office Visit : 11/20/2019 Dr Doyle  Future Office visit:  Repeat ZIO Patch monitor in 6 months with followup when results are ready, earlier if needed.      Routing refill request to provider for review because:  Appt and ziopatch due. 90 RF sent per protocol.

## 2020-11-16 ENCOUNTER — DOCUMENTATION ONLY (OUTPATIENT)
Dept: CARE COORDINATION | Facility: CLINIC | Age: 60
End: 2020-11-16

## 2020-11-18 ENCOUNTER — VIRTUAL VISIT (OUTPATIENT)
Dept: CARDIOLOGY | Facility: CLINIC | Age: 60
End: 2020-11-18
Attending: INTERNAL MEDICINE
Payer: COMMERCIAL

## 2020-11-18 DIAGNOSIS — E78.5 HYPERLIPIDEMIA LDL GOAL <100: Primary | ICD-10-CM

## 2020-11-18 DIAGNOSIS — I48.92 ATRIAL FLUTTER, UNSPECIFIED TYPE (H): ICD-10-CM

## 2020-11-18 DIAGNOSIS — Z86.79 HX OF ATRIAL FLUTTER: ICD-10-CM

## 2020-11-18 PROCEDURE — 99213 OFFICE O/P EST LOW 20 MIN: CPT | Mod: 95 | Performed by: INTERNAL MEDICINE

## 2020-11-18 RX ORDER — METOPROLOL SUCCINATE 25 MG/1
TABLET, EXTENDED RELEASE ORAL
Qty: 360 TABLET | Refills: 3 | Status: SHIPPED | OUTPATIENT
Start: 2020-11-18 | End: 2022-01-27

## 2020-11-18 NOTE — PROGRESS NOTES
Service Date: 2020      Reshma Enriquez MD   [facility]     [address] [place] [address]      RE: Gaby Aguilar    MRN: [id number]   : 1960      Dear Dr. Enriquez:       It was a pleasure participating in the care of your patient Ms. Gaby Aguilar.  As you know, she is a 60-year-old lady whom I saw today over a virtual video visit via [employer] for paroxysmal AFib.         PAST MEDICAL HISTORY:      1.  Hyperlipidemia.   2.  Allergic rhinitis.   3.  Amenorrhea.   4.  Hysterectomy.      Her cardiac history is significant for documented paroxysmal atrial fibrillation/atrial flutter.  This was confirmed on a Zio patch monitor in 2018, which confirmed that her overall AFib burden was approximately 5%.      Her echocardiogram 2019 revealed a structurally normal heart.  CHADS-VASc2 score is 1 for female gender only.      I last saw her 2019.  At that time she was doing adequately.  She presents today for continuing care.      Since our last visit, we had increased her metoprolol to 25 mg twice daily, and she has not really felt much in terms of side effects, but she is also unsure if there has been any improvement in terms of number of palpitations.  She feels palpitations perhaps twice a week for about 2 minutes and feels her heart racing, but briefly, but she does not feel dizzy or lightheaded from this.  She just takes some deep breaths, and it goes away.  Her sleep study was negative on 2020.      She otherwise denies any significant chest pain, shortness of breath, PND, orthopnea, edema, syncope or near syncope.      REVIEW OF SYSTEMS:  A 10 point review of systems is otherwise unremarkable.      CURRENT MEDICATIONS:  Metoprolol succinate 25 twice daily.      PHYSICAL EXAMINATION:     VITAL SIGNS:  Her blood pressure today is 115/66 with a pulse of 55.   GENERAL:  She appears comfortable and well groomed.   PSYCHIATRIC:  She is alert and  oriented x3.   EYES:  Her eyes do not appear grossly erythematous or have exudate.   RESPIRATORY:  She is breathing comfortably without gross cough.      The remainder of the comprehensive physical exam was deferred secondary to the COVID-19 pandemic and secondary to video visit restrictions.      LABORATORY:  On 03/06/2019, GFR normal.        IMAGING:  Echocardiogram 01/09/2019 reveals an ejection fraction of 60%-65% without gross valvular pathology.      Zio patch monitor 10/04/2019 revealed overall AFib burden of 5%.  Longest episode was 1 hour 28 minutes with pauses during sleeping hours.  Symptoms correlate with AFib and rapid rate of 143 beats per minute.        IMPRESSION:      Gaby is a 60-year-old lady whose cardiac history is significant for documented paroxysmal atrial fibrillation/atrial flutter.  Her CHADS-VASc2 score continues to be 1 for female gender only.      Echocardiogram 01/09/2019 revealed a structurally normal heart.  Despite increasing her Toprol to 25 mg twice daily, she continues to feel brief episodes of rapid palpitations perhaps twice a week.      She is currently not anticoagulated, as her current thromboembolic risk is approximately 1% per year.  We will attempt to medically manage and monitor her progress.        PLAN:     1.  Increase Toprol to 37.5 mg twice daily.     2.  Repeat 14 day Zio patch monitor in about 5 months.     3.  Virtual video visit followup appointment in 6 months with lab work prior, earlier if needed.      Once again, it was a pleasure participating in the care of your patient Ms. Gaby Aguilar.  Please feel free to contact me at any time if you have any questions regarding her care in the future.      Sincerely,            NANCY MORIN MD        Addendum 5/17/21:    Ziopatch monitor results reviewed    Atrial flutter 3% burden, longest 2 hours 3min up to 145bpm    Plan:    1.  Will discuss monitor results with patient, scheduled virtual appt Wed May 26th at  11am, with echo prior please help facilitate           D: 2020   T: 2020   MT: judy      Name:     JOSÉ MANUEL JAIMES   MRN:      [id number]        Account:      [id number]   :      1960           Service Date: 2020      Document: [id number]

## 2020-11-18 NOTE — PROGRESS NOTES
"Gaby Aguilar is a 60 year old female who is being evaluated via a billable video visit.      The patient has been notified of following:     \"This video visit will be conducted via a call between you and your physician/provider. We have found that certain health care needs can be provided without the need for an in-person physical exam.  This service lets us provide the care you need with a video conversation.  If a prescription is necessary we can send it directly to your pharmacy.  If lab work is needed we can place an order for that and you can then stop by our lab to have the test done at a later time.    Video visits are billed at different rates depending on your insurance coverage.  Please reach out to your insurance provider with any questions.    If during the course of the call the physician/provider feels a video visit is not appropriate, you will not be charged for this service.\"    Patient has given verbal consent for Video visit? Yes  How would you like to obtain your AVS? MyChart  If you are dropped from the video visit, the video invite should be resent to: Text to cell phone: [phone] [employer]ximity  Will anyone else be joining your video visit? No    Vitals - Patient Reported  Pain Score: No Pain (0)    Video-Visit Details    Type of service:  Video Visit    Video Start Time:106pm    Video End Time:120pm    Originating Location (pt. Location):patient home      Distant Location (provider location):  home office    Platform [facility] for Video Visit: [facility]    See dictation #[id number]    "

## 2020-11-18 NOTE — PATIENT INSTRUCTIONS
Patient Instructions:  It was a pleasure to see you in the cardiology clinic today.      If you have any questions, you can reach my nurse, Ivory MACKEY LPN, at [phone].  Press Option #1 for the [facility], and then press Option #4 for nursing.    We are encouraging the use of [employer] to communicate with your HealthCare Provider    Medication Changes:  Increase Toprol to 37.5 mg twice daily.    Recommendations: Complete fasting labs in 6 months prior to seeing Dr Doyle.    Studies Ordered: 14 day cardiac event monitor in 5 months.    The results from today include: None.    Please follow up: With Dr. Doyle in 6 months.    Sincerely,    David Doyle MD     If you have an urgent need after hours (8:00 am to 4:30 pm) please call [phone] and ask for the cardiology fellow on call.        1.  Increase Toprol to 37.5mg po BID  2.  14d Ziopatch monitor 5 months from now  3.  Virtual video visit 6mo with labs prior

## 2020-11-18 NOTE — LETTER
2020      RE: Gaby Aguilar   [facility] [address]       Dear Colleague,    Thank you for the opportunity to participate in the care of your patient, Gaby Aguilar, at the [facility]. Please see a copy of my visit note below.    Service Date: 2020      Reshma Enriquez MD   [facility]     [address] [place] [address]      RE: Gaby Aguilar    MRN: [id number]   : 1960      Dear Dr. Enriquez:       It was a pleasure participating in the care of your patient Ms. Gaby Aguilar.  As you know, she is a 60-year-old lady whom I saw today over a virtual video visit via [employer] for paroxysmal AFib.         PAST MEDICAL HISTORY:      1.  Hyperlipidemia.   2.  Allergic rhinitis.   3.  Amenorrhea.   4.  Hysterectomy.      Her cardiac history is significant for documented paroxysmal atrial fibrillation/atrial flutter.  This was confirmed on a Zio patch monitor in 2018, which confirmed that her overall AFib burden was approximately 5%.      Her echocardiogram 2019 revealed a structurally normal heart.  CHADS-VASc2 score is 1 for female gender only.      I last saw her 2019.  At that time she was doing adequately.  She presents today for continuing care.      Since our last visit, we had increased her metoprolol to 25 mg twice daily, and she has not really felt much in terms of side effects, but she is also unsure if there has been any improvement in terms of number of palpitations.  She feels palpitations perhaps twice a week for about 2 minutes and feels her heart racing, but briefly, but she does not feel dizzy or lightheaded from this.  She just takes some deep breaths, and it goes away.  Her sleep study was negative on 2020.      She otherwise denies any significant chest pain, shortness of breath, PND, orthopnea, edema, syncope or near syncope.       REVIEW OF SYSTEMS:  A 10 point review of systems is otherwise unremarkable.      CURRENT MEDICATIONS:  Metoprolol succinate 25 twice daily.      PHYSICAL EXAMINATION:     VITAL SIGNS:  Her blood pressure today is 115/66 with a pulse of 55.   GENERAL:  She appears comfortable and well groomed.   PSYCHIATRIC:  She is alert and oriented x3.   EYES:  Her eyes do not appear grossly erythematous or have exudate.   RESPIRATORY:  She is breathing comfortably without gross cough.      The remainder of the comprehensive physical exam was deferred secondary to the COVID-19 pandemic and secondary to video visit restrictions.      LABORATORY:  On 03/06/2019, GFR normal.        IMAGING:  Echocardiogram 01/09/2019 reveals an ejection fraction of 60%-65% without gross valvular pathology.      Zio patch monitor 10/04/2019 revealed overall AFib burden of 5%.  Longest episode was 1 hour 28 minutes with pauses during sleeping hours.  Symptoms correlate with AFib and rapid rate of 143 beats per minute.        IMPRESSION:      Gaby is a 60-year-old lady whose cardiac history is significant for documented paroxysmal atrial fibrillation/atrial flutter.  Her CHADS-VASc2 score continues to be 1 for female gender only.      Echocardiogram 01/09/2019 revealed a structurally normal heart.  Despite increasing her Toprol to 25 mg twice daily, she continues to feel brief episodes of rapid palpitations perhaps twice a week.      She is currently not anticoagulated, as her current thromboembolic risk is approximately 1% per year.  We will attempt to medically manage and monitor her progress.        PLAN:     1.  Increase Toprol to 37.5 mg twice daily.     2.  Repeat 14 day Zio patch monitor in about 5 months.     3.  Virtual video visit followup appointment in 6 months with lab work prior, earlier if needed.      Once again, it was a pleasure participating in the care of your patient Ms. Gaby Aguilar.  Please feel free to contact me at any time  "if you have any questions regarding her care in the future.      Sincerely,            NANCY MORIN MD             D: 2020   T: 2020   MT: judy      Name:     GABY JAIMES   MRN:      [id number]        Account:      [id number]   :      1960           Service Date: 2020      Document: [id number]        Gaby Jaimes is a 60 year old female who is being evaluated via a billable video visit.      The patient has been notified of following:     \"This video visit will be conducted via a call between you and your physician/provider. We have found that certain health care needs can be provided without the need for an in-person physical exam.  This service lets us provide the care you need with a video conversation.  If a prescription is necessary we can send it directly to your pharmacy.  If lab work is needed we can place an order for that and you can then stop by our lab to have the test done at a later time.    Video visits are billed at different rates depending on your insurance coverage.  Please reach out to your insurance provider with any questions.    If during the course of the call the physician/provider feels a video visit is not appropriate, you will not be charged for this service.\"    Patient has given verbal consent for Video visit? Yes  How would you like to obtain your AVS? MyChart  If you are dropped from the video visit, the video invite should be resent to: Text to cell phone: [phone] [employer]ximity  Will anyone else be joining your video visit? No    Vitals - Patient Reported  Pain Score: No Pain (0)    Video-Visit Details    Type of service:  Video Visit    Video Start Time:106pm    Video End Time:120pm    Originating Location (pt. Location):patient home    Distant Location (provider location):  home office    Platform used for Video Visit: [employer]      Please do not hesitate to contact me if you have any questions/concerns.     Sincerely,     Nancy Morin MD    "

## 2021-01-15 ENCOUNTER — HEALTH MAINTENANCE LETTER (OUTPATIENT)
Age: 61
End: 2021-01-15

## 2021-02-18 ENCOUNTER — OFFICE VISIT (OUTPATIENT)
Dept: FAMILY MEDICINE | Facility: CLINIC | Age: 61
End: 2021-02-18

## 2021-02-18 VITALS
OXYGEN SATURATION: 98 % | SYSTOLIC BLOOD PRESSURE: 122 MMHG | RESPIRATION RATE: 20 BRPM | HEIGHT: 64 IN | BODY MASS INDEX: 24.11 KG/M2 | DIASTOLIC BLOOD PRESSURE: 78 MMHG | TEMPERATURE: 97.8 F | WEIGHT: 141.2 LBS | HEART RATE: 56 BPM

## 2021-02-18 DIAGNOSIS — Z13.1 SCREENING FOR DIABETES MELLITUS: ICD-10-CM

## 2021-02-18 DIAGNOSIS — Z12.31 ENCOUNTER FOR SCREENING MAMMOGRAM FOR BREAST CANCER: ICD-10-CM

## 2021-02-18 DIAGNOSIS — Z13.220 SCREENING CHOLESTEROL LEVEL: ICD-10-CM

## 2021-02-18 DIAGNOSIS — I10 ESSENTIAL HYPERTENSION: ICD-10-CM

## 2021-02-18 DIAGNOSIS — Z86.79 HX OF ATRIAL FLUTTER: ICD-10-CM

## 2021-02-18 DIAGNOSIS — Z01.411 ENCOUNTER FOR GYNECOLOGICAL EXAMINATION WITH ABNORMAL FINDING: Primary | ICD-10-CM

## 2021-02-18 DIAGNOSIS — Z12.11 SPECIAL SCREENING FOR MALIGNANT NEOPLASMS, COLON: ICD-10-CM

## 2021-02-18 PROBLEM — I48.92 ATRIAL FLUTTER (H): Status: ACTIVE | Noted: 2019-07-30

## 2021-02-18 PROBLEM — M19.049 LOCALIZED OSTEOARTHRITIS OF HAND: Status: ACTIVE | Noted: 2019-07-30

## 2021-02-18 PROBLEM — L60.8: Status: ACTIVE | Noted: 2019-07-30

## 2021-02-18 LAB
% GRANULOCYTES: 70.5 %
ALBUMIN SERPL-MCNC: 4.8 G/DL (ref 3.6–5.1)
ALBUMIN/GLOB SERPL: 2 {RATIO} (ref 1–2.5)
ALP SERPL-CCNC: 68 U/L (ref 33–130)
ALT 1742-6: 31 U/L (ref 0–32)
AST 1920-8: 21 U/L (ref 0–35)
BILIRUB SERPL-MCNC: 1.1 MG/DL (ref 0.2–1.2)
BUN SERPL-MCNC: 12 MG/DL (ref 7–25)
BUN/CREATININE RATIO: 13.5 (ref 6–22)
CALCIUM SERPL-MCNC: 10.2 MG/DL (ref 8.6–10.3)
CHLORIDE SERPLBLD-SCNC: 103.8 MMOL/L (ref 98–110)
CHOLEST SERPL-MCNC: 207 MG/DL (ref 0–199)
CHOLEST/HDLC SERPL: 4 {RATIO} (ref 0–5)
CO2 SERPL-SCNC: 27.8 MMOL/L (ref 20–32)
CREAT SERPL-MCNC: 0.89 MG/DL (ref 0.6–1.3)
GLOBULIN, CALCULATED - QUEST: 2.4 (ref 1.9–3.7)
GLUCOSE SERPL-MCNC: 117 MG/DL (ref 60–99)
HCT VFR BLD AUTO: 47.9 % (ref 35–47)
HDLC SERPL-MCNC: 51 MG/DL (ref 40–150)
HEMOGLOBIN: 15 G/DL (ref 11.7–15.7)
LDLC SERPL CALC-MCNC: 110 MG/DL (ref 0–130)
LYMPHOCYTES NFR BLD AUTO: 22.6 %
MCH RBC QN AUTO: 31.1 PG (ref 26–33)
MCHC RBC AUTO-ENTMCNC: 31.3 G/DL (ref 31–36)
MCV RBC AUTO: 99.2 FL (ref 78–100)
MONOCYTES NFR BLD AUTO: 6.9 %
PLATELET COUNT - QUEST: 294 10^9/L (ref 150–375)
POTASSIUM SERPL-SCNC: 4.69 MMOL/L (ref 3.5–5.3)
PROT SERPL-MCNC: 7.2 G/DL (ref 6.1–8.1)
RBC # BLD AUTO: 4.83 10*12/L (ref 3.8–5.2)
SODIUM SERPL-SCNC: 141.3 MMOL/L (ref 135–146)
TRIGL SERPL-MCNC: 228 MG/DL (ref 0–149)
WBC # BLD AUTO: 5.9 10*9/L (ref 4–11)

## 2021-02-18 PROCEDURE — 36415 COLL VENOUS BLD VENIPUNCTURE: CPT | Performed by: FAMILY MEDICINE

## 2021-02-18 PROCEDURE — 80053 COMPREHEN METABOLIC PANEL: CPT | Performed by: FAMILY MEDICINE

## 2021-02-18 PROCEDURE — 84443 ASSAY THYROID STIM HORMONE: CPT | Mod: 90 | Performed by: FAMILY MEDICINE

## 2021-02-18 PROCEDURE — 99396 PREV VISIT EST AGE 40-64: CPT | Performed by: FAMILY MEDICINE

## 2021-02-18 PROCEDURE — 85025 COMPLETE CBC W/AUTO DIFF WBC: CPT | Performed by: FAMILY MEDICINE

## 2021-02-18 PROCEDURE — 80061 LIPID PANEL: CPT | Performed by: FAMILY MEDICINE

## 2021-02-18 SDOH — HEALTH STABILITY: MENTAL HEALTH: HOW MANY STANDARD DRINKS CONTAINING ALCOHOL DO YOU HAVE ON A TYPICAL DAY?: 1 OR 2

## 2021-02-18 SDOH — HEALTH STABILITY: MENTAL HEALTH: HOW OFTEN DO YOU HAVE 6 OR MORE DRINKS ON ONE OCCASION?: NEVER

## 2021-02-18 SDOH — HEALTH STABILITY: MENTAL HEALTH: HOW OFTEN DO YOU HAVE A DRINK CONTAINING ALCOHOL?: 2-3 TIMES A WEEK

## 2021-02-18 ASSESSMENT — MIFFLIN-ST. JEOR: SCORE: 1191.51

## 2021-02-18 NOTE — NURSING NOTE
Patient is here for a full physical exam.    Pre-Visit Screening :  Immunizations : up to date  Colon Screening : is up to date  Mammogram: ordered  Asthma Action Test/Plan : NA  PHQ9 :  PHQ 2 done today  GAD7 :  NA  Patient's  BMI Body mass index is 24.43 kg/m .  Questioned patient about current smoking habits.  Pt. quit smoking some time ago.  OK to leave a detailed voice message regarding today's visit Yes, phone # [phone]      ETOH screening: addressed in history

## 2021-02-18 NOTE — PATIENT INSTRUCTIONS
Await labs    I will send to cardiology    Comprehensive profile obtained  Exercise encouraged  Fasting lipid profile obtained  Follow up in 1 year  Hemoglobin obtained  Routine breast self-exam encouraged  Seat belt use encouraged  Sunscreen recommended  Colonoscopy encouraged

## 2021-02-18 NOTE — PROGRESS NOTES
SUBJECTIVE:  Gaby Aguilar is an 60 year old  postmenopausal woman   who presents for annual gyn exam. Menopause at age 49/ hysterectomy in 2019 for endometrial polyp. No   bleeding, spotting, or discharge noted.     Estrogen replacement therapy: never  MARY exposure: no  History of abnormal Pap smear: No  Family history of uterine or ovarian cancer: No  Regular self breast exam: Yes  History of abnormal mammogram: No  Family history of breast cancer: Yes: maternal aunt  History of abnormal lipids: Yes: low HDL    Past Medical History:  2011: Allergic rhinitis      Comment:   Problem list name updated by automated process.                Provider to review  No date: Arthritis      Comment:  hands  2012: Diffuse cystic mastopathy  2018: Hx of atrial flutter  2008: Symptomatic menopausal or female climacteric states    Review of patient's family history indicates:  Problem: Cancer      Relation: Mother          Age of Onset: (Not Specified)          Comment: presacral cancer  Problem: Hypertension      Relation: Mother          Age of Onset: (Not Specified)  Problem: Hypertension      Relation: Father          Age of Onset: (Not Specified)  Problem: Prostate Cancer      Relation: Father          Age of Onset: (Not Specified)  Problem: Diabetes      Relation: Maternal Grandmother          Age of Onset: (Not Specified)  Problem: Breast Cancer      Relation: Maternal Aunt          Age of Onset: (Not Specified)  Problem: C.A.D.      Relation: No family hx of          Age of Onset: (Not Specified)  Problem: Cerebrovascular Disease      Relation: No family hx of          Age of Onset: (Not Specified)  Problem: Cancer - colorectal      Relation: No family hx of          Age of Onset: (Not Specified)      Past Surgical History:  1978: HC CORRECT BUNION,SIMPLE; Right  age 20: HC TOOTH EXTRACTION W/FORCEP  3/6/2019: LAPAROSCOPIC HYSTERECTOMY TOTAL, BILATERAL SALPINGO-  OOPHORECTOMY, COMBINED;  "Bilateral      Comment:  Procedure: Single port total laparoscopic hysterectomy                bilateral salpingo-oopherectomy;  Surgeon: Laisha Kathleen MD;  Location:  OR    Current Outpatient Medications:  acetaminophen (TYLENOL) 500 MG tablet  biotin 1000 MCG TABS tablet  Calcium Carbonate-Vitamin D (CALCIUM + D) 600-200 MG-UNIT per tablet  ibuprofen (ADVIL/MOTRIN) 200 MG tablet  metoprolol succinate ER (TOPROL-XL) 25 MG 24 hr tablet  Multiple Vitamins-Calcium (ONE-A-DAY WOMENS FORMULA) TABS  UNABLE TO FIND  VITAMIN E NATURAL PO    No current facility-administered medications for this visit.      -- Sulfa Drugs -- Hives    Social History    Tobacco Use      Smoking status: Former Smoker        Packs/day: 0.50        Years: 6.00        Pack years: 3        Quit date: 3/26/1982        Years since quittin.9      Smokeless tobacco: Never Used    Alcohol use: Yes      Alcohol/week: 7.0 standard drinks      Types: 7 Standard drinks or equivalent per week      Frequency: 2-3 times a week      Drinks per session: 1 or 2      Binge frequency: Never      Comment: 4-5 drinks week      Review Of Systems  Ears/Nose/Throat: negative  Respiratory: No shortness of breath, dyspnea on exertion, cough, or hemoptysis  Cardiovascular: negative  Gastrointestinal: colonocscopyWNl  Genitourinary: negative    OBJECTIVE:  /78 (BP Location: Right arm, Patient Position: Sitting, Cuff Size: Adult Regular)   Pulse 56   Temp 97.8  F (36.6  C) (Oral)   Ht 1.619 m (5' 3.75\")   Wt 64 kg (141 lb 3.2 oz)   LMP 2010   SpO2 98%   BMI 24.43 kg/m    General appearance: healthy, alert, no distress, cooperative and fatigued  Skin: Skin color, texture, turgor normal. No rashes or lesions.  Ears: negative  Nose/Sinuses: Nares normal. Septum midline. Mucosa normal. No drainage or sinus tenderness.  Oropharynx: Lips, mucosa, and tongue normal. Teeth and gums normal.  Neck: Neck supple. No adenopathy. Thyroid symmetric, " normal size,, Carotids without bruits.  Lungs: negative, Percussion normal. Good diaphragmatic excursion. Lungs clear  Heart: negative, PMI normal. No lifts, heaves, or thrills. RRR. No murmurs, clicks gallops or rub  Breasts: Inspection negative. No nipple discharge or bleeding. No masses.  Abdomen: Abdomen soft, non-tender. BS normal. No masses, organomegaly  Pelvic: Deferred  Ext; The lower extremities are normal and reveal no sign of DVT. Calves and thighs are soft and non tender, color is normal, no swelling or redness. Thomas's sign is negative.  Pedal pulses are normal.  BMI : Body mass index is 24.43 kg/m .    ASSESSMENT:  (Z01.411) Encounter for gynecological examination with abnormal finding  (primary encounter diagnosis)  Plan: VENOUS COLLECTION, HEMOGRAM PLATELET DIFF         (BFP), CANCELED: HEMOGLOBIN (BFP)  Comprehensive profile obtained  Exercise encouraged  Fasting lipid profile obtained  Follow up in 1 year  Hemoglobin obtained  Routine breast self-exam encouraged  Seat belt use encouraged  Sunscreen recommended            (Z86.79) Hx of atrial flutter  Plan: TSH with free T4 reflex ([facility])        Back to cardiology    (I10) Essential hypertension  Plan: Comprehensive Metobolic Panel (BFP), VENOUS         COLLECTION        Labs to cardiology    (Z13.1) Screening for diabetes mellitus  Plan: Comprehensive Metobolic Panel (BFP), VENOUS         COLLECTION            (Z13.220) Screening cholesterol level  Plan: Lipid Panel (BFP), VENOUS COLLECTION            (Z12.31) Encounter for screening mammogram for breast cancer  Plan: Mammo Screening digital (bilat), RADIOLOGY         REFERRAL            (Z12.11) Special screening for malignant neoplasms, colon  Plan: GASTROENTEROLOGY ADULT REF PROCEDURE ONLY              Dx:  1)  Pap smear, mammogram  2)  Lipids at appropriate intervals    PE:  Reviewed health maintenance including diet, regular exercise,   estrogen replacement and periodic exams.

## 2021-02-19 LAB — TSH SERPL-ACNC: 1.95 MIU/L (ref 0.4–4.5)

## 2021-03-05 LAB — MAMMOGRAM: NORMAL

## 2021-04-16 ENCOUNTER — TRANSFERRED RECORDS (OUTPATIENT)
Dept: FAMILY MEDICINE | Facility: CLINIC | Age: 61
End: 2021-04-16

## 2021-04-16 ENCOUNTER — IMMUNIZATION (OUTPATIENT)
Dept: NURSING | Facility: CLINIC | Age: 61
End: 2021-04-16
Payer: COMMERCIAL

## 2021-04-16 PROCEDURE — 91300 PR COVID VAC PFIZER DIL RECON 30 MCG/0.3 ML IM: CPT

## 2021-04-16 PROCEDURE — 0001A PR COVID VAC PFIZER DIL RECON 30 MCG/0.3 ML IM: CPT

## 2021-04-19 ENCOUNTER — HOSPITAL ENCOUNTER (OUTPATIENT)
Dept: CARDIOLOGY | Facility: CLINIC | Age: 61
Discharge: HOME | End: 2021-04-19
Attending: INTERNAL MEDICINE | Admitting: INTERNAL MEDICINE
Payer: COMMERCIAL

## 2021-04-19 DIAGNOSIS — I48.92 ATRIAL FLUTTER, UNSPECIFIED TYPE (H): ICD-10-CM

## 2021-04-19 PROCEDURE — 93242 EXT ECG>48HR<7D RECORDING: CPT

## 2021-04-19 PROCEDURE — 93244 EXT ECG>48HR<7D REV&INTERPJ: CPT | Performed by: INTERNAL MEDICINE

## 2021-05-07 ENCOUNTER — IMMUNIZATION (OUTPATIENT)
Dept: NURSING | Facility: CLINIC | Age: 61
End: 2021-05-07
Attending: INTERNAL MEDICINE
Payer: COMMERCIAL

## 2021-05-07 PROCEDURE — 91300 PR COVID VAC PFIZER DIL RECON 30 MCG/0.3 ML IM: CPT

## 2021-05-07 PROCEDURE — 0002A PR COVID VAC PFIZER DIL RECON 30 MCG/0.3 ML IM: CPT

## 2021-05-17 DIAGNOSIS — Z86.79 HX OF ATRIAL FLUTTER: Primary | ICD-10-CM

## 2021-05-23 DIAGNOSIS — E78.5 HYPERLIPIDEMIA LDL GOAL <100: ICD-10-CM

## 2021-05-23 DIAGNOSIS — I48.92 ATRIAL FLUTTER, UNSPECIFIED TYPE (H): ICD-10-CM

## 2021-05-23 LAB
ANION GAP SERPL CALCULATED.3IONS-SCNC: 3 MMOL/L (ref 3–14)
BUN SERPL-MCNC: 13 MG/DL (ref 7–30)
CALCIUM SERPL-MCNC: 8.8 MG/DL (ref 8.5–10.1)
CHLORIDE SERPL-SCNC: 105 MMOL/L (ref 94–109)
CHOLEST SERPL-MCNC: 185 MG/DL
CO2 SERPL-SCNC: 29 MMOL/L (ref 20–32)
CREAT SERPL-MCNC: 0.75 MG/DL (ref 0.52–1.04)
ERYTHROCYTE [DISTWIDTH] IN BLOOD BY AUTOMATED COUNT: 12.3 % (ref 10–15)
GFR SERPL CREATININE-BSD FRML MDRD: 86 ML/MIN/{1.73_M2}
GLUCOSE SERPL-MCNC: 90 MG/DL (ref 70–99)
HCT VFR BLD AUTO: 39.4 % (ref 35–47)
HDLC SERPL-MCNC: 45 MG/DL
HGB BLD-MCNC: 13.3 G/DL (ref 11.7–15.7)
LDLC SERPL CALC-MCNC: 109 MG/DL
MCH RBC QN AUTO: 32.3 PG (ref 26.5–33)
MCHC RBC AUTO-ENTMCNC: 33.8 G/DL (ref 31.5–36.5)
MCV RBC AUTO: 96 FL (ref 78–100)
NONHDLC SERPL-MCNC: 140 MG/DL
PLATELET # BLD AUTO: 270 10E9/L (ref 150–450)
POTASSIUM SERPL-SCNC: 4 MMOL/L (ref 3.4–5.3)
RBC # BLD AUTO: 4.12 10E12/L (ref 3.8–5.2)
SODIUM SERPL-SCNC: 137 MMOL/L (ref 133–144)
TRIGL SERPL-MCNC: 155 MG/DL
TSH SERPL DL<=0.005 MIU/L-ACNC: 1.3 MU/L (ref 0.4–4)
WBC # BLD AUTO: 5.3 10E9/L (ref 4–11)

## 2021-05-23 PROCEDURE — 80061 LIPID PANEL: CPT | Performed by: INTERNAL MEDICINE

## 2021-05-23 PROCEDURE — 85027 COMPLETE CBC AUTOMATED: CPT | Performed by: INTERNAL MEDICINE

## 2021-05-23 PROCEDURE — 80048 BASIC METABOLIC PNL TOTAL CA: CPT | Performed by: INTERNAL MEDICINE

## 2021-05-23 PROCEDURE — 84443 ASSAY THYROID STIM HORMONE: CPT | Performed by: INTERNAL MEDICINE

## 2021-05-23 PROCEDURE — 36415 COLL VENOUS BLD VENIPUNCTURE: CPT | Performed by: INTERNAL MEDICINE

## 2021-06-01 ENCOUNTER — HOSPITAL ENCOUNTER (OUTPATIENT)
Dept: CARDIOLOGY | Facility: CLINIC | Age: 61
Discharge: HOME | End: 2021-06-01
Attending: INTERNAL MEDICINE | Admitting: INTERNAL MEDICINE
Payer: COMMERCIAL

## 2021-06-01 DIAGNOSIS — Z86.79 HX OF ATRIAL FLUTTER: ICD-10-CM

## 2021-06-01 PROCEDURE — 93306 TTE W/DOPPLER COMPLETE: CPT

## 2021-06-01 PROCEDURE — 93306 TTE W/DOPPLER COMPLETE: CPT | Mod: 26 | Performed by: INTERNAL MEDICINE

## 2021-06-02 ENCOUNTER — VIRTUAL VISIT (OUTPATIENT)
Dept: CARDIOLOGY | Facility: CLINIC | Age: 61
End: 2021-06-02
Attending: FAMILY MEDICINE
Payer: COMMERCIAL

## 2021-06-02 DIAGNOSIS — E78.5 DYSLIPIDEMIA: ICD-10-CM

## 2021-06-02 DIAGNOSIS — I48.92 PAROXYSMAL ATRIAL FLUTTER (H): Primary | ICD-10-CM

## 2021-06-02 PROCEDURE — 99214 OFFICE O/P EST MOD 30 MIN: CPT | Mod: 95 | Performed by: INTERNAL MEDICINE

## 2021-06-02 NOTE — LETTER
6/2/2021      RE: Gaby Aguilar  [address]  [facility] [address]       Service Date: 06/02/2021       Reshma Enriquez MD  [facility]  [facility]  [place] [address]      Dear Ms Enriquez,       It was a pleasure participating in the care of your patient, Mr. Gaby Aguilar.  As you know, she is a 60-year-old lady, who I saw today over virtual video visit via [employer] for paroxysmal atrial fibrillation and hyperlipidemia.    PAST MEDICAL HISTORY:      1.  Hyperlipidemia.  2.  Allergic rhinitis.  3.  Amenorrhea.  4.  Hysterectomy.    Her cardiac history is significant for documented paroxysmal atrial fibrillation/atrial flutter.  This was confirmed initially on Zio Patch monitor in 2018 and more recently on 04/19/2021.  Her most recent Zio Patch monitor revealed a 3% burden of paroxysmal atrial flutter, with the longest episode being approximately a little over 2 hours.  Her YBC3GF6-HRRi score is 1 for female gender only and her echocardiogram 06/01/2021 revealed no evidence for gross structural pathology.    On her last visit 11/18/2020, we had increased her metoprolol and her blood pressures have been running in the 115/64 range with a pulse in the low 50s.  She does not get dizzy or lightheaded or orthostatic.  She denies any new chest pain, shortness of breath, PND, orthopnea, edema, palpitations, syncope or near syncope.    REVIEW OF SYSTEMS:  A 10-point review of systems is unremarkable.    CURRENT MEDICATIONS:  Include metoprolol succinate 37.5 twice daily.    PHYSICAL EXAMINATION:      VITAL SIGNS:  Her blood pressure is 115/64 with a pulse of 55.  GENERAL:  She appears comfortable, well-groomed.  PSYCHIATRIC:  She is alert and oriented x3.  HEENT:  Her eyes do not appear grossly erythematous or have exudate.  RESPIRATORY:  She is breathing comfortably without gross cough.    The remainder of the comprehensive physical exam was deferred secondary to the COVID-19  pandemic and secondary to video visit restrictions.    LABORATORY:  05/23/2021:  Triglycerides 155, potassium 4.0.  GFR normal.  Hemoglobin normal.  Thyroid normal.    Echocardiogram 06/01/2021 reveals normal LV systolic function, ejection fraction 55%-60%.  No significant valvular pathology identified.    A 14-day Zio Patch monitor on 04/19/2021 reveals a 3% burden of paroxysmal atrial flutter, longest duration over 2 hours.      IMPRESSION:      Gaby is a 60-year-old lady, who has several active cardiac issues:    1.  Paroxysmal atrial flutter.      YTQ2CV9-TRFp score is 1 for female gender only.  Echocardiogram 06/01/2021 reveals no evidence for gross structural pathology.  Her dose of Toprol 37.5 twice daily appears to be the maximum tolerated dose, as although she is not experiencing orthostasis, her baseline heart rate is in the low 50s and would likely drop to the 40s with any increase in medicine.    She is currently not anticoagulated and her current thromboembolic risk is approximately 1% per year.     2.  Hyperlipidemia.  She will attempt to manage this nonpharmacologically.      PLAN:    1.  Continue Toprol 37.5 twice daily and she will likely not tolerate an increase due to her low baseline heart rate.    2.  When she turns 65, we will likely repeat another monitor around that time and if the diagnosis of paroxysmal atrial fib/flutter is confirmed again, then we will likely start full anticoagulation at that time with a probable direct oral anticoagulant if possible.    3.  She will try sleeping on her side as some of the atrial flutter events occurred between midnight and 6:00 a.m.    4.  Her calculated 10-year risk score for cardiovascular disease was approximately 3.2% and she will attempt to eat a low-carb Mediterranean diet and add extra Metamucil to her daily routine.    Followup virtual video visit in 1 year with labwork prior, earlier if needed.    Once again, it was a pleasure participating in  the care of your patient, Ms. José Manuel Jaimes.  Please feel free to contact me at anytime if you have any questions regarding her care in the future.    Sincerely,      David Doyle M.D.      cc:  Reshma Enriquez MD  [address], [address]  [place] [address]      D: 2021   T: 2021   MT: al  Name:     JOSÉ MANUEL JAIMES  MRN:      [id number]        Account:      [id number]   :      1960           Service Date: 2021   Document: [id number]

## 2021-06-02 NOTE — PATIENT INSTRUCTIONS
Patient Instructions:  It was a pleasure to see you in the cardiology clinic today.      If you have any questions, you can reach my nurse, Ivory MACKEY LPN, at [phone].  Press Option #1 for the [facility], and then press Option #4 for nursing.    We are encouraging the use of [employer] to communicate with your HealthCare Provider    Medication Changes: None.    Recommendations: Fasting labs in one year prior to seeing Dr Doyle.    Studies Ordered: None.    The results from today include: Labs and echocardiogram.    Please follow up: With Dr. Doyle in one year.    Sincerely,    David Doyle MD     If you have an urgent need after hours (8:00 am to 4:30 pm) please call [phone] and ask for the cardiology fellow on call.

## 2021-06-02 NOTE — PROGRESS NOTES
"Gaby is a 60 year old who is being evaluated via a billable video visit.      How would you like to obtain your AVS? MyChart  If the video visit is dropped, the invitation should be resent by: Text to cell phone: [phone] [employer]  Will anyone else be joining your video visit? No     Vitals - Patient Reported  Weight (Patient Reported): 63.5 kg (140 lb)  Height (Patient Reported): 162.6 cm (5' 4\")  BMI (Based on Pt Reported Ht/Wt): 24.03  Pain Score: No Pain (0)(No SOB)    Vitals were taken and medication reconciled.    DOMINIQUE Singleton  1:06 PM    The patient has been notified of following:     \"This video visit will be conducted via a call between you and your physician/provider. We have found that certain health care needs can be provided without the need for an in-person physical exam.  This service lets us provide the care you need with a video conversation.  If a prescription is necessary we can send it directly to your pharmacy.  If lab work is needed we can place an order for that and you can then stop by our lab to have the test done at a later time.    Video visits are billed at different rates depending on your insurance coverage.  Please reach out to your insurance provider with any questions.    If during the course of the call the physician/provider feels a video visit is not appropriate, you will not be charged for this service.\"    Patient has given verbal consent for video visit? Yes    How would you like to obtain your AVS? Mail    Video-Visit Details    Type of service:  Video Visit    Video Start Time:134pm    Video End Time:157pm    Total visit time including video visit, chart review, charting, coordination of care =36min    Originating Location (pt. Location):patient home      Distant Location (provider location):  home office    Platform used for Video Visit: Anna    See dictation #[id number]    [facility]#:[id number]  "

## 2021-06-02 NOTE — LETTER
"6/2/2021      RE: Gaby Aguilar  [address] [facility] [address]       Dear Colleague,    Thank you for the opportunity to participate in the care of your patient, Gaby Aguilar, at the [facility] [place] [facility]. Please see a copy of my visit note below.    Gaby is a 60 year old who is being evaluated via a billable video visit.      How would you like to obtain your AVS? MyChart  If the video visit is dropped, the invitation should be resent by: Text to cell phone: [phone] [employer]  Will anyone else be joining your video visit? No     Vitals - Patient Reported  Weight (Patient Reported): 63.5 kg (140 lb)  Height (Patient Reported): 162.6 cm (5' 4\")  BMI (Based on Pt Reported Ht/Wt): 24.03  Pain Score: No Pain (0)(No SOB)    Vitals were taken and medication reconciled.    DOMINIQUE Singleton  1:06 PM    The patient has been notified of following:     \"This video visit will be conducted via a call between you and your physician/provider. We have found that certain health care needs can be provided without the need for an in-person physical exam.  This service lets us provide the care you need with a video conversation.  If a prescription is necessary we can send it directly to your pharmacy.  If lab work is needed we can place an order for that and you can then stop by our lab to have the test done at a later time.    Video visits are billed at different rates depending on your insurance coverage.  Please reach out to your insurance provider with any questions.    If during the course of the call the physician/provider feels a video visit is not appropriate, you will not be charged for this service.\"    Patient has given verbal consent for video visit? Yes    How would you like to obtain your AVS? Mail    Video-Visit Details    Type of service:  Video Visit    Video Start Time:134pm    Video End Time:157pm    Total visit time including " video visit, chart review, charting, coordination of care =36min    Originating Location (pt. Location):patient home      Distant Location (provider location):  home office    Platform used for Video Visit: Anna    See dictation #[id number]    [facility]#:[id number]      Please do not hesitate to contact me if you have any questions/concerns.     Sincerely,     David Doyle MD

## 2021-06-02 NOTE — PROGRESS NOTES
D: 2021   T: 2021   MT: al    Name:     JOSÉ MANUEL JAIMES  MRN:      [id number]        Account:      [id number]   :      1960           Service Date: 2021     Document: [id number]    Reshma Enriquez MD  [facility]  [address] [place] [address], [address]  [place] [address]      Dear Ms Enriquez,       It was a pleasure participating in the care of your patient, Mr. José Manuel Jaimes.  As you know, she is a 60-year-old lady, who I saw today over virtual video visit via [employer] for paroxysmal atrial fibrillation and hyperlipidemia.    PAST MEDICAL HISTORY:      1.  Hyperlipidemia.  2.  Allergic rhinitis.  3.  Amenorrhea.  4.  Hysterectomy.    Her cardiac history is significant for documented paroxysmal atrial fibrillation/atrial flutter.  This was confirmed initially on Zio Patch monitor in 2018 and more recently on 2021.  Her most recent Zio Patch monitor revealed a 3% burden of paroxysmal atrial flutter, with the longest episode being approximately a little over 2 hours.  Her BUS7VA5-CNEc score is 1 for female gender only and her echocardiogram 2021 revealed no evidence for gross structural pathology.    On her last visit 2020, we had increased her metoprolol and her blood pressures have been running in the 115/64 range with a pulse in the low 50s.  She does not get dizzy or lightheaded or orthostatic.  She denies any new chest pain, shortness of breath, PND, orthopnea, edema, palpitations, syncope or near syncope.    REVIEW OF SYSTEMS:  A 10-point review of systems is unremarkable.    CURRENT MEDICATIONS:  Include metoprolol succinate 37.5 twice daily.    PHYSICAL EXAMINATION:      VITAL SIGNS:  Her blood pressure is 115/64 with a pulse of 55.  GENERAL:  She appears comfortable, well-groomed.  PSYCHIATRIC:  She is alert and oriented x3.  HEENT:  Her eyes do not appear grossly erythematous or have exudate.  RESPIRATORY:  She is breathing comfortably without gross  cough.    The remainder of the comprehensive physical exam was deferred secondary to the COVID-19 pandemic and secondary to video visit restrictions.    LABORATORY:  05/23/2021:  Triglycerides 155, potassium 4.0.  GFR normal.  Hemoglobin normal.  Thyroid normal.    Echocardiogram 06/01/2021 reveals normal LV systolic function, ejection fraction 55%-60%.  No significant valvular pathology identified.    A 14-day Zio Patch monitor on 04/19/2021 reveals a 3% burden of paroxysmal atrial flutter, longest duration over 2 hours.      IMPRESSION:      Gaby is a 60-year-old lady, who has several active cardiac issues:    1.  Paroxysmal atrial flutter.      TZQ7PH6-WVMn score is 1 for female gender only.  Echocardiogram 06/01/2021 reveals no evidence for gross structural pathology.  Her dose of Toprol 37.5 twice daily appears to be the maximum tolerated dose, as although she is not experiencing orthostasis, her baseline heart rate is in the low 50s and would likely drop to the 40s with any increase in medicine.    She is currently not anticoagulated and her current thromboembolic risk is approximately 1% per year.     2.  Hyperlipidemia.  She will attempt to manage this nonpharmacologically.      PLAN:    1.  Continue Toprol 37.5 twice daily and she will likely not tolerate an increase due to her low baseline heart rate.    2.  When she turns 65, we will likely repeat another monitor around that time and if the diagnosis of paroxysmal atrial fib/flutter is confirmed again, then we will likely start full anticoagulation at that time with a probable direct oral anticoagulant if possible.    3.  She will try sleeping on her side as some of the atrial flutter events occurred between midnight and 6:00 a.m.    4.  Her calculated 10-year risk score for cardiovascular disease was approximately 3.2% and she will attempt to eat a low-carb Mediterranean diet and add extra Metamucil to her daily routine.    Followup virtual video visit in  1 year with labwork prior, earlier if needed.    Once again, it was a pleasure participating in the care of your patient, Ms. José Manuel Jaimes.  Please feel free to contact me at anytime if you have any questions regarding her care in the future.    Sincerely,          David Doyle M.D.      cc:  Reshma Enriquez MD  [address], [address]  [place] [address]    David Doyle MD        D: 2021   T: 2021   MT: al    Name:     JOSÉ MANUEL JAIMES  MRN:      [id number]        Account:      [id number]   :      1960           Service Date: 2021       Document: [id number]

## 2021-09-04 ENCOUNTER — HEALTH MAINTENANCE LETTER (OUTPATIENT)
Age: 61
End: 2021-09-04

## 2022-01-23 DIAGNOSIS — Z86.79 HX OF ATRIAL FLUTTER: ICD-10-CM

## 2022-01-27 RX ORDER — METOPROLOL SUCCINATE 25 MG/1
37.5 TABLET, EXTENDED RELEASE ORAL 2 TIMES DAILY
Qty: 360 TABLET | Refills: 1 | Status: SHIPPED | OUTPATIENT
Start: 2022-01-27 | End: 2022-07-21

## 2022-01-27 NOTE — TELEPHONE ENCOUNTER
Last Clinic Visit: 6/2/2021  [facility]

## 2022-04-16 ENCOUNTER — HEALTH MAINTENANCE LETTER (OUTPATIENT)
Age: 62
End: 2022-04-16

## 2022-06-02 ENCOUNTER — OFFICE VISIT (OUTPATIENT)
Dept: CARDIOLOGY | Facility: CLINIC | Age: 62
End: 2022-06-02
Payer: COMMERCIAL

## 2022-06-02 VITALS
BODY MASS INDEX: 25.61 KG/M2 | DIASTOLIC BLOOD PRESSURE: 78 MMHG | OXYGEN SATURATION: 99 % | SYSTOLIC BLOOD PRESSURE: 160 MMHG | HEIGHT: 64 IN | WEIGHT: 150 LBS | HEART RATE: 56 BPM

## 2022-06-02 DIAGNOSIS — R06.09 DYSPNEA ON EXERTION: ICD-10-CM

## 2022-06-02 DIAGNOSIS — I48.92 PAROXYSMAL ATRIAL FLUTTER (H): Primary | ICD-10-CM

## 2022-06-02 DIAGNOSIS — Z86.79 HX OF ATRIAL FLUTTER: ICD-10-CM

## 2022-06-02 DIAGNOSIS — R06.02 SHORTNESS OF BREATH: ICD-10-CM

## 2022-06-02 DIAGNOSIS — I48.92 ATRIAL FLUTTER, UNSPECIFIED TYPE (H): ICD-10-CM

## 2022-06-02 PROCEDURE — 99213 OFFICE O/P EST LOW 20 MIN: CPT | Performed by: INTERNAL MEDICINE

## 2022-06-02 PROCEDURE — 93000 ELECTROCARDIOGRAM COMPLETE: CPT | Performed by: INTERNAL MEDICINE

## 2022-06-02 RX ORDER — MULTIVIT-MIN/IRON/FOLIC ACID/K 18-600-40
CAPSULE ORAL
COMMUNITY

## 2022-06-02 NOTE — LETTER
Date:June 4, 2022      Patient was self referred, no letter generated. Do not send.        [facility] Health Information

## 2022-06-02 NOTE — LETTER
6/2/2022    Physician No Ref-Primary  No address on file    RE: Gaby Aguilar       Dear Colleague,     I had the pleasure of seeing Gaby Aguilar in the [facility].      Cardiology Clinic Progress Note:    June 2, 2022   Patient Name: Gaby Aguilar  Patient MRN: [id number]     Consult indication: atrial fibrillation/flutter    HPI:    I had the opportunity to see patient Gaby Aguilar in cardiology clinic for a follow up visit.    As you know, patient is a pleasant 61-year-old female with a past medical history significant for paroxysmal atrial fibrillation/atrial flutter, dyslipidemia, who presents for follow-up.    Patient was previously followed closely by our colleague Dr. Doyel, however for geographic reasons has transferred her care to the [facility].    Reviewed prior clinic notes, history of atrial fibrillation/flutter dates back to a Zio patch monitor 2018.  Most recent Zio patch monitor 4/2021 demonstrated a 3% burden of paroxysmal atrial flutter, the longest episode approximately 2 hours.  [id number] or is 1 for female gender only.  TTE 6/1/2021 demonstrates LVEF 55 to 60%, normal RV function, no significant valvular abnormalities.    She has been rate controlled on metoprolol succinate, currently on 37.5 mg twice daily, there was caution regarding up titration due to concern for bradycardia.  She has not been anticoagulated for stroke prevention.    Since last clinic visit on 6/2/2021, patient reports that overall she has been doing reasonably well, though more recently has noticed some mild dyspnea on exertion.  It seems that dyspnea was appreciated after COVID 19 illness and have improved slightly, though still remain present.  She denies any chest pain or chest pressure.  She denies any dizziness/lightheadedness, presyncope/syncope.  She denies symptoms of orthopnea/PND or abnormal lower extremity swelling.  She denies any excessive daytime sleepiness, nonrestorative  sleep.  She did undergo a sleep study 2/26/2020 that was negative for significant sleep disordered breathing.    ECG demonstrates sinus rhythm at 71 bpm with frequent PACs.    Assessment and Plan/Recommendations:    # Paroxysmal atrial fibrillation/flutter.  [id number] 1 for female gender.  TTE 6/2021 normal cardiac structure and function.  Frequent PACs on ECG today in clinic.  # Dyslipidemia, nonpharmacologically managed  # Dyspnea on exertion.  Could be related to COVID-19 illness.    -Discussed options for further evaluation management  - Revisited anticoagulation for the primary prevention of stroke.  Discussed risk/benefits.  Given [id number] of 1, recommend no therapeutic anticoagulation, which is in keeping with ACC/AHA guideline recommendations.  Patient is in agreement.  - Continue current management of metoprolol succinate 37.5 mg twice daily  - Chest x-ray  - ZioPatch monitor  - TTE  - Follow-up with cardiology KATY in 1 year, if stable please have her see me back the following year, or as needed    Thank you for allowing our team to participate in the care of Gaby Aguilar.  Please do not hesitate to call or page me with any questions or concerns.    Sincerely,     Shamir Araujo MD, [facility]  Cardiology  Text Page   June 2, 2022    Voice recognition software utilized.     Total time spent on this encounter: 25 minutes, providing care in this encounter including, but not limited to, reviewing prior medical records, laboratory data, imaging studies, diagnostic studies, procedure notes, formulating an assessment and plan, recommendations, discussion and counseling with patient face to face, dictation.    Past Medical History:     Past Medical History:   Diagnosis Date     Allergic rhinitis 5/27/2011     Problem list name updated by automated process. Provider to review     Arthritis     hands     Diffuse cystic mastopathy 12/27/2012     Hx of atrial flutter 12/14/2018     Symptomatic  menopausal or female climacteric states 1/9/2008        Past Surgical History:   Past Surgical History:   Procedure Laterality Date     HC CORRECT BUNION,SIMPLE Right 1978     HC TOOTH EXTRACTION W/FORCEP  age 20     LAPAROSCOPIC HYSTERECTOMY TOTAL, BILATERAL SALPINGO-OOPHORECTOMY, COMBINED Bilateral 3/6/2019    Procedure: Single port total laparoscopic hysterectomy bilateral salpingo-oopherectomy;  Surgeon: Laisha Kathleen MD;  Location: [facility] OR       Medications (outpatient):  Current Outpatient Medications   Medication Sig Dispense Refill     acetaminophen (TYLENOL) 500 MG tablet Take 500-1,000 mg by mouth every 6 hours as needed for mild pain       Ascorbic Acid (VITAMIN C) 500 MG CAPS Unknown dose at this time       biotin 1000 MCG TABS tablet Take 1,000 mcg by mouth daily       Calcium Carbonate-Vitamin D (CALCIUM + D) 600-200 MG-UNIT per tablet Take 2 tablets by mouth daily        ibuprofen (ADVIL/MOTRIN) 200 MG tablet Take 400 mg by mouth every 4 hours as needed for mild pain       metoprolol succinate ER (TOPROL-XL) 25 MG 24 hr tablet Take 1.5 tablets (37.5 mg) by mouth 2 times daily (DECREASE BACK TO 1 TABLET TWICE A DAY IF LIGHTHEADEDNESS OCCURS) 360 tablet 1     Multiple Vitamins-Calcium (ONE-A-DAY WOMENS FORMULA) TABS Take 1 tablet by mouth daily.       UNABLE TO FIND MEDICATION NAME: [employer] Allegra       VITAMIN E NATURAL PO          Allergies:  Allergies   Allergen Reactions     Sulfa Drugs Hives       Social History:   History   Drug Use No      History   Smoking Status     Former Smoker     Packs/day: 0.50     Years: 6.00     Quit date: 3/26/1982   Smokeless Tobacco     Never Used     Social History    Substance and Sexual Activity      Alcohol use: Yes        Alcohol/week: 7.0 standard drinks        Types: 7 Standard drinks or equivalent per week        Comment: 4-5 drinks week       Family History:  Family History   Problem Relation Age of Onset     Cancer Mother         presacral cancer      "Hypertension Mother      Hypertension Father      Prostate Cancer Father      Diabetes Maternal Grandmother      Breast Cancer Maternal Aunt      GUMARO No family hx of      Cerebrovascular Disease No family hx of      Cancer - colorectal No family hx of        Review of Systems:   A complete review of systems was negative except as mentioned in the History of Present Illness.     Objective & Physical Exam:  BP (!) 160/78 (BP Location: Right arm, Patient Position: Sitting, Cuff Size: Adult Regular)   Pulse 56   Ht 1.626 m (5' 4\")   Wt 68 kg (150 lb)   LMP 05/26/2010   SpO2 99%   BMI 25.75 kg/m    Wt Readings from Last 2 Encounters:   06/02/22 68 kg (150 lb)   02/18/21 64 kg (141 lb 3.2 oz)     Body mass index is 25.75 kg/m .   Body surface area is 1.75 meters squared.    Constitutional: appears stated age, in no apparent distress, appears to be well nourished  Head: normocephalic, atraumatic  Neck: supple, trachea midline  Pulmonary: clear to auscultation bilaterally, no wheezes, no rales, no increased work of breathing  Cardiovascular: JVP normal, regular rate, irregularly irregular rhythm, normal S1 and S2, no S3, S4, no murmur appreciated, no lower extremity edema  Gastrointestinal: no guarding, non-rigid   Neurologic: awake, alert, moves all extremities  Skin: no jaundice, warm on limited exam  Psychiatric: affect is normal, answers questions appropriately, oriented to self and place    Data reviewed:  Lab Results   Component Value Date    WBC 5.3 05/23/2021    RBC 4.12 05/23/2021    HGB 13.3 05/23/2021    HCT 39.4 05/23/2021    MCV 96 05/23/2021    MCH 32.3 05/23/2021    MCHC 33.8 05/23/2021    RDW 12.3 05/23/2021     05/23/2021     Sodium   Date Value Ref Range Status   05/23/2021 137 133 - 144 mmol/L Final     Potassium   Date Value Ref Range Status   05/23/2021 4.0 3.4 - 5.3 mmol/L Final     Chloride   Date Value Ref Range Status   05/23/2021 105 94 - 109 mmol/L Final     Carbon Dioxide   Date " Value Ref Range Status   05/23/2021 29 20 - 32 mmol/L Final     Anion Gap   Date Value Ref Range Status   05/23/2021 3 3 - 14 mmol/L Final     Glucose   Date Value Ref Range Status   05/23/2021 90 70 - 99 mg/dL Final     Comment:     Fasting specimen     Urea Nitrogen   Date Value Ref Range Status   05/23/2021 13 7 - 30 mg/dL Final     Creatinine   Date Value Ref Range Status   05/23/2021 0.75 0.52 - 1.04 mg/dL Final     GFR Estimate   Date Value Ref Range Status   05/23/2021 86 >60 mL/min/[1.73_m2] Final     Comment:     Non  GFR Calc  Starting 12/18/2018, serum creatinine based estimated GFR (eGFR) will be   calculated using the Chronic Kidney Disease Epidemiology Collaboration   (CKD-EPI) equation.       Calcium   Date Value Ref Range Status   05/23/2021 8.8 8.5 - 10.1 mg/dL Final     Bilirubin Total   Date Value Ref Range Status   02/18/2021 1.1 0.2 - 1.2 mg/dL Final     Alkaline Phosphatase   Date Value Ref Range Status   02/18/2021 68 33 - 130 U/L Final     ALT   Date Value Ref Range Status   11/09/2016 16 0 - 32 U/L Final     AST   Date Value Ref Range Status   11/09/2016 15 0 - 40 U/L Final     Recent Labs   Lab Test 05/23/21  0939 02/18/21  1139 06/12/19  0914 11/09/16  0000   CHOL 185 207*   < > 215*   HDL 45* 51   < > 48   * 110   < > 128*   TRIG 155* 228*   < > 193*   CHOLHDLRATIO  --  4  --  4.5*    < > = values in this interval not displayed.      Lab Results   Component Value Date    A1C 5.2 04/13/2017        No results found for this or any previous visit (from the past 4320 hour(s)).         Thank you for allowing me to participate in the care of your patient.      Sincerely,     Shamir Araujo MD     [facility]  cc:   Alonso Renner MD  No address on file

## 2022-06-02 NOTE — PATIENT INSTRUCTIONS
June 2, 2022    Thank you for allowing our Cardiology team to participate in your care.     Please note the following changes to your heart treatment plan:     Medication changes:   - monitor BP at home, call if BP is consistently >130/80 mmHg    Tests to be done:  - ZioPatch monitor  - TTE (heart ultrasound)  - Chest x-ray    Follow up:  - Follow up in 1 year with cardiology KATY and with me a year after that, or sooner as needed.      Please contact our team at [phone] or [phone] for any questions or concerns.   For scheduling, please call [phone].  If you are having a medical emergency, please call [phone].     Sincerely,    Shamir Araujo MD, FACC  Cardiology    [facility] [place]

## 2022-06-02 NOTE — PROGRESS NOTES
Cardiology Clinic Progress Note:    June 2, 2022   Patient Name: Gaby Aguilar  Patient MRN: [id number]     Consult indication: atrial fibrillation/flutter    HPI:    I had the opportunity to see patient Gaby Aguilar in cardiology clinic for a follow up visit.    As you know, patient is a pleasant 61-year-old female with a past medical history significant for paroxysmal atrial fibrillation/atrial flutter, dyslipidemia, who presents for follow-up.    Patient was previously followed closely by our colleague Dr. Doyle, however for geographic reasons has transferred her care to the [facility].    Reviewed prior clinic notes, history of atrial fibrillation/flutter dates back to a Zio patch monitor 2018.  Most recent Zio patch monitor 4/2021 demonstrated a 3% burden of paroxysmal atrial flutter, the longest episode approximately 2 hours.  [id number] or is 1 for female gender only.  TTE 6/1/2021 demonstrates LVEF 55 to 60%, normal RV function, no significant valvular abnormalities.    She has been rate controlled on metoprolol succinate, currently on 37.5 mg twice daily, there was caution regarding up titration due to concern for bradycardia.  She has not been anticoagulated for stroke prevention.    Since last clinic visit on 6/2/2021, patient reports that overall she has been doing reasonably well, though more recently has noticed some mild dyspnea on exertion.  It seems that dyspnea was appreciated after COVID 19 illness and have improved slightly, though still remain present.  She denies any chest pain or chest pressure.  She denies any dizziness/lightheadedness, presyncope/syncope.  She denies symptoms of orthopnea/PND or abnormal lower extremity swelling.  She denies any excessive daytime sleepiness, nonrestorative sleep.  She did undergo a sleep study 2/26/2020 that was negative for significant sleep disordered breathing.    ECG demonstrates sinus rhythm at 71 bpm with frequent PACs.    Assessment and  Plan/Recommendations:    # Paroxysmal atrial fibrillation/flutter.  [id number] 1 for female gender.  TTE 6/2021 normal cardiac structure and function.  Frequent PACs on ECG today in clinic.  # Dyslipidemia, nonpharmacologically managed  # Dyspnea on exertion.  Could be related to COVID-19 illness.    -Discussed options for further evaluation management  - Revisited anticoagulation for the primary prevention of stroke.  Discussed risk/benefits.  Given OSI2LV1-VBLg of 1, recommend no therapeutic anticoagulation, which is in keeping with ACC/AHA guideline recommendations.  Patient is in agreement.  - Continue current management of metoprolol succinate 37.5 mg twice daily  - Chest x-ray  - ZioPatch monitor  - TTE  - Follow-up with cardiology KATY in 1 year, if stable please have her see me back the following year, or as needed    Thank you for allowing our team to participate in the care of Gaby Aguilar.  Please do not hesitate to call or page me with any questions or concerns.    Sincerely,     Shamir Araujo MD, [facility]  Cardiology  Text Page   June 2, 2022    Voice recognition software utilized.     Total time spent on this encounter: 25 minutes, providing care in this encounter including, but not limited to, reviewing prior medical records, laboratory data, imaging studies, diagnostic studies, procedure notes, formulating an assessment and plan, recommendations, discussion and counseling with patient face to face, dictation.    Past Medical History:     Past Medical History:   Diagnosis Date     Allergic rhinitis 5/27/2011     Problem list name updated by automated process. Provider to review     Arthritis     hands     Diffuse cystic mastopathy 12/27/2012     Hx of atrial flutter 12/14/2018     Symptomatic menopausal or female climacteric states 1/9/2008        Past Surgical History:   Past Surgical History:   Procedure Laterality Date     HC CORRECT BUNION,SIMPLE Right 1978     HC TOOTH EXTRACTION  W/FORCEP  age 20     LAPAROSCOPIC HYSTERECTOMY TOTAL, BILATERAL SALPINGO-OOPHORECTOMY, COMBINED Bilateral 3/6/2019    Procedure: Single port total laparoscopic hysterectomy bilateral salpingo-oopherectomy;  Surgeon: Laisha Kathleen MD;  Location:  OR       Medications (outpatient):  Current Outpatient Medications   Medication Sig Dispense Refill     acetaminophen (TYLENOL) 500 MG tablet Take 500-1,000 mg by mouth every 6 hours as needed for mild pain       Ascorbic Acid (VITAMIN C) 500 MG CAPS Unknown dose at this time       biotin 1000 MCG TABS tablet Take 1,000 mcg by mouth daily       Calcium Carbonate-Vitamin D (CALCIUM + D) 600-200 MG-UNIT per tablet Take 2 tablets by mouth daily        ibuprofen (ADVIL/MOTRIN) 200 MG tablet Take 400 mg by mouth every 4 hours as needed for mild pain       metoprolol succinate ER (TOPROL-XL) 25 MG 24 hr tablet Take 1.5 tablets (37.5 mg) by mouth 2 times daily (DECREASE BACK TO 1 TABLET TWICE A DAY IF LIGHTHEADEDNESS OCCURS) 360 tablet 1     Multiple Vitamins-Calcium (ONE-A-DAY WOMENS FORMULA) TABS Take 1 tablet by mouth daily.       UNABLE TO FIND MEDICATION NAME: [employer] Allegra       VITAMIN E NATURAL PO          Allergies:  Allergies   Allergen Reactions     Sulfa Drugs Hives       Social History:   History   Drug Use No      History   Smoking Status     Former Smoker     Packs/day: 0.50     Years: 6.00     Quit date: 3/26/1982   Smokeless Tobacco     Never Used     Social History    Substance and Sexual Activity      Alcohol use: Yes        Alcohol/week: 7.0 standard drinks        Types: 7 Standard drinks or equivalent per week        Comment: 4-5 drinks week       Family History:  Family History   Problem Relation Age of Onset     Cancer Mother         presacral cancer     Hypertension Mother      Hypertension Father      Prostate Cancer Father      Diabetes Maternal Grandmother      Breast Cancer Maternal Aunt      C.A.D. No family hx of      Cerebrovascular Disease No  "family hx of      Cancer - colorectal No family hx of        Review of Systems:   A complete review of systems was negative except as mentioned in the History of Present Illness.     Objective & Physical Exam:  BP (!) 160/78 (BP Location: Right arm, Patient Position: Sitting, Cuff Size: Adult Regular)   Pulse 56   Ht 1.626 m (5' 4\")   Wt 68 kg (150 lb)   LMP 05/26/2010   SpO2 99%   BMI 25.75 kg/m    Wt Readings from Last 2 Encounters:   06/02/22 68 kg (150 lb)   02/18/21 64 kg (141 lb 3.2 oz)     Body mass index is 25.75 kg/m .   Body surface area is 1.75 meters squared.    Constitutional: appears stated age, in no apparent distress, appears to be well nourished  Head: normocephalic, atraumatic  Neck: supple, trachea midline  Pulmonary: clear to auscultation bilaterally, no wheezes, no rales, no increased work of breathing  Cardiovascular: JVP normal, regular rate, irregularly irregular rhythm, normal S1 and S2, no S3, S4, no murmur appreciated, no lower extremity edema  Gastrointestinal: no guarding, non-rigid   Neurologic: awake, alert, moves all extremities  Skin: no jaundice, warm on limited exam  Psychiatric: affect is normal, answers questions appropriately, oriented to self and place    Data reviewed:  Lab Results   Component Value Date    WBC 5.3 05/23/2021    RBC 4.12 05/23/2021    HGB 13.3 05/23/2021    HCT 39.4 05/23/2021    MCV 96 05/23/2021    MCH 32.3 05/23/2021    MCHC 33.8 05/23/2021    RDW 12.3 05/23/2021     05/23/2021     Sodium   Date Value Ref Range Status   05/23/2021 137 133 - 144 mmol/L Final     Potassium   Date Value Ref Range Status   05/23/2021 4.0 3.4 - 5.3 mmol/L Final     Chloride   Date Value Ref Range Status   05/23/2021 105 94 - 109 mmol/L Final     Carbon Dioxide   Date Value Ref Range Status   05/23/2021 29 20 - 32 mmol/L Final     Anion Gap   Date Value Ref Range Status   05/23/2021 3 3 - 14 mmol/L Final     Glucose   Date Value Ref Range Status   05/23/2021 90 70 - " 99 mg/dL Final     Comment:     Fasting specimen     Urea Nitrogen   Date Value Ref Range Status   05/23/2021 13 7 - 30 mg/dL Final     Creatinine   Date Value Ref Range Status   05/23/2021 0.75 0.52 - 1.04 mg/dL Final     GFR Estimate   Date Value Ref Range Status   05/23/2021 86 >60 mL/min/[1.73_m2] Final     Comment:     Non  GFR Calc  Starting 12/18/2018, serum creatinine based estimated GFR (eGFR) will be   calculated using the Chronic Kidney Disease Epidemiology Collaboration   (CKD-EPI) equation.       Calcium   Date Value Ref Range Status   05/23/2021 8.8 8.5 - 10.1 mg/dL Final     Bilirubin Total   Date Value Ref Range Status   02/18/2021 1.1 0.2 - 1.2 mg/dL Final     Alkaline Phosphatase   Date Value Ref Range Status   02/18/2021 68 33 - 130 U/L Final     ALT   Date Value Ref Range Status   11/09/2016 16 0 - 32 U/L Final     AST   Date Value Ref Range Status   11/09/2016 15 0 - 40 U/L Final     Recent Labs   Lab Test 05/23/21  0939 02/18/21  1139 06/12/19  0914 11/09/16  0000   CHOL 185 207*   < > 215*   HDL 45* 51   < > 48   * 110   < > 128*   TRIG 155* 228*   < > 193*   CHOLHDLRATIO  --  4  --  4.5*    < > = values in this interval not displayed.      Lab Results   Component Value Date    A1C 5.2 04/13/2017        No results found for this or any previous visit (from the past 4320 hour(s)).

## 2022-06-07 ENCOUNTER — HOSPITAL ENCOUNTER (OUTPATIENT)
Dept: CARDIOLOGY | Facility: CLINIC | Age: 62
Discharge: HOME | End: 2022-06-07
Attending: INTERNAL MEDICINE | Admitting: INTERNAL MEDICINE
Payer: COMMERCIAL

## 2022-06-07 DIAGNOSIS — I48.92 PAROXYSMAL ATRIAL FLUTTER (H): ICD-10-CM

## 2022-06-07 PROCEDURE — 93246 EXT ECG>7D<15D RECORDING: CPT

## 2022-06-07 PROCEDURE — 93248 EXT ECG>7D<15D REV&INTERPJ: CPT | Performed by: INTERNAL MEDICINE

## 2022-06-11 ENCOUNTER — HEALTH MAINTENANCE LETTER (OUTPATIENT)
Age: 62
End: 2022-06-11

## 2022-06-28 ENCOUNTER — TELEPHONE (OUTPATIENT)
Dept: CARDIOLOGY | Facility: CLINIC | Age: 62
End: 2022-06-28

## 2022-06-28 DIAGNOSIS — I48.92 PAROXYSMAL ATRIAL FLUTTER (H): Primary | ICD-10-CM

## 2022-06-28 DIAGNOSIS — I48.91 ATRIAL FIBRILLATION (H): ICD-10-CM

## 2022-06-28 NOTE — TELEPHONE ENCOUNTER
LOV 6/2/22 with Dr. Araujo for follow up in pt with HX paroxysmal atrial fibrillation/atrial flutter, dyslipidemia     # Paroxysmal atrial fibrillation/flutter.  [id number] 1 for female gender.  TTE 6/2021 normal cardiac structure and function.  Frequent PACs on ECG today in clinic  - Hardik for eval.      Ziopatch:   1) Paroxysmal Afib / Aflutter:   ~ burden 10%  ~ longest = 1hr 41min   ~ tachy zach features with pauses up to 3.5sec on Afib termination.     ----- Message from Shamir Araujo MD sent at 6/28/2022  6:40 AM CDT -----  Results reviewed, recommend follow up with EP for assistance with management of atrial fibrillation and atrial flutter with frequent pauses over 3s, thanks!      Call placed to pt to review results and recommendations. Pt verbalized understanding. Orders placed per MD. Pt given direct dial number for questions / concerns.   ERASTO Pool RN, BSN.

## 2022-07-21 ENCOUNTER — OFFICE VISIT (OUTPATIENT)
Dept: CARDIOLOGY | Facility: CLINIC | Age: 62
End: 2022-07-21
Attending: INTERNAL MEDICINE
Payer: COMMERCIAL

## 2022-07-21 VITALS
SYSTOLIC BLOOD PRESSURE: 137 MMHG | BODY MASS INDEX: 25.27 KG/M2 | HEART RATE: 59 BPM | WEIGHT: 148 LBS | HEIGHT: 64 IN | DIASTOLIC BLOOD PRESSURE: 80 MMHG | OXYGEN SATURATION: 98 %

## 2022-07-21 DIAGNOSIS — I49.5 SSS (SICK SINUS SYNDROME) (H): Primary | ICD-10-CM

## 2022-07-21 DIAGNOSIS — I48.0 PAROXYSMAL ATRIAL FIBRILLATION (H): ICD-10-CM

## 2022-07-21 DIAGNOSIS — I48.92 PAROXYSMAL ATRIAL FLUTTER (H): ICD-10-CM

## 2022-07-21 PROCEDURE — 99204 OFFICE O/P NEW MOD 45 MIN: CPT | Performed by: INTERNAL MEDICINE

## 2022-07-21 RX ORDER — METOPROLOL SUCCINATE 25 MG/1
25 TABLET, EXTENDED RELEASE ORAL 2 TIMES DAILY
Qty: 360 TABLET | Refills: 1 | COMMUNITY
Start: 2022-07-21 | End: 2022-07-25 | Stop reason: DRUGHIGH

## 2022-07-21 NOTE — PROGRESS NOTES
HPI and Plan:   See dictation [id number]      Orders Placed This Encounter   Procedures     Follow-Up with Cardiology KATY     Leadless EKG Monitor 3 to 7 Days       Orders Placed This Encounter   Medications     metoprolol succinate ER (TOPROL XL) 25 MG 24 hr tablet     Sig: Take 1 tablet (25 mg) by mouth 2 times daily (DECREASE BACK TO 1 TABLET TWICE A DAY IF LIGHTHEADEDNESS OCCURS)     Dispense:  360 tablet     Refill:  1       Medications Discontinued During This Encounter   Medication Reason     metoprolol succinate ER (TOPROL-XL) 25 MG 24 hr tablet Reorder         Encounter Diagnoses   Name Primary?     SSS (sick sinus syndrome) (H) Yes     Paroxysmal atrial fibrillation (H)      Paroxysmal atrial flutter (H)      Hx of atrial flutter        CURRENT MEDICATIONS:  Current Outpatient Medications   Medication Sig Dispense Refill     acetaminophen (TYLENOL) 500 MG tablet Take 500-1,000 mg by mouth every 6 hours as needed for mild pain       Ascorbic Acid (VITAMIN C) 500 MG CAPS Unknown dose at this time       biotin 1000 MCG TABS tablet Take 1,000 mcg by mouth daily       Calcium Carbonate-Vitamin D (CALCIUM + D) 600-200 MG-UNIT per tablet Take 2 tablets by mouth daily        ibuprofen (ADVIL/MOTRIN) 200 MG tablet Take 400 mg by mouth every 4 hours as needed for mild pain       metoprolol succinate ER (TOPROL XL) 25 MG 24 hr tablet Take 1 tablet (25 mg) by mouth 2 times daily (DECREASE BACK TO 1 TABLET TWICE A DAY IF LIGHTHEADEDNESS OCCURS) 360 tablet 1     Multiple Vitamins-Calcium (ONE-A-DAY WOMENS FORMULA) TABS Take 1 tablet by mouth daily.       UNABLE TO FIND MEDICATION NAME: [employer] Allegra       VITAMIN E NATURAL PO          ALLERGIES     Allergies   Allergen Reactions     Sulfa Drugs Hives       PAST MEDICAL HISTORY:  Past Medical History:   Diagnosis Date     Allergic rhinitis 5/27/2011     Problem list name updated by automated process. Provider to review     Arthritis     hands     Diffuse cystic mastopathy  2012     Hx of atrial flutter 2018     Symptomatic menopausal or female climacteric states 2008       PAST SURGICAL HISTORY:  Past Surgical History:   Procedure Laterality Date     HC CORRECT BUNION,SIMPLE Right      HC TOOTH EXTRACTION W/FORCEP  age 20     LAPAROSCOPIC HYSTERECTOMY TOTAL, BILATERAL SALPINGO-OOPHORECTOMY, COMBINED Bilateral 3/6/2019    Procedure: Single port total laparoscopic hysterectomy bilateral salpingo-oopherectomy;  Surgeon: Laisha Kathleen MD;  Location: [facility] OR       FAMILY HISTORY:  Family History   Problem Relation Age of Onset     Cancer Mother         presacral cancer     Hypertension Mother      Hypertension Father      Prostate Cancer Father      Diabetes Maternal Grandmother      Breast Cancer Maternal Aunt      C.A.D. 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   Tobacco Use     Smoking status: Former Smoker     Packs/day: 0.50     Years: 6.00     Pack years: 3.00     Quit date: 3/26/1982     Years since quittin.3     Smokeless tobacco: Never Used   Substance and Sexual Activity     Alcohol use: Yes     Alcohol/week: 7.0 standard drinks     Types: 7 Standard drinks or equivalent per week     Comment: 4-5 drinks week     Drug use: No     Sexual activity: Yes     Partners: Male     Birth control/protection: Post-menopausal     Comment:    Other Topics Concern      Service No     Blood Transfusions No     Caffeine Concern Yes     Occupational Exposure No     Hobby Hazards No     Sleep Concern No     Stress Concern No     Weight Concern No     Special Diet No     Exercise Yes     Seat Belt Yes      Self-Exams Yes         CC  Shamir Araujo MD  [address], [address]  [place] [id number]

## 2022-07-21 NOTE — PROGRESS NOTES
"Service Date: 07/21/2022    CONSULTATION     HISTORY OF PRESENT ILLNESS:    I had the pleasure of seeing Ms. Gaby Aguilar at Dr. Araujo's request.  She is a delightful 61-year-old female with paroxysmal atrial fibrillation and recently diagnosed sinus pauses.    Ms. Aguilar was diagnosed with AF in 2018.  AF burden was 3% in 2021.  The patient was previously seen by Dr. Mason Doyle at [facility] and recently established care with Dr. Shamir Araujo.    Dr. Araujo ordered a 14 day cardiac monitor, which I reviewed.  It showed paroxysmal atrial fibrillation and atrial flutter with a burden of 10%.  Longest event was 1 hour 41 minutes, and rate control was generally good.  Upon termination of atrial fibrillation or flutter, sinus pauses occurred, longest was 3.5 seconds.  Most pauses occurred in the early a.m. hours, although at least a couple happened while the patient was awake.    In coming in today, Ms. Aguilar stated she feels well and has almost no awareness of atrial fibrillation.  Importantly, she has not had syncope, near syncope or sudden onset dizzy spells, except when she gets up quickly.    She is physically active and exercises approximately 1 hour per day.  She lives in [place] with her .  She is a nonsmoker and drinks 1-2 glasses of wine per day.    No clear family history of heart disease.  Her father had \"angina,\" but she does not believe he ever had a cardiac intervention.      PHYSICAL EXAMINATION:    VITAL SIGNS:  Blood pressure 137/80, heart rate 59 and regular, weight 67 kg, height 163 cm.  GENERAL:  She is a pleasant, healthy-appearing female in no distress.  HEAD:  Normocephalic.  NECK:  Supple without bruits.  LUNGS:  Clear.  CARDIOVASCULAR:  Normal JVP. Regular rhythm. No gallop, murmur or rub.  ABDOMEN:  Soft, nontender.  EXTREMITIES:  No edema.      DIAGNOSTIC STUDIES:    - Laboratory:  Sodium 137, potassium 4.0, creatinine 0.75, calcium 8.8.  Cholesterol 185, HDL 45, .  TSH 1.3, hematocrit " 39%.  - For results of her recent cardiac monitor, see HPI.  - Her echocardiogram in 2021 showed EF 55%-60%, normal RV, no significant valvular abnormalities.  Trace MR and TR.      IMPRESSION:    1.  Paroxysmal atrial fibrillation and atrial flutter.  Minimally symptomatic.  No apparent etiology.  The patient has tested negative for sleep apnea, does not drink excess alcohol and does not have family history of AF. Normal LVEF.         Anticoagulation is not necessary given that her [id number] score is only 1 (gender).    2.  Sick sinus syndrome.  She exhibits sinus pauses, up to 3.5 seconds, upon termination of AF.  So far she has been asymptomatic.  I asked her to significantly decrease metoprolol XL.          More importantly, we discussed in detail symptoms to watch for that may indicate that her pauses are getting worse (sudden onset dizziness or graying of vision, near syncope/syncope).  In that case, she would require implantation of a permanent pacemaker.      RECOMMENDATIONS:    a.  Decrease Toprol XL from 37.5 mg b.i.d. to 25 mg daily.  b.  I asked her to call us ASAP should she experience any of the above symptoms, as this would suggest that her pauses are becoming symptomatic.  c.  Unless she has issues in the interim, follow-up in the clinic in 1 year with a 7-day cardiac monitor before the visit.    I appreciate the opportunity to be part of her care.  Please feel free to contact me with any questions.      Jessica Way MD, FACC      cc:  Shamir Araujo MD  [facility]   [address], [address] [place] [address]    D: 2022   T: 2022   MT: judy    Name:     JOSÉ MANUEL JAIMES  MRN:      [id number]        Account:      [id number]   :      1960           Service Date: 2022       Document: [id number]

## 2022-07-21 NOTE — LETTER
7/21/2022    Physician No Ref-Primary  No address on file    RE: Gaby Aguilar       Dear Colleague,     I had the pleasure of seeing Gaby Aguilar in the [facility].  HPI and Plan:   See dictation [id number]      Orders Placed This Encounter   Procedures     Follow-Up with Cardiology KATY     Leadless EKG Monitor 3 to 7 Days       Orders Placed This Encounter   Medications     metoprolol succinate ER (TOPROL XL) 25 MG 24 hr tablet     Sig: Take 1 tablet (25 mg) by mouth 2 times daily (DECREASE BACK TO 1 TABLET TWICE A DAY IF LIGHTHEADEDNESS OCCURS)     Dispense:  360 tablet     Refill:  1       Medications Discontinued During This Encounter   Medication Reason     metoprolol succinate ER (TOPROL-XL) 25 MG 24 hr tablet Reorder         Encounter Diagnoses   Name Primary?     SSS (sick sinus syndrome) (H) Yes     Paroxysmal atrial fibrillation (H)      Paroxysmal atrial flutter (H)      Hx of atrial flutter        CURRENT MEDICATIONS:  Current Outpatient Medications   Medication Sig Dispense Refill     acetaminophen (TYLENOL) 500 MG tablet Take 500-1,000 mg by mouth every 6 hours as needed for mild pain       Ascorbic Acid (VITAMIN C) 500 MG CAPS Unknown dose at this time       biotin 1000 MCG TABS tablet Take 1,000 mcg by mouth daily       Calcium Carbonate-Vitamin D (CALCIUM + D) 600-200 MG-UNIT per tablet Take 2 tablets by mouth daily        ibuprofen (ADVIL/MOTRIN) 200 MG tablet Take 400 mg by mouth every 4 hours as needed for mild pain       metoprolol succinate ER (TOPROL XL) 25 MG 24 hr tablet Take 1 tablet (25 mg) by mouth 2 times daily (DECREASE BACK TO 1 TABLET TWICE A DAY IF LIGHTHEADEDNESS OCCURS) 360 tablet 1     Multiple Vitamins-Calcium (ONE-A-DAY WOMENS FORMULA) TABS Take 1 tablet by mouth daily.       UNABLE TO FIND MEDICATION NAME: [employer] Allegra       VITAMIN E NATURAL PO          ALLERGIES     Allergies   Allergen Reactions     Sulfa Drugs Hives       PAST MEDICAL HISTORY:  Past  Medical History:   Diagnosis Date     Allergic rhinitis 2011     Problem list name updated by automated process. Provider to review     Arthritis     hands     Diffuse cystic mastopathy 2012     Hx of atrial flutter 2018     Symptomatic menopausal or female climacteric states 2008       PAST SURGICAL HISTORY:  Past Surgical History:   Procedure Laterality Date     HC CORRECT BUNION,SIMPLE Right 1978     HC TOOTH EXTRACTION W/FORCEP  age 20     LAPAROSCOPIC HYSTERECTOMY TOTAL, BILATERAL SALPINGO-OOPHORECTOMY, COMBINED Bilateral 3/6/2019    Procedure: Single port total laparoscopic hysterectomy bilateral salpingo-oopherectomy;  Surgeon: Laisha Kathleen MD;  Location: [facility] OR       FAMILY HISTORY:  Family History   Problem Relation Age of Onset     Cancer Mother         presacral cancer     Hypertension Mother      Hypertension Father      Prostate Cancer Father      Diabetes Maternal Grandmother      Breast Cancer Maternal Aunt      C.A.D. 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   Tobacco Use     Smoking status: Former Smoker     Packs/day: 0.50     Years: 6.00     Pack years: 3.00     Quit date: 3/26/1982     Years since quittin.3     Smokeless tobacco: Never Used   Substance and Sexual Activity     Alcohol use: Yes     Alcohol/week: 7.0 standard drinks     Types: 7 Standard drinks or equivalent per week     Comment: 4-5 drinks week     Drug use: No     Sexual activity: Yes     Partners: Male     Birth control/protection: Post-menopausal     Comment:    Other Topics Concern      Service No     Blood Transfusions No     Caffeine  Concern Yes     Occupational Exposure No     Hobby Hazards No     Sleep Concern No     Stress Concern No     Weight Concern No     Special Diet No     Exercise Yes     Seat Belt Yes     Self-Exams Yes         CC  Shamir Araujo MD  [address] [facility] [address]  [place] [address]    Thank you for allowing me to participate in the care of your patient.      Sincerely,     Jessica Way MD     [facility]

## 2022-07-23 ENCOUNTER — DOCUMENTATION ONLY (OUTPATIENT)
Dept: CARDIOLOGY | Facility: CLINIC | Age: 62
End: 2022-07-23

## 2022-07-23 DIAGNOSIS — I48.0 PAROXYSMAL ATRIAL FIBRILLATION (H): Primary | ICD-10-CM

## 2022-07-23 NOTE — PROGRESS NOTES
Please call the patient.  During our apt last week I asked her to decrease metoprolol XL from 37.5 mg bid to 25 mg bid.    Because the main issue of concern in her case is pauses (rather than AF with RVR), please ask her to only take Toprol XL 25 mg daily in the am.  This will make it even less likely that she will have a future symptomatic pause.     NARENDRA Mayberry

## 2022-07-25 RX ORDER — METOPROLOL SUCCINATE 25 MG/1
25 TABLET, EXTENDED RELEASE ORAL DAILY
Qty: 90 TABLET | Refills: 3 | Status: SHIPPED | OUTPATIENT
Start: 2022-07-25 | End: 2023-10-16

## 2022-07-25 NOTE — PROGRESS NOTES
Made pt aware that Dr Way would like pt to decrease the Metoprolol XL dose to 25 mg daily from twice daily. Discussed why the decrease made and Dr Way reason.  Pt stated understanding and escript updated. Simona

## 2022-08-05 ENCOUNTER — HOSPITAL ENCOUNTER (OUTPATIENT)
Dept: CARDIOLOGY | Facility: CLINIC | Age: 62
Discharge: HOME | End: 2022-08-05
Attending: INTERNAL MEDICINE | Admitting: INTERNAL MEDICINE
Payer: COMMERCIAL

## 2022-08-05 DIAGNOSIS — R06.09 DYSPNEA ON EXERTION: ICD-10-CM

## 2022-08-05 DIAGNOSIS — I48.92 PAROXYSMAL ATRIAL FLUTTER (H): ICD-10-CM

## 2022-08-05 LAB — LVEF ECHO: NORMAL

## 2022-08-05 PROCEDURE — 93306 TTE W/DOPPLER COMPLETE: CPT

## 2022-08-05 PROCEDURE — 93306 TTE W/DOPPLER COMPLETE: CPT | Mod: 26 | Performed by: INTERNAL MEDICINE

## 2022-08-15 ENCOUNTER — OFFICE VISIT (OUTPATIENT)
Dept: FAMILY MEDICINE | Facility: CLINIC | Age: 62
End: 2022-08-15

## 2022-08-15 VITALS
BODY MASS INDEX: 25.23 KG/M2 | TEMPERATURE: 98.1 F | HEIGHT: 64 IN | DIASTOLIC BLOOD PRESSURE: 82 MMHG | WEIGHT: 147.8 LBS | SYSTOLIC BLOOD PRESSURE: 138 MMHG | HEART RATE: 74 BPM | OXYGEN SATURATION: 98 %

## 2022-08-15 DIAGNOSIS — I48.92 ATRIAL FLUTTER, UNSPECIFIED TYPE (H): ICD-10-CM

## 2022-08-15 DIAGNOSIS — N63.0 LUMP OR MASS IN BREAST: Primary | ICD-10-CM

## 2022-08-15 PROCEDURE — 99214 OFFICE O/P EST MOD 30 MIN: CPT | Performed by: FAMILY MEDICINE

## 2022-08-15 NOTE — PROGRESS NOTES
"Assessment & Plan   Problem List Items Addressed This Visit        Circulatory    Atrial flutter (H)      Other Visit Diagnoses     Lump or mass in breast    -  Primary    Relevant Orders    Radiology Referral    Mammo diagnostic  digital (bilateral)*           1. Lump or mass in breast  Referral for further evaluation to radiology, breast center.  - Radiology Referral; Future  - Mammo diagnostic  digital (bilateral)*; Future    2. Atrial flutter, unspecified type (H)  Followed by cardiology         BMI:   Estimated body mass index is 25.37 kg/m  as calculated from the following:    Height as of this encounter: 1.626 m (5' 4\").    Weight as of this encounter: 67 kg (147 lb 12.8 oz).         FUTURE APPOINTMENTS:       - Follow-up visit pre results.    No follow-ups on file.    Constanza Johnson MD  [facility]    Subjective     Nursing Notes:   Rozina Florentino CMA  8/15/2022  7:52 AM  Addendum  Chief Complaint   Patient presents with     Breast Problem     Noticed small lump on right breast last week, no pain     Pre-visit Screening:  Immunizations:  up to date  Colonoscopy:  is up to date  Mammogram: is not up to date  Asthma Action Test/Plan:  NA  PHQ9:  NA  GAD7:  NA  Questioned patient about current smoking habits Pt. has never smoked.  Ok to leave detailed message on voice mail for today's visit only Yes, phone # [phone]           Gaby Aguilar is a 61 year old female who presents to clinic today for the following health issues   HPI     Breast lump in the past week, hasn't changed. Not tender.   FH--2 aunts on mothers side with breast cancer.        Review of Systems   Constitutional, HEENT, cardiovascular, pulmonary, gi and gu systems are negative, except as otherwise noted.      Objective    /82 (BP Location: Right arm, Patient Position: Sitting, Cuff Size: Adult Regular)   Pulse 74   Temp 98.1  F (36.7  C) (Temporal)   Ht 1.626 m (5' 4\")   Wt 67 kg (147 lb 12.8 oz)   LMP " 05/26/2010   SpO2 98%   BMI 25.37 kg/m    Body mass index is 25.37 kg/m .  Physical Exam   GENERAL: healthy, alert and no distress  MS: no gross musculoskeletal defects noted, no edema  NEURO: Normal strength and tone, mentation intact and speech normal  PSYCH: mentation appears normal, affect normal/bright  Bilateral breast exam: tiny superficial breast exam right breast upper inner quad, otherwise no masses. No mass left breast or axillae

## 2022-08-15 NOTE — NURSING NOTE
Chief Complaint   Patient presents with     Breast Problem     Noticed small lump on right breast last week, no pain     Pre-visit Screening:  Immunizations:  up to date  Colonoscopy:  is up to date  Mammogram: is not up to date  Asthma Action Test/Plan:  NA  PHQ9:  NA  GAD7:  NA  Questioned patient about current smoking habits Pt. has never smoked.  Ok to leave detailed message on voice mail for today's visit only Yes, phone # [phone]

## 2022-08-17 ENCOUNTER — HOSPITAL ENCOUNTER (OUTPATIENT)
Dept: MAMMOGRAPHY | Facility: CLINIC | Age: 62
Discharge: HOME | End: 2022-08-17
Attending: FAMILY MEDICINE
Payer: COMMERCIAL

## 2022-08-17 ENCOUNTER — HOSPITAL ENCOUNTER (OUTPATIENT)
Dept: ULTRASOUND IMAGING | Facility: CLINIC | Age: 62
Discharge: HOME | End: 2022-08-17
Attending: FAMILY MEDICINE
Payer: COMMERCIAL

## 2022-08-17 DIAGNOSIS — N63.0 LUMP OR MASS IN BREAST: ICD-10-CM

## 2022-08-17 PROCEDURE — 77062 BREAST TOMOSYNTHESIS BI: CPT

## 2022-08-17 PROCEDURE — 76642 ULTRASOUND BREAST LIMITED: CPT | Mod: RT

## 2022-10-22 ENCOUNTER — HEALTH MAINTENANCE LETTER (OUTPATIENT)
Age: 62
End: 2022-10-22

## 2023-01-12 NOTE — PROGRESS NOTES
SUBJECTIVE:   CC: Gaby is an 62 year old who presents for preventive health visit.   Patient has been advised of split billing requirements and indicates understanding: Yes  HPI    Here for a physical. She is doing well.  Due for fasting labs.  Still has a breast lump in right breast, inner upper quad. No changes. Had diagnostic mammogram in august.        Today's PHQ-2 Score:   PHQ-2 (  [employer]) 2023   Q1: Little interest or pleasure in doing things 0   Q2: Feeling down, depressed or hopeless 0   PHQ-2 Score 0   PHQ-2 Total Score (12-17 Years)- Positive if 3 or more points; Administer PHQ-A if positive -   Q1: Little interest or pleasure in doing things -   Q2: Feeling down, depressed or hopeless -   PHQ-2 Score -           Social History     Tobacco Use     Smoking status: Former     Packs/day: 0.50     Years: 6.00     Pack years: 3.00     Types: Cigarettes     Quit date: 3/26/1982     Years since quittin.8     Smokeless tobacco: Never   Substance Use Topics     Alcohol use: Yes     Alcohol/week: 7.0 standard drinks     Types: 7 Standard drinks or equivalent per week     No concerns    No flowsheet data found.    Reviewed orders with patient.  Reviewed health maintenance and updated orders accordingly - Yes  BP Readings from Last 3 Encounters:   23 126/82   08/15/22 138/82   22 137/80    Wt Readings from Last 3 Encounters:   23 67.9 kg (149 lb 12.8 oz)   08/15/22 67 kg (147 lb 12.8 oz)   22 67.1 kg (148 lb)                  Patient Active Problem List   Diagnosis     Allergic rhinitis     Health Care Home     ACP (advance care planning)     Diffuse cystic mastopathy     Atrophic vaginitis     Hx of atrial flutter     Atrial flutter (H)--followed by cardiology     Esophageal reflux     Localized osteoarthritis of hand     Pitted nails     Mass of upper inner quadrant of right breast     Past Surgical History:   Procedure Laterality Date     HC CORRECT BUNION,SIMPLE Right 1978      HC TOOTH EXTRACTION W/FORCEP  age 20     LAPAROSCOPIC HYSTERECTOMY TOTAL, BILATERAL SALPINGO-OOPHORECTOMY, COMBINED Bilateral 3/6/2019    Procedure: Single port total laparoscopic hysterectomy bilateral salpingo-oopherectomy;  Surgeon: Laisha Kathleen MD;  Location:  OR       Social History     Tobacco Use     Smoking status: Former     Packs/day: 0.50     Years: 6.00     Pack years: 3.00     Types: Cigarettes     Quit date: 3/26/1982     Years since quittin.8     Smokeless tobacco: Never   Substance Use Topics     Alcohol use: Yes     Alcohol/week: 7.0 standard drinks     Types: 7 Standard drinks or equivalent per week     Family History   Problem Relation Age of Onset     Cancer Mother         presacral cancer     Hypertension Mother      Hypertension Father      Prostate Cancer Father      Diabetes Maternal Grandmother      Breast Cancer Maternal Aunt      C.A.D. No family hx of      Cerebrovascular Disease No family hx of      Cancer - colorectal No family hx of          Current Outpatient Medications   Medication Sig Dispense Refill     acetaminophen (TYLENOL) 500 MG tablet Take 500-1,000 mg by mouth every 6 hours as needed for mild pain       Ascorbic Acid (VITAMIN C) 500 MG CAPS Unknown dose at this time       biotin 1000 MCG TABS tablet Take 1,000 mcg by mouth daily       Calcium Carbonate-Vitamin D (CALCIUM + D) 600-200 MG-UNIT per tablet Take 2 tablets by mouth daily        ibuprofen (ADVIL/MOTRIN) 200 MG tablet Take 400 mg by mouth every 4 hours as needed for mild pain       metoprolol succinate ER (TOPROL XL) 25 MG 24 hr tablet Take 1 tablet (25 mg) by mouth daily 90 tablet 3     Multiple Vitamins-Calcium (ONE-A-DAY WOMENS FORMULA) TABS Take 1 tablet by mouth daily.       UNABLE TO FIND MEDICATION NAME: [employer] Allegra       VITAMIN E NATURAL PO          Breast Cancer Screening:  Any new diagnosis of family breast, ovarian, or bowel cancer? no    FHS-7:   Breast CA Risk Assessment (FHS-7)  8/17/2022   Did any of your first-degree relatives have breast or ovarian cancer? No   Did any of your relatives have bilateral breast cancer? No   Did any man in your family have breast cancer? No   Did any woman in your family have breast and ovarian cancer? No   Did any woman in your family have breast cancer before age 50 y? No   Do you have 2 or more relatives with breast and/or ovarian cancer? Yes   Do you have 2 or more relatives with breast and/or bowel cancer? Yes     2 aunts with breast cancer, referral done to genetic counseling    Pertinent mammograms are reviewed under the imaging tab.    History of abnormal Pap smear: one abnl, but then normal but now has had hysterectomy     Reviewed and updated as needed this visit by clinical staff   Tobacco  Allergies   Problems             Reviewed and updated as needed this visit by Provider                 Past Medical History:   Diagnosis Date     Allergic rhinitis 5/27/2011     Problem list name updated by automated process. Provider to review     Arthritis     hands     Diffuse cystic mastopathy 12/27/2012     Hx of atrial flutter 12/14/2018     Symptomatic menopausal or female climacteric states 1/9/2008      Past Surgical History:   Procedure Laterality Date     HC CORRECT BUNION,SIMPLE Right 1978     HC TOOTH EXTRACTION W/FORCEP  age 20     LAPAROSCOPIC HYSTERECTOMY TOTAL, BILATERAL SALPINGO-OOPHORECTOMY, COMBINED Bilateral 3/6/2019    Procedure: Single port total laparoscopic hysterectomy bilateral salpingo-oopherectomy;  Surgeon: Laisha Kathleen MD;  Location:  OR       Review of Systems  CONSTITUTIONAL: NEGATIVE for fever, chills, change in weight  INTEGUMENTARY/SKIN: NEGATIVE for worrisome rashes, moles or lesions  EYES: NEGATIVE for vision changes or irritation  ENT: NEGATIVE for ear, mouth and throat problems  RESP: NEGATIVE for significant cough or SOB  BREAST: NEGATIVE for masses, tenderness or discharge  CV: NEGATIVE for chest pain,  "palpitations or peripheral edema  GI: NEGATIVE for nausea, abdominal pain, heartburn, or change in bowel habits  : NEGATIVE for unusual urinary or vaginal symptoms. No vaginal bleeding.  MUSCULOSKELETAL: NEGATIVE for significant arthralgias or myalgia  NEURO: NEGATIVE for weakness, dizziness or paresthesias  PSYCHIATRIC: NEGATIVE for changes in mood or affect      OBJECTIVE:   /82 (BP Location: Right arm, Patient Position: Sitting, Cuff Size: Adult Large)   Pulse 58   Temp 97.9  F (36.6  C) (Temporal)   Ht 1.626 m (5' 4\")   Wt 67.9 kg (149 lb 12.8 oz)   LMP 05/26/2010   SpO2 97%   BMI 25.71 kg/m    Physical Exam  GENERAL: healthy, alert and no distress  EYES: Eyes grossly normal to inspection, PERRL and conjunctivae and sclerae normal  HENT: ear canals and TM's normal, nose and mouth without ulcers or lesions  NECK: no adenopathy, no asymmetry, masses, or scars and thyroid normal to palpation  RESP: lungs clear to auscultation - no rales, rhonchi or wheezes  BREAST: normal without masses on left, right breast inner upper quad with small superficial mass, per patient no changes, tenderness or nipple discharge and no palpable axillary masses or adenopathy  CV: irregular, normal S1 S2, no S3 or S4, no murmur, click or rub, no peripheral edema and peripheral pulses strong  ABDOMEN: soft, nontender, no hepatosplenomegaly, no masses and bowel sounds normal  MS: no gross musculoskeletal defects noted, no edema  SKIN: no suspicious lesions or rashes  NEURO: Normal strength and tone, mentation intact and speech normal  PSYCH: mentation appears normal, affect normal/bright  Declined pelvic exam    Diagnostic Test Results:  Labs reviewed in Epic  No results found for any visits on 01/16/23.    ASSESSMENT/PLAN:   1. Encounter for routine history and physical exam in female patient    - VENOUS COLLECTION  - Lipid Panel ([facility])  - Basic Metabolic Panel ([facility])    2. Atrial flutter, unspecified type (H)  Followed by " "cardiology. She should contact them to discuss if she needs to be on a blood thinner or at least aspirin.    3. Family history of malignant neoplasm of breast  Referral for genetic counseling.  - Other Specialty Referral    4. Encounter for vaccination    - TDAP VACCINE (Adacel, Boostrix)  [[id number]]      5. Mass of upper inner quadrant of right breast  This was found to be benign, possibly a skin fold. Offered mammogram for followup she said no changes and she doesn't want to do this now, high deductible ins plan.  Patient has been advised of split billing requirements and indicates understanding: Yes      COUNSELING:  Reviewed preventive health counseling, as reflected in patient instructions       Regular exercise       Healthy diet/nutrition      BMI:   Estimated body mass index is 25.71 kg/m  as calculated from the following:    Height as of this encounter: 1.626 m (5' 4\").    Weight as of this encounter: 67.9 kg (149 lb 12.8 oz).         She reports that she quit smoking about 40 years ago. Her smoking use included cigarettes. She has a 3.00 pack-year smoking history. She has never used smokeless tobacco.      Constanza Johnson MD  [facility]  "

## 2023-01-16 ENCOUNTER — OFFICE VISIT (OUTPATIENT)
Dept: FAMILY MEDICINE | Facility: CLINIC | Age: 63
End: 2023-01-16

## 2023-01-16 VITALS
HEART RATE: 58 BPM | OXYGEN SATURATION: 97 % | DIASTOLIC BLOOD PRESSURE: 82 MMHG | BODY MASS INDEX: 25.57 KG/M2 | TEMPERATURE: 97.9 F | SYSTOLIC BLOOD PRESSURE: 126 MMHG | WEIGHT: 149.8 LBS | HEIGHT: 64 IN

## 2023-01-16 DIAGNOSIS — Z00.00 ENCOUNTER FOR ROUTINE HISTORY AND PHYSICAL EXAM IN FEMALE PATIENT: Primary | ICD-10-CM

## 2023-01-16 DIAGNOSIS — N63.12 MASS OF UPPER INNER QUADRANT OF RIGHT BREAST: ICD-10-CM

## 2023-01-16 DIAGNOSIS — I48.92 ATRIAL FLUTTER, UNSPECIFIED TYPE (H): ICD-10-CM

## 2023-01-16 DIAGNOSIS — Z80.3 FAMILY HISTORY OF MALIGNANT NEOPLASM OF BREAST: ICD-10-CM

## 2023-01-16 DIAGNOSIS — R73.09 HIGH GLUCOSE: ICD-10-CM

## 2023-01-16 DIAGNOSIS — Z23 ENCOUNTER FOR VACCINATION: ICD-10-CM

## 2023-01-16 LAB
BUN SERPL-MCNC: 12 MG/DL (ref 7–25)
BUN/CREATININE RATIO: 13.8 (ref 6–22)
CALCIUM SERPL-MCNC: 9.4 MG/DL (ref 8.6–10.3)
CHLORIDE SERPLBLD-SCNC: 100.8 MMOL/L (ref 98–110)
CHOLEST SERPL-MCNC: 206 MG/DL (ref 0–199)
CHOLEST/HDLC SERPL: 4 {RATIO} (ref 0–5)
CO2 SERPL-SCNC: 31.7 MMOL/L (ref 20–32)
CREAT SERPL-MCNC: 0.87 MG/DL (ref 0.6–1.3)
GLUCOSE SERPL-MCNC: 104 MG/DL (ref 60–99)
HBA1C MFR BLD: 5.1 % (ref 4–7)
HDLC SERPL-MCNC: 46 MG/DL (ref 40–150)
LDLC SERPL CALC-MCNC: 116 MG/DL (ref 0–130)
POTASSIUM SERPL-SCNC: 4.65 MMOL/L (ref 3.5–5.3)
SODIUM SERPL-SCNC: 138 MMOL/L (ref 135–146)
TRIGL SERPL-MCNC: 221 MG/DL (ref 0–149)

## 2023-01-16 PROCEDURE — 90715 TDAP VACCINE 7 YRS/> IM: CPT | Performed by: FAMILY MEDICINE

## 2023-01-16 PROCEDURE — 90471 IMMUNIZATION ADMIN: CPT | Performed by: FAMILY MEDICINE

## 2023-01-16 PROCEDURE — 80048 BASIC METABOLIC PNL TOTAL CA: CPT | Performed by: FAMILY MEDICINE

## 2023-01-16 PROCEDURE — 99396 PREV VISIT EST AGE 40-64: CPT | Mod: 25 | Performed by: FAMILY MEDICINE

## 2023-01-16 PROCEDURE — 36415 COLL VENOUS BLD VENIPUNCTURE: CPT | Performed by: FAMILY MEDICINE

## 2023-01-16 PROCEDURE — 80061 LIPID PANEL: CPT | Performed by: FAMILY MEDICINE

## 2023-01-16 PROCEDURE — 83036 HEMOGLOBIN GLYCOSYLATED A1C: CPT | Performed by: FAMILY MEDICINE

## 2023-01-16 NOTE — NURSING NOTE
Chief Complaint   Patient presents with     Physical     Fasting today, no concerns     Pre-visit Screening:  Immunizations:  not up to date - tdap due  Colonoscopy:  is up to date  Mammogram: is up to date  Asthma Action Test/Plan:  NA  PHQ9:  NA  GAD7:  NA  Questioned patient about current smoking habits Pt. quit smoking some time ago.  Ok to leave detailed message on voice mail for today's visit only Yes, phone # [phone]

## 2023-02-22 ENCOUNTER — OFFICE VISIT (OUTPATIENT)
Dept: FAMILY MEDICINE | Facility: CLINIC | Age: 63
End: 2023-02-22
Payer: COMMERCIAL

## 2023-02-22 DIAGNOSIS — D18.01 CHERRY ANGIOMA: ICD-10-CM

## 2023-02-22 DIAGNOSIS — L73.8 SEBACEOUS HYPERPLASIA OF FACE: ICD-10-CM

## 2023-02-22 DIAGNOSIS — L81.4 LENTIGINES: ICD-10-CM

## 2023-02-22 DIAGNOSIS — Z12.83 ENCOUNTER FOR SCREENING FOR MALIGNANT NEOPLASM OF SKIN: ICD-10-CM

## 2023-02-22 DIAGNOSIS — D22.9 MULTIPLE BENIGN NEVI: Primary | ICD-10-CM

## 2023-02-22 DIAGNOSIS — Z85.828 HISTORY OF BASAL CELL CARCINOMA: ICD-10-CM

## 2023-02-22 DIAGNOSIS — L82.1 SEBORRHEIC KERATOSES: ICD-10-CM

## 2023-02-22 PROCEDURE — 99203 OFFICE O/P NEW LOW 30 MIN: CPT | Performed by: NURSE PRACTITIONER

## 2023-02-22 NOTE — PROGRESS NOTES
[facility] Dermatology Note  Encounter Date: Feb 22, 2023  Office Visit     Reviewed patients past medical history and pertinent chart review prior to patients visit today.     Dermatology Problem List:  History of BCC on the left nasal ala 10/22 at outside clinic    ____________________________________________    Assessment & Plan:     # History of BCC on left nasal ala at outside clinic. Well healed scar without signs of recurrence.     # Benign skin findings including: sebaceous hyperplasia, seborrheic keratoses, cherry angioma, lentigines and benign nevi.   - No further intervention required. Patient to report changes.   - Patient reassured of the benign nature of these lesions.    # Lipoma. Benign, no further treatment needed. Discussed excision if patient is bothered by the lesion due to irritation or pain. Patient prefers to have the lesion removed and is aware of the risk of infection, scar, incomplete removal and recurrence. Patient declines treatment right now.     #Signs and Symptoms of non-melanoma skin cancer and ABCDEs of melanoma reviewed with patient. Patient encouraged to perform monthly self skin exams and educated on how to perform them. UV precautions reviewed with patient. Patient was asked about new or changing moles/lesions on body.     #Reviewed Sunscreen: Apply 20 minutes prior to going outdoors and reapply every two hours, when wet or sweating. We recommend using an SPF 30 or higher, and to use one that is water resistant.       Follow-up:  1 years for follow up full body skin exam, prn for new or changing lesions or new concerns    Noris Thorne, FERNANDEZ CNP on 2/22/2023 at 10:21 AM    ____________________________________________    CC: Skin Check    HPI:  Ms. Gaby Aguilar is a(n) 62 year old female who presents today as a new patient for a full body skin cancer screening. Patient has concerns today about an itchy spot in the groin and a lump on her right dorsal  hand. She is not overly bothered by the bump.     Patient is otherwise feeling well, without additional skin concerns.     Physical Exam:  Vitals: LMP 05/26/2010   SKIN: Total skin including the genitalia areas was performed. The exam included the head/face, neck, both arms, chest, back, abdomen, both legs, digits, mons pubis, genitals, buttock and nails.   -well healed scar on left nasal ala  -right dorsal hand, 1.5 cm skin colored movable soft subcutaneous nodule  -There are yellow oily papules with central dells on the face.    -several 1-2mm red dome shaped symmetric papules scattered on the trunk  -multiple tan/brown flat round macules and raised papules scattered throughout trunk, extremities and head. No worrisome features for malignancy noted on examination.  -scattered tan, homogenous macules scattered on sun exposed areas of trunk, extremities and face.   -scattered waxy, stuck on tan/brown papules and patches on the trunk and in right inguinal fold    - No other lesions of concern on areas examined.     Medications:  Current Outpatient Medications   Medication     acetaminophen (TYLENOL) 500 MG tablet     Ascorbic Acid (VITAMIN C) 500 MG CAPS     biotin 1000 MCG TABS tablet     Calcium Carbonate-Vitamin D (CALCIUM + D) 600-200 MG-UNIT per tablet     ibuprofen (ADVIL/MOTRIN) 200 MG tablet     metoprolol succinate ER (TOPROL XL) 25 MG 24 hr tablet     Multiple Vitamins-Calcium (ONE-A-DAY WOMENS FORMULA) TABS     UNABLE TO FIND     VITAMIN E NATURAL PO     No current facility-administered medications for this visit.      Past Medical History:   Patient Active Problem List   Diagnosis     Allergic rhinitis     Health Care Home     ACP (advance care planning)     Diffuse cystic mastopathy     Atrophic vaginitis     Hx of atrial flutter     Atrial flutter (H)--followed by cardiology     Esophageal reflux     Localized osteoarthritis of hand     Pitted nails     Mass of upper inner quadrant of right breast      Past Medical History:   Diagnosis Date     Allergic rhinitis 05/27/2011     Problem list name updated by automated process. Provider to review     Arthritis     hands     Diffuse cystic mastopathy 12/27/2012     History of basal cell carcinoma      Hx of atrial flutter 12/14/2018     Symptomatic menopausal or female climacteric states 01/09/2008       CC No referring provider defined for this encounter. on close of this encounter.

## 2023-02-22 NOTE — LETTER
2/22/2023         RE: Gaby Aguilar  [address] [place] [address]        Dear Colleague,    Thank you for referring your patient, Gaby Aguilar, to the [facility]. Please see a copy of my visit note below.    [facility] Dermatology Note  Encounter Date: Feb 22, 2023  Office Visit     Reviewed patients past medical history and pertinent chart review prior to patients visit today.     Dermatology Problem List:  History of BCC on the left nasal ala 10/22 at outside clinic    ____________________________________________    Assessment & Plan:     # History of BCC on left nasal ala at outside clinic. Well healed scar without signs of recurrence.     # Benign skin findings including: sebaceous hyperplasia, seborrheic keratoses, cherry angioma, lentigines and benign nevi.   - No further intervention required. Patient to report changes.   - Patient reassured of the benign nature of these lesions.    # Lipoma. Benign, no further treatment needed. Discussed excision if patient is bothered by the lesion due to irritation or pain. Patient prefers to have the lesion removed and is aware of the risk of infection, scar, incomplete removal and recurrence. Patient declines treatment right now.     #Signs and Symptoms of non-melanoma skin cancer and ABCDEs of melanoma reviewed with patient. Patient encouraged to perform monthly self skin exams and educated on how to perform them. UV precautions reviewed with patient. Patient was asked about new or changing moles/lesions on body.     #Reviewed Sunscreen: Apply 20 minutes prior to going outdoors and reapply every two hours, when wet or sweating. We recommend using an SPF 30 or higher, and to use one that is water resistant.       Follow-up:  1 years for follow up full body skin exam, prn for new or changing lesions or new concerns    Noris Thorne, FERNANDEZ CNP on 2/22/2023 at 10:21  AM    ____________________________________________    CC: Skin Check    HPI:  Ms. Gaby Aguilar is a(n) 62 year old female who presents today as a new patient for a full body skin cancer screening. Patient has concerns today about an itchy spot in the groin and a lump on her right dorsal hand. She is not overly bothered by the bump.     Patient is otherwise feeling well, without additional skin concerns.     Physical Exam:  Vitals: LMP 05/26/2010   SKIN: Total skin including the genitalia areas was performed. The exam included the head/face, neck, both arms, chest, back, abdomen, both legs, digits, mons pubis, genitals, buttock and nails.   -well healed scar on left nasal ala  -right dorsal hand, 1.5 cm skin colored movable soft subcutaneous nodule  -There are yellow oily papules with central dells on the face.    -several 1-2mm red dome shaped symmetric papules scattered on the trunk  -multiple tan/brown flat round macules and raised papules scattered throughout trunk, extremities and head. No worrisome features for malignancy noted on examination.  -scattered tan, homogenous macules scattered on sun exposed areas of trunk, extremities and face.   -scattered waxy, stuck on tan/brown papules and patches on the trunk and in right inguinal fold    - No other lesions of concern on areas examined.     Medications:  Current Outpatient Medications   Medication     acetaminophen (TYLENOL) 500 MG tablet     Ascorbic Acid (VITAMIN C) 500 MG CAPS     biotin 1000 MCG TABS tablet     Calcium Carbonate-Vitamin D (CALCIUM + D) 600-200 MG-UNIT per tablet     ibuprofen (ADVIL/MOTRIN) 200 MG tablet     metoprolol succinate ER (TOPROL XL) 25 MG 24 hr tablet     Multiple Vitamins-Calcium (ONE-A-DAY WOMENS FORMULA) TABS     UNABLE TO FIND     VITAMIN E NATURAL PO     No current facility-administered medications for this visit.      Past Medical History:   Patient Active Problem List   Diagnosis     Allergic rhinitis     Health Care  Home     ACP (advance care planning)     Diffuse cystic mastopathy     Atrophic vaginitis     Hx of atrial flutter     Atrial flutter (H)--followed by cardiology     Esophageal reflux     Localized osteoarthritis of hand     Pitted nails     Mass of upper inner quadrant of right breast     Past Medical History:   Diagnosis Date     Allergic rhinitis 05/27/2011     Problem list name updated by automated process. Provider to review     Arthritis     hands     Diffuse cystic mastopathy 12/27/2012     History of basal cell carcinoma      Hx of atrial flutter 12/14/2018     Symptomatic menopausal or female climacteric states 01/09/2008       CC No referring provider defined for this encounter. on close of this encounter.      Again, thank you for allowing me to participate in the care of your patient.        Sincerely,        FERNANDEZ Gonzalez CNP

## 2023-06-26 ENCOUNTER — TRANSFERRED RECORDS (OUTPATIENT)
Dept: HEALTH INFORMATION MANAGEMENT | Facility: CLINIC | Age: 63
End: 2023-06-26

## 2023-06-26 ENCOUNTER — HOSPITAL ENCOUNTER (OUTPATIENT)
Dept: CARDIOLOGY | Facility: CLINIC | Age: 63
Discharge: HOME | End: 2023-06-26
Attending: INTERNAL MEDICINE | Admitting: INTERNAL MEDICINE
Payer: COMMERCIAL

## 2023-06-26 DIAGNOSIS — I48.92 PAROXYSMAL ATRIAL FLUTTER (H): ICD-10-CM

## 2023-06-26 DIAGNOSIS — I49.5 SSS (SICK SINUS SYNDROME) (H): ICD-10-CM

## 2023-06-26 PROCEDURE — 93244 EXT ECG>48HR<7D REV&INTERPJ: CPT | Performed by: INTERNAL MEDICINE

## 2023-06-26 PROCEDURE — 93242 EXT ECG>48HR<7D RECORDING: CPT

## 2023-08-13 NOTE — PROGRESS NOTES
Electrophysiology Clinic Progress Note    Gaby Aguilar MRN# [id number]   YOB: 1960 Age: 62 year old     Primary cardiology team: Dr. Araujo (general), Dr. Way (EP)         Assessment and Plan     In summary, Gaby Aguilar presents today for an annual follow up visit. She has a history of moderate tach-zach syndrome with minor symptoms. Our concern is that if left untreated, she is at risk for syncope from bradycardia, or a cardiomyopathy from tachycardia. We discussed either AF ablation or PPM placement today. She would like to pursue ablation.     Start Eliquis 5 mg BID x 3-4 weeks prior to ablation. She will need to continue this for at least 2 months post-procedure. Plan for Chest CT prior, consented for YVONNE during procedure, if needed.  We discussed the risks, benefits and indications of proceeding with an electrophysiology study and pulmonary vein isolation/atrial fibrillation ablation and atrial flutter ablation including but not limited to use of anesthesia (including intubation and bladder catheter), peripheral vessel injury, discomfort, bruising, bleeding, esophageal injury, diaphragmatic injury, cardiac puncture and/or tamponade requiring emergency treatment, pulmonary vein stenosis requiring intervention, and stroke/TIA. We reviewed that additional procedures may be required.  We also briefly discussed post-procedural restrictions and post-procedural discomfort.  The patient voiced understanding and is willing to proceed.  A consent form will be signed by the procedural physician.    RTC ~4 weeks post-procedure.     Reviewed with Dr. Way.     June Christian PA-C  [facility]         History of Presenting Illness     Gaby Aguilar is a pleasant 62 year old patient with a pertinent history of the following -   PAF and PAFL. Minimally symptomatic, historically. Has not been anticoagulated for a [id number] of 0.   Sick sinus syndrome with conversion pauses.  "    Today, I am meeting Gaby who is very pleasant. Patient denies chest pain, shortness of breath, PND, orthopnea, edema, claudication, palpitations, near syncope or syncope. Recent Zio monitor showed PAF/PAFL at an 8% burden, longest episode was ~3 hours, there were episodes of RVR as well as conversion pauses up to 3.2 seconds. Overall average HR was 65 bpm. This is similar to last year's zio monitor results.   BP today in clinic is elevated, it is controlled at home, however.   In the past, the patient has tested negative for sleep apnea, does not drink excess alcohol and does not have family history of AF. Normal LVEF (8/2022 TTE).          Review of Systems     12-pt ROS is negative except for as noted in the HPI.          Physical Exam     Vitals: BP (!) 150/90 (BP Location: Right arm, Patient Position: Sitting, Cuff Size: Adult Regular)   Pulse 62   Ht 1.626 m (5' 4\")   Wt 68.5 kg (151 lb)   LMP 05/26/2010   SpO2 98%   BMI 25.92 kg/m    Wt Readings from Last 10 Encounters:   08/14/23 68.5 kg (151 lb)   01/16/23 67.9 kg (149 lb 12.8 oz)   08/15/22 67 kg (147 lb 12.8 oz)   07/21/22 67.1 kg (148 lb)   06/02/22 68 kg (150 lb)   02/18/21 64 kg (141 lb 3.2 oz)   02/11/20 60.8 kg (134 lb)   11/20/19 63.1 kg (139 lb 3.2 oz)   06/12/19 65.2 kg (143 lb 11.2 oz)   03/06/19 64.4 kg (142 lb)       Constitutional:  Patient is pleasant, alert, cooperative, and in NAD.  HEENT:  NCAT. PERRLA. EOM's intact.   Neck:  CVP appears normal. No carotid bruits.   Pulmonary: Normal respiratory effort. CTAB.   Cardiac: RRR, normal S1/S2, no S3/S4, no murmur or rub.   Abdomen:  Non-tender abdomen, no hepatosplenomegaly appreciated.   Vascular: Pulses in the upper and lower extremities are 2+ and equal bilaterally.  Extremities: No edema, erythema, cyanosis or tenderness appreciated.  Skin:  No rashes or lesions appreciated.   Neurological:  No gross motor or sensory deficits.   Psych: Appropriate affect.          Data   Labs " reviewed:  Recent Labs   Lab Test 01/16/23  0000 05/23/21  0939 02/18/21  1139 02/18/21  0947 06/12/19  0914 12/07/18  1725    109* 110  --  98  --    HDL 46 45* 51  --  44*  --    NHDL  --  140*  --   --  139*  --    CHOL 206* 185 207*  --  183  --    TRIG 221* 155* 228*  --  207*  --    TSH  --  1.30  --  1.95  --  1.58       Lab Results   Component Value Date    WBC 5.3 05/23/2021    RBC 4.12 05/23/2021    HGB 13.3 05/23/2021    HCT 39.4 05/23/2021    MCV 96 05/23/2021    MCH 32.3 05/23/2021    MCHC 33.8 05/23/2021    RDW 12.3 05/23/2021     05/23/2021       Lab Results   Component Value Date    .0 01/16/2023    POTASSIUM 4.65 01/16/2023    CHLORIDE 100.8 01/16/2023    CO2 31.7 01/16/2023    ANIONGAP 3 05/23/2021     (A) 01/16/2023    BUN 12 01/16/2023    BUN 13.8 01/16/2023    CR 0.87 01/16/2023    GFRESTIMATED 86 05/23/2021    GFRESTBLACK >90 05/23/2021    VINH 9.4 01/16/2023      Lab Results   Component Value Date    AST 15 11/09/2016    ALT 16 11/09/2016       Lab Results   Component Value Date    A1C 5.1 01/16/2023       No results found for: INR         Problem List     Patient Active Problem List   Diagnosis    Allergic rhinitis    Health Care Home    ACP (advance care planning)    Diffuse cystic mastopathy    Atrophic vaginitis    Hx of atrial flutter    Atrial flutter (H)--followed by cardiology    Esophageal reflux    Localized osteoarthritis of hand    Pitted nails    Mass of upper inner quadrant of right breast            Medications     Current Outpatient Medications   Medication Sig Dispense Refill    acetaminophen (TYLENOL) 500 MG tablet Take 500-1,000 mg by mouth every 6 hours as needed for mild pain      Ascorbic Acid (VITAMIN C) 500 MG CAPS Unknown dose at this time      biotin 1000 MCG TABS tablet Take 1,000 mcg by mouth daily      Calcium Carbonate-Vitamin D (CALCIUM + D) 600-200 MG-UNIT per tablet Take 2 tablets by mouth daily       ibuprofen (ADVIL/MOTRIN) 200 MG  tablet Take 400 mg by mouth every 4 hours as needed for mild pain      metoprolol succinate ER (TOPROL XL) 25 MG 24 hr tablet Take 1 tablet (25 mg) by mouth daily 90 tablet 3    Multiple Vitamins-Calcium (ONE-A-DAY WOMENS FORMULA) TABS Take 1 tablet by mouth daily.      UNABLE TO FIND MEDICATION NAME: [employer] Allegra      VITAMIN E NATURAL PO               Past Medical History     Past Medical History:   Diagnosis Date    Allergic rhinitis 2011     Problem list name updated by automated process. Provider to review    Arthritis     hands    Diffuse cystic mastopathy 2012    History of basal cell carcinoma     Hx of atrial flutter 2018    Symptomatic menopausal or female climacteric states 2008     Past Surgical History:   Procedure Laterality Date    HC CORRECT BUNION,SIMPLE Right     HC TOOTH EXTRACTION W/FORCEP  age 20    LAPAROSCOPIC HYSTERECTOMY TOTAL, BILATERAL SALPINGO-OOPHORECTOMY, COMBINED Bilateral 3/6/2019    Procedure: Single port total laparoscopic hysterectomy bilateral salpingo-oopherectomy;  Surgeon: Laisha Kathleen MD;  Location: [facility] OR     Family History   Problem Relation Age of Onset    Cancer Mother         presacral cancer    Hypertension Mother     Hypertension Father     Prostate Cancer Father     Diabetes Maternal Grandmother     Breast Cancer Maternal Aunt     C.A.D. 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   Tobacco Use    Smoking status: Former     Packs/day: 0.50     Years: 6.00     Pack years: 3.00     Types: Cigarettes     Quit date: 3/26/1982     Years since quittin.4    Smokeless tobacco: Never   Substance and  Sexual Activity    Alcohol use: Yes     Alcohol/week: 7.0 - 9.0 standard drinks of alcohol     Types: 7 - 9 Standard drinks or equivalent per week    Drug use: No    Sexual activity: Yes     Partners: Male     Birth control/protection: Post-menopausal     Comment:    Other Topics Concern     Service No    Blood Transfusions No    Caffeine Concern Yes    Occupational Exposure No    Hobby Hazards No    Sleep Concern No    Stress Concern No    Weight Concern No    Special Diet No    Back Care Not Asked    Exercise Yes    Bike Helmet Not Asked    Seat Belt Yes    Self-Exams Yes   Social History Narrative    Not on file     Social Determinants of Health     Financial Resource Strain: Not on file   Food Insecurity: Not on file   Transportation Needs: Not on file   Physical Activity: Not on file   Stress: Not on file   Social Connections: Not on file   Intimate Partner Violence: Not on file   Housing Stability: Not on file            Allergies   Sulfa antibiotics    Today's clinic visit entailed:  Review of the result(s) of each unique test - zio monitor, echocardiogram, EKG  Prescription drug management  I spent a total of 30 minutes on the day of the visit.   Time spent by me doing chart review, history and exam, documentation and further activities per the note  Provider  Link to MDM Help Grid     The level of medical decision making during this visit was of moderate complexity.

## 2023-08-14 ENCOUNTER — OFFICE VISIT (OUTPATIENT)
Dept: CARDIOLOGY | Facility: CLINIC | Age: 63
End: 2023-08-14
Payer: COMMERCIAL

## 2023-08-14 VITALS
OXYGEN SATURATION: 98 % | HEIGHT: 64 IN | HEART RATE: 62 BPM | WEIGHT: 151 LBS | BODY MASS INDEX: 25.78 KG/M2 | DIASTOLIC BLOOD PRESSURE: 90 MMHG | SYSTOLIC BLOOD PRESSURE: 150 MMHG

## 2023-08-14 DIAGNOSIS — I48.0 PAF (PAROXYSMAL ATRIAL FIBRILLATION) (H): Primary | ICD-10-CM

## 2023-08-14 DIAGNOSIS — I48.92 PAROXYSMAL ATRIAL FLUTTER (H): ICD-10-CM

## 2023-08-14 PROCEDURE — 99214 OFFICE O/P EST MOD 30 MIN: CPT | Performed by: PHYSICIAN ASSISTANT

## 2023-08-14 NOTE — LETTER
8/14/2023    Constanza Johnson MD  [address]  [facility] [address]    RE: Gaby Aguilar       Dear Colleague,     I had the pleasure of seeing Gaby Aguilar in the [facility].    Electrophysiology Clinic Progress Note    Gaby Aguilar MRN# [id number]   YOB: 1960 Age: 62 year old     Primary cardiology team: Dr. Araujo (general), Dr. Way (EP)         Assessment and Plan     In summary, Gaby Aguilar presents today for an annual follow up visit. She has a history of moderate tach-zach syndrome with minor symptoms. Our concern is that if left untreated, she is at risk for syncope from bradycardia, or a cardiomyopathy from tachycardia. We discussed either AF ablation or PPM placement today. She would like to pursue ablation.     Start Eliquis 5 mg BID x 3-4 weeks prior to ablation. She will need to continue this for at least 2 months post-procedure. Plan for Chest CT prior, consented for YVONNE during procedure, if needed.  We discussed the risks, benefits and indications of proceeding with an electrophysiology study and pulmonary vein isolation/atrial fibrillation ablation and atrial flutter ablation including but not limited to use of anesthesia (including intubation and bladder catheter), peripheral vessel injury, discomfort, bruising, bleeding, esophageal injury, diaphragmatic injury, cardiac puncture and/or tamponade requiring emergency treatment, pulmonary vein stenosis requiring intervention, and stroke/TIA. We reviewed that additional procedures may be required.  We also briefly discussed post-procedural restrictions and post-procedural discomfort.  The patient voiced understanding and is willing to proceed.  A consent form will be signed by the procedural physician.    RTC ~4 weeks post-procedure.     Reviewed with Dr. Way.     June Christian PA-C  [facility]         History of Presenting Illness     Gaby Aguilar is a pleasant 62 year old  "patient with a pertinent history of the following -   PAF and PAFL. Minimally symptomatic, historically. Has not been anticoagulated for a [id number] of 0.   Sick sinus syndrome with conversion pauses.     Today, I am meeting Gaby who is very pleasant. Patient denies chest pain, shortness of breath, PND, orthopnea, edema, claudication, palpitations, near syncope or syncope. Recent Zio monitor showed PAF/PAFL at an 8% burden, longest episode was ~3 hours, there were episodes of RVR as well as conversion pauses up to 3.2 seconds. Overall average HR was 65 bpm. This is similar to last year's zio monitor results.   BP today in clinic is elevated, it is controlled at home, however.   In the past, the patient has tested negative for sleep apnea, does not drink excess alcohol and does not have family history of AF. Normal LVEF (8/2022 TTE).          Review of Systems     12-pt ROS is negative except for as noted in the HPI.          Physical Exam     Vitals: BP (!) 150/90 (BP Location: Right arm, Patient Position: Sitting, Cuff Size: Adult Regular)   Pulse 62   Ht 1.626 m (5' 4\")   Wt 68.5 kg (151 lb)   LMP 05/26/2010   SpO2 98%   BMI 25.92 kg/m    Wt Readings from Last 10 Encounters:   08/14/23 68.5 kg (151 lb)   01/16/23 67.9 kg (149 lb 12.8 oz)   08/15/22 67 kg (147 lb 12.8 oz)   07/21/22 67.1 kg (148 lb)   06/02/22 68 kg (150 lb)   02/18/21 64 kg (141 lb 3.2 oz)   02/11/20 60.8 kg (134 lb)   11/20/19 63.1 kg (139 lb 3.2 oz)   06/12/19 65.2 kg (143 lb 11.2 oz)   03/06/19 64.4 kg (142 lb)       Constitutional:  Patient is pleasant, alert, cooperative, and in NAD.  HEENT:  NCAT. PERRLA. EOM's intact.   Neck:  CVP appears normal. No carotid bruits.   Pulmonary: Normal respiratory effort. CTAB.   Cardiac: RRR, normal S1/S2, no S3/S4, no murmur or rub.   Abdomen:  Non-tender abdomen, no hepatosplenomegaly appreciated.   Vascular: Pulses in the upper and lower extremities are 2+ and equal bilaterally.  Extremities: No " edema, erythema, cyanosis or tenderness appreciated.  Skin:  No rashes or lesions appreciated.   Neurological:  No gross motor or sensory deficits.   Psych: Appropriate affect.          Data   Labs reviewed:  Recent Labs   Lab Test 01/16/23  0000 05/23/21  0939 02/18/21  1139 02/18/21  0947 06/12/19  0914 12/07/18  1725    109* 110  --  98  --    HDL 46 45* 51  --  44*  --    NHDL  --  140*  --   --  139*  --    CHOL 206* 185 207*  --  183  --    TRIG 221* 155* 228*  --  207*  --    TSH  --  1.30  --  1.95  --  1.58       Lab Results   Component Value Date    WBC 5.3 05/23/2021    RBC 4.12 05/23/2021    HGB 13.3 05/23/2021    HCT 39.4 05/23/2021    MCV 96 05/23/2021    MCH 32.3 05/23/2021    MCHC 33.8 05/23/2021    RDW 12.3 05/23/2021     05/23/2021       Lab Results   Component Value Date    .0 01/16/2023    POTASSIUM 4.65 01/16/2023    CHLORIDE 100.8 01/16/2023    CO2 31.7 01/16/2023    ANIONGAP 3 05/23/2021     (A) 01/16/2023    BUN 12 01/16/2023    BUN 13.8 01/16/2023    CR 0.87 01/16/2023    GFRESTIMATED 86 05/23/2021    GFRESTBLACK >90 05/23/2021    VINH 9.4 01/16/2023      Lab Results   Component Value Date    AST 15 11/09/2016    ALT 16 11/09/2016       Lab Results   Component Value Date    A1C 5.1 01/16/2023       No results found for: INR         Problem List     Patient Active Problem List   Diagnosis    Allergic rhinitis    Health Care Home    ACP (advance care planning)    Diffuse cystic mastopathy    Atrophic vaginitis    Hx of atrial flutter    Atrial flutter (H)--followed by cardiology    Esophageal reflux    Localized osteoarthritis of hand    Pitted nails    Mass of upper inner quadrant of right breast            Medications     Current Outpatient Medications   Medication Sig Dispense Refill    acetaminophen (TYLENOL) 500 MG tablet Take 500-1,000 mg by mouth every 6 hours as needed for mild pain      Ascorbic Acid (VITAMIN C) 500 MG CAPS Unknown dose at this time       biotin 1000 MCG TABS tablet Take 1,000 mcg by mouth daily      Calcium Carbonate-Vitamin D (CALCIUM + D) 600-200 MG-UNIT per tablet Take 2 tablets by mouth daily       ibuprofen (ADVIL/MOTRIN) 200 MG tablet Take 400 mg by mouth every 4 hours as needed for mild pain      metoprolol succinate ER (TOPROL XL) 25 MG 24 hr tablet Take 1 tablet (25 mg) by mouth daily 90 tablet 3    Multiple Vitamins-Calcium (ONE-A-DAY WOMENS FORMULA) TABS Take 1 tablet by mouth daily.      UNABLE TO FIND MEDICATION NAME: [employer] Allegra      VITAMIN E NATURAL PO               Past Medical History     Past Medical History:   Diagnosis Date    Allergic rhinitis 05/27/2011     Problem list name updated by automated process. Provider to review    Arthritis     hands    Diffuse cystic mastopathy 12/27/2012    History of basal cell carcinoma     Hx of atrial flutter 12/14/2018    Symptomatic menopausal or female climacteric states 01/09/2008     Past Surgical History:   Procedure Laterality Date    HC CORRECT BUNION,SIMPLE Right 1978    HC TOOTH EXTRACTION W/FORCEP  age 20    LAPAROSCOPIC HYSTERECTOMY TOTAL, BILATERAL SALPINGO-OOPHORECTOMY, COMBINED Bilateral 3/6/2019    Procedure: Single port total laparoscopic hysterectomy bilateral salpingo-oopherectomy;  Surgeon: Laisha Kathleen MD;  Location: [facility] OR     Family History   Problem Relation Age of Onset    Cancer Mother         presacral cancer    Hypertension Mother     Hypertension Father     Prostate Cancer Father     Diabetes Maternal Grandmother     Breast Cancer Maternal Aunt     C.A.D. 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   Tobacco Use     Smoking status: Former     Packs/day: 0.50     Years: 6.00     Pack years: 3.00     Types: Cigarettes     Quit date: 3/26/1982     Years since quittin.4    Smokeless tobacco: Never   Substance and Sexual Activity    Alcohol use: Yes     Alcohol/week: 7.0 - 9.0 standard drinks of alcohol     Types: 7 - 9 Standard drinks or equivalent per week    Drug use: No    Sexual activity: Yes     Partners: Male     Birth control/protection: Post-menopausal     Comment:    Other Topics Concern     Service No    Blood Transfusions No    Caffeine Concern Yes    Occupational Exposure No    Hobby Hazards No    Sleep Concern No    Stress Concern No    Weight Concern No    Special Diet No    Back Care Not Asked    Exercise Yes    Bike Helmet Not Asked    Seat Belt Yes    Self-Exams Yes   Social History Narrative    Not on file     Social Determinants of Health     Financial Resource Strain: Not on file   Food Insecurity: Not on file   Transportation Needs: Not on file   Physical Activity: Not on file   Stress: Not on file   Social Connections: Not on file   Intimate Partner Violence: Not on file   Housing Stability: Not on file            Allergies   Sulfa antibiotics    Today's clinic visit entailed:  Review of the result(s) of each unique test - zio monitor, echocardiogram, EKG  Prescription drug management  I spent a total of 30 minutes on the day of the visit.   Time spent by me doing chart review, history and exam, documentation and further activities per the note  Provider  Link to [facility] Help Grid     The level of medical decision making during this visit was of moderate complexity.      Thank you for allowing me to participate in the care of your patient.      Sincerely,     June Christian PA-C     [facility]  cc:   No referring provider defined for this encounter.

## 2023-08-16 ENCOUNTER — TELEPHONE (OUTPATIENT)
Dept: CARDIOLOGY | Facility: CLINIC | Age: 63
End: 2023-08-16
Payer: COMMERCIAL

## 2023-08-16 DIAGNOSIS — I48.0 PAROXYSMAL ATRIAL FIBRILLATION (H): Primary | ICD-10-CM

## 2023-08-16 NOTE — TELEPHONE ENCOUNTER
8/16/23 Msg recd from June Christian, June Stein, Jessica Briggs MD; BRENDA Dawkins [facility] Heart Ep Nurse  Thanks! Team, can you please move this forward? Start Eliquis x 3 weeks then ablation with CTA a couple days prior.  Of note, I said she should see me four weeks post-procedure; I meant one :)    Spoke w pt who stated she understands the plan.She stated she has never been on Eliquis. Will apply free 30 day free card. Given Afib RN phone #    Explained that Reshma from scheduling will call to set up CT , Ablation and follow up appts. Reminded her to count 3 weeks back from Ablation date to start Eliquis    Pt voiced understanding and agreement with plan.     Message sent to scheduling to contact pt     Nghia 1240 pm

## 2023-08-24 ENCOUNTER — HOSPITAL ENCOUNTER (OUTPATIENT)
Dept: MAMMOGRAPHY | Facility: CLINIC | Age: 63
Discharge: HOME | End: 2023-08-24
Attending: FAMILY MEDICINE | Admitting: FAMILY MEDICINE
Payer: COMMERCIAL

## 2023-08-24 DIAGNOSIS — Z12.31 VISIT FOR SCREENING MAMMOGRAM: ICD-10-CM

## 2023-08-24 PROCEDURE — 77067 SCR MAMMO BI INCL CAD: CPT

## 2023-09-28 ENCOUNTER — MYC MEDICAL ADVICE (OUTPATIENT)
Dept: CARDIOLOGY | Facility: CLINIC | Age: 63
End: 2023-09-28

## 2023-09-28 ENCOUNTER — OFFICE VISIT (OUTPATIENT)
Dept: CARDIOLOGY | Facility: CLINIC | Age: 63
End: 2023-09-28
Payer: COMMERCIAL

## 2023-09-28 VITALS
HEIGHT: 64 IN | BODY MASS INDEX: 26.12 KG/M2 | DIASTOLIC BLOOD PRESSURE: 68 MMHG | HEART RATE: 51 BPM | SYSTOLIC BLOOD PRESSURE: 130 MMHG | WEIGHT: 153 LBS

## 2023-09-28 DIAGNOSIS — I48.0 PAROXYSMAL ATRIAL FIBRILLATION (H): ICD-10-CM

## 2023-09-28 PROCEDURE — 99215 OFFICE O/P EST HI 40 MIN: CPT | Performed by: NURSE PRACTITIONER

## 2023-09-28 NOTE — PROGRESS NOTES
Electrophysiology Clinic Progress Note  Gaby Aguilar MRN# [id number]   YOB: 1960 Age: 63 year old     Primary cardiologist: Dr. Araujo (general) and Dr. Way (EP)    Reason for visit: Atrial fibrillation and tachybrady syndrome    History of presenting illness:    Gaby Aguilar is a pleasant 63 year old patient with past medical history significant for:    Paroxysmal atrial fibrillation and paroxysmal atrial flutter  [id number] Score 1  Asymptomatic moderate tachy-zach syndrome: conversion pauses on monitor   GERD    In 7/2022 she evaluated by Dr. Way every Zio patch revealed paroxysmal atrial fibrillation with a 10% burden and upon termination of atrial arrhythmias that she had sinus pauses up to 3.5 seconds and some while awake.  It was recommended that her beta-blocker be decreased from 37.5 mg twice daily of Toprol-XL to 25 mg daily and to alert us if she is having any concerns for syncope or presyncope.     She had a repeat monitor and 6/2023 that did reveal ongoing paroxysms of atrial fibrillation/atrial flutter with an 8% burden.  She again had sinus conversion pauses up to 3.2 seconds.  Dr. Way reviewed the findings and requested that we offer the patient either an atrial fibrillation ablation versus a permanent pacemaker to allow for A-fib control with RVR.  Unfortunately, if A-fib is left untreated she would be at risk for syncope from bradycardia plus or cardiomyopathy from tachycardia.    She was seen in follow-up on 8/14/2023 by June Christian PA-C and they discussed proceeding with a pacemaker versus atrial fibrillation ablation.  The patient elected for the latter.  It was recommended that she start Eliquis 5 mg twice daily approximately 3 weeks prior to the procedure and will need to continue 2 months post.  Today we had an ongoing discussion regarding the risk and benefits of the procedure as below.    Diagnotic studies:  Zio Patch (6/26-7/3/2023): Paroxysmal atrial  fibrillation/atrial flutter with an 8% burden.  Longest episode lasting 2 hours and 56 minutes with episodes of RVR.  Pauses up to 3.2 seconds upon AF conversion.  Zio Patch (6/2022): Paroxysmal atrial fibrillation and atrial flutter with 10% burden longest event lasting 1 hour and 41 minutes. Upon termination of atrial arrhythmias she had sinus pauses up to 3.5 seconds (some while awake)  Echocardiogram (8/2022): LVEF of 55 to 60% without wall motion abnormalities.  Trace to mild MR and TR.          Assessment and Plan:     ASSESSMENT:    Paroxysmal atrial arrhythmias with conversion pauses  [id number] score of 1 (gender)  Recently started on Eliquis on 8/14/2023    Asymptomatic tachybradycardia syndrome  Patient is asymptomatic with atrial fibrillation as well as with conversion pauses    PLAN:     CTa as scheduled  Nothing to eat after midnight  Clear liquids up to 2 hours prior stop 6:30 am  Check in at 8:30 am at 10/3   and someone to stay with you post procedure  Continue Eliquis up to the procedure  Hold Eliquis the am of the procedure unless in atrial fibrillation then take am dose. Please bring dose of Eliquis to the procedure.  Please follow up 1 week post procedure.    We discussed the risks, benefits and indications of proceeding with an electrophysiology study and pulmonary vein isolation/atrial fibrillation ablation, including but not limited to use of anesthesia (including intubation), peripheral vessel injury, discomfort, bruising, bleeding, esophageal injury, diaphragmatic injury, cardiac puncture and/or tamponade requiring emergency treatment, pulmonary vein stenosis requiring intervention, and stroke/TIA. We reviewed that additional procedures may be required.  We also briefly discussed post-procedural restrictions and post-procedural discomfort.  The patient voiced understanding and is willing to proceed.  A consent form will be signed by the procedural physician.       Orders this  "Visit:  No orders of the defined types were placed in this encounter.    No orders of the defined types were placed in this encounter.    There are no discontinued medications.    Today's clinic visit entailed:  Review of the result(s) of each unique test - EKG, Echo, BMP   Ordering of each unique test  Prescription drug management  40 minutes spent by me on the date of the encounter doing chart review, history and exam, documentation and further activities per the note  Provider  Link to [facility] Help Grid     The level of medical decision making during this visit was of moderate complexity.           Review of Systems:     Review of Systems:  Skin:        Eyes:       ENT:       Respiratory:  Positive for shortness of breath;dyspnea on exertion  Cardiovascular:    Positive for;palpitations;lightheadedness  Gastroenterology:      Genitourinary:       Musculoskeletal:       Neurologic:       Psychiatric:       Heme/Lymph/Imm:       Endocrine:  Negative thyroid disorder;diabetes            Physical Exam:     Vitals: /68   Pulse 51   Ht 1.626 m (5' 4\")   Wt 69.4 kg (153 lb)   LMP 05/26/2010   BMI 26.26 kg/m    Constitutional: Well nourished and in no apparent distress.  Eyes: Pupils equal, round. Sclerae anicteric.   HEENT: Normocephalic, atraumatic.   Neck: Supple.  Respiratory: Breathing non-labored. Lungs clear to auscultation bilaterally. No crackles, wheezes, rhonchi, or rales.  Cardiovascular: Regular rate and rhythm, normal S1 and S2. No murmur, rub, or gallop.  Skin: Warm, dry. No rashes, cyanosis, or xanthelasma.  Extremities: No edema.  Neurologic: No gross motor deficits. Alert, awake, and oriented to person, place and time.  Psychiatric: Affect appropriate.        CURRENT MEDICATIONS:  Current Outpatient Medications   Medication Sig Dispense Refill    acetaminophen (TYLENOL) 500 MG tablet Take 500-1,000 mg by mouth every 6 hours as needed for mild pain      apixaban ANTICOAGULANT (ELIQUIS) 5 MG " tablet Take 1 tablet (5 mg) by mouth 2 times daily 60 tablet 4    Ascorbic Acid (VITAMIN C) 500 MG CAPS Unknown dose at this time      biotin 1000 MCG TABS tablet Take 1,000 mcg by mouth daily      Calcium Carbonate-Vitamin D (CALCIUM + D) 600-200 MG-UNIT per tablet Take 2 tablets by mouth daily       ibuprofen (ADVIL/MOTRIN) 200 MG tablet Take 400 mg by mouth every 4 hours as needed for mild pain      metoprolol succinate ER (TOPROL XL) 25 MG 24 hr tablet Take 1 tablet (25 mg) by mouth daily 90 tablet 3    Multiple Vitamins-Calcium (ONE-A-DAY WOMENS FORMULA) TABS Take 1 tablet by mouth daily.      UNABLE TO FIND MEDICATION NAME: [employer] Allegra      VITAMIN E NATURAL PO          ALLERGIES  Allergies   Allergen Reactions    Sulfa Antibiotics Hives         PAST MEDICAL HISTORY:  Past Medical History:   Diagnosis Date    Allergic rhinitis 05/27/2011     Problem list name updated by automated process. Provider to review    Arthritis     hands    Diffuse cystic mastopathy 12/27/2012    History of basal cell carcinoma     Hx of atrial flutter 12/14/2018    Symptomatic menopausal or female climacteric states 01/09/2008       PAST SURGICAL HISTORY:  Past Surgical History:   Procedure Laterality Date    HC CORRECT BUNION,SIMPLE Right 1978    HC TOOTH EXTRACTION W/FORCEP  age 20    LAPAROSCOPIC HYSTERECTOMY TOTAL, BILATERAL SALPINGO-OOPHORECTOMY, COMBINED Bilateral 3/6/2019    Procedure: Single port total laparoscopic hysterectomy bilateral salpingo-oopherectomy;  Surgeon: Laisha Kathleen MD;  Location: [facility] OR       FAMILY HISTORY:  Family History   Problem Relation Age of Onset    Cancer Mother         presacral cancer    Hypertension Mother     Hypertension Father     Prostate Cancer Father     Diabetes Maternal Grandmother     Breast Cancer Maternal Aunt     C.A.D. 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   Tobacco Use    Smoking status: Former     Packs/day: 0.50     Years: 6.00     Pack years: 3.00     Types: Cigarettes     Quit date: 3/26/1982     Years since quittin.5    Smokeless tobacco: Never   Substance and Sexual Activity    Alcohol use: Yes     Alcohol/week: 7.0 - 9.0 standard drinks of alcohol     Types: 7 - 9 Standard drinks or equivalent per week     Comment: 7 drinks per week    Drug use: No    Sexual activity: Yes     Partners: Male     Birth control/protection: Post-menopausal     Comment:    Other Topics Concern     Service No    Blood Transfusions No    Caffeine Concern Yes    Occupational Exposure No    Hobby Hazards No    Sleep Concern No    Stress Concern No    Weight Concern No    Special Diet No    Exercise Yes    Seat Belt Yes    Self-Exams Yes

## 2023-09-28 NOTE — PATIENT INSTRUCTIONS
Today's Recommendations    Nothing to eat after midnight  Clear liquids up to 2 hours prior stop 6:30 am  Check in at 8:30 am at 10/3   and someone to stay with you post procedure  Will discuss Freddy morris with Dr. Way  Please follow up with Kathleen in 1 week post procedure.    Please send [email or web address] message or call [phone] to the RN team with questions or concerns.     Scheduling and after hours number [phone]    FERNANDEZ Rowell, CNP

## 2023-09-28 NOTE — LETTER
9/28/2023    Constanza Johnson MD  [address]  [facility] [address]    RE: Gaby Aguilar       Dear Colleague,     I had the pleasure of seeing Gaby Aguilar in the [facility].    Electrophysiology Clinic Progress Note  Gaby Aguilar MRN# [id number]   YOB: 1960 Age: 63 year old     Primary cardiologist: Dr. Araujo (general) and Dr. Wya (EP)    Reason for visit: Atrial fibrillation and tachybrady syndrome    History of presenting illness:    Gaby Aguilar is a pleasant 63 year old patient with past medical history significant for:    Paroxysmal atrial fibrillation and paroxysmal atrial flutter  [id number] Score 1  Asymptomatic moderate tachy-zach syndrome: conversion pauses on monitor   GERD    In 7/2022 she evaluated by Dr. Way every Zio patch revealed paroxysmal atrial fibrillation with a 10% burden and upon termination of atrial arrhythmias that she had sinus pauses up to 3.5 seconds and some while awake.  It was recommended that her beta-blocker be decreased from 37.5 mg twice daily of Toprol-XL to 25 mg daily and to alert us if she is having any concerns for syncope or presyncope.     She had a repeat monitor and 6/2023 that did reveal ongoing paroxysms of atrial fibrillation/atrial flutter with an 8% burden.  She again had sinus conversion pauses up to 3.2 seconds.  Dr. Way reviewed the findings and requested that we offer the patient either an atrial fibrillation ablation versus a permanent pacemaker to allow for A-fib control with RVR.  Unfortunately, if A-fib is left untreated she would be at risk for syncope from bradycardia plus or cardiomyopathy from tachycardia.    She was seen in follow-up on 8/14/2023 by June Christian PA-C and they discussed proceeding with a pacemaker versus atrial fibrillation ablation.  The patient elected for the latter.  It was recommended that she start Eliquis 5 mg twice daily approximately 3 weeks prior to the procedure and will  need to continue 2 months post.  Today we had an ongoing discussion regarding the risk and benefits of the procedure as below.    Diagnotic studies:  Zio Patch (6/26-7/3/2023): Paroxysmal atrial fibrillation/atrial flutter with an 8% burden.  Longest episode lasting 2 hours and 56 minutes with episodes of RVR.  Pauses up to 3.2 seconds upon AF conversion.  Zio Patch (6/2022): Paroxysmal atrial fibrillation and atrial flutter with 10% burden longest event lasting 1 hour and 41 minutes. Upon termination of atrial arrhythmias she had sinus pauses up to 3.5 seconds (some while awake)  Echocardiogram (8/2022): LVEF of 55 to 60% without wall motion abnormalities.  Trace to mild MR and TR.          Assessment and Plan:     ASSESSMENT:    Paroxysmal atrial arrhythmias with conversion pauses  [id number] score of 1 (gender)  Recently started on Eliquis on 8/14/2023    Asymptomatic tachybradycardia syndrome  Patient is asymptomatic with atrial fibrillation as well as with conversion pauses    PLAN:     CTa as scheduled  Nothing to eat after midnight  Clear liquids up to 2 hours prior stop 6:30 am  Check in at 8:30 am at 10/3   and someone to stay with you post procedure  Continue Eliquis up to the procedure  Hold Eliquis the am of the procedure unless in atrial fibrillation then take am dose. Please bring dose of Eliquis to the procedure.  Please follow up 1 week post procedure.    We discussed the risks, benefits and indications of proceeding with an electrophysiology study and pulmonary vein isolation/atrial fibrillation ablation, including but not limited to use of anesthesia (including intubation), peripheral vessel injury, discomfort, bruising, bleeding, esophageal injury, diaphragmatic injury, cardiac puncture and/or tamponade requiring emergency treatment, pulmonary vein stenosis requiring intervention, and stroke/TIA. We reviewed that additional procedures may be required.  We also briefly discussed  "post-procedural restrictions and post-procedural discomfort.  The patient voiced understanding and is willing to proceed.  A consent form will be signed by the procedural physician.       Orders this Visit:  No orders of the defined types were placed in this encounter.    No orders of the defined types were placed in this encounter.    There are no discontinued medications.    Today's clinic visit entailed:  Review of the result(s) of each unique test - EKG, Echo, BMP   Ordering of each unique test  Prescription drug management  40 minutes spent by me on the date of the encounter doing chart review, history and exam, documentation and further activities per the note  Provider  Link to [facility] Help Grid     The level of medical decision making during this visit was of moderate complexity.           Review of Systems:     Review of Systems:  Skin:        Eyes:       ENT:       Respiratory:  Positive for shortness of breath;dyspnea on exertion  Cardiovascular:    Positive for;palpitations;lightheadedness  Gastroenterology:      Genitourinary:       Musculoskeletal:       Neurologic:       Psychiatric:       Heme/Lymph/Imm:       Endocrine:  Negative thyroid disorder;diabetes            Physical Exam:     Vitals: /68   Pulse 51   Ht 1.626 m (5' 4\")   Wt 69.4 kg (153 lb)   LMP 05/26/2010   BMI 26.26 kg/m    Constitutional: Well nourished and in no apparent distress.  Eyes: Pupils equal, round. Sclerae anicteric.   HEENT: Normocephalic, atraumatic.   Neck: Supple.  Respiratory: Breathing non-labored. Lungs clear to auscultation bilaterally. No crackles, wheezes, rhonchi, or rales.  Cardiovascular: Regular rate and rhythm, normal S1 and S2. No murmur, rub, or gallop.  Skin: Warm, dry. No rashes, cyanosis, or xanthelasma.  Extremities: No edema.  Neurologic: No gross motor deficits. Alert, awake, and oriented to person, place and time.  Psychiatric: Affect appropriate.        CURRENT MEDICATIONS:  Current Outpatient " Medications   Medication Sig Dispense Refill    acetaminophen (TYLENOL) 500 MG tablet Take 500-1,000 mg by mouth every 6 hours as needed for mild pain      apixaban ANTICOAGULANT (ELIQUIS) 5 MG tablet Take 1 tablet (5 mg) by mouth 2 times daily 60 tablet 4    Ascorbic Acid (VITAMIN C) 500 MG CAPS Unknown dose at this time      biotin 1000 MCG TABS tablet Take 1,000 mcg by mouth daily      Calcium Carbonate-Vitamin D (CALCIUM + D) 600-200 MG-UNIT per tablet Take 2 tablets by mouth daily       ibuprofen (ADVIL/MOTRIN) 200 MG tablet Take 400 mg by mouth every 4 hours as needed for mild pain      metoprolol succinate ER (TOPROL XL) 25 MG 24 hr tablet Take 1 tablet (25 mg) by mouth daily 90 tablet 3    Multiple Vitamins-Calcium (ONE-A-DAY WOMENS FORMULA) TABS Take 1 tablet by mouth daily.      UNABLE TO FIND MEDICATION NAME: [employer] Allegra      VITAMIN E NATURAL PO          ALLERGIES  Allergies   Allergen Reactions    Sulfa Antibiotics Hives         PAST MEDICAL HISTORY:  Past Medical History:   Diagnosis Date    Allergic rhinitis 05/27/2011     Problem list name updated by automated process. Provider to review    Arthritis     hands    Diffuse cystic mastopathy 12/27/2012    History of basal cell carcinoma     Hx of atrial flutter 12/14/2018    Symptomatic menopausal or female climacteric states 01/09/2008       PAST SURGICAL HISTORY:  Past Surgical History:   Procedure Laterality Date    HC CORRECT BUNION,SIMPLE Right 1978    HC TOOTH EXTRACTION W/FORCEP  age 20    LAPAROSCOPIC HYSTERECTOMY TOTAL, BILATERAL SALPINGO-OOPHORECTOMY, COMBINED Bilateral 3/6/2019    Procedure: Single port total laparoscopic hysterectomy bilateral salpingo-oopherectomy;  Surgeon: Laisha Kathleen MD;  Location: [facility] OR       FAMILY HISTORY:  Family History   Problem Relation Age of Onset    Cancer Mother         presacral cancer    Hypertension Mother     Hypertension Father     Prostate Cancer Father     Diabetes Maternal Grandmother     Breast  Cancer Maternal Aunt     C.A.D. 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   Tobacco Use    Smoking status: Former     Packs/day: 0.50     Years: 6.00     Pack years: 3.00     Types: Cigarettes     Quit date: 3/26/1982     Years since quittin.5    Smokeless tobacco: Never   Substance and Sexual Activity    Alcohol use: Yes     Alcohol/week: 7.0 - 9.0 standard drinks of alcohol     Types: 7 - 9 Standard drinks or equivalent per week     Comment: 7 drinks per week    Drug use: No    Sexual activity: Yes     Partners: Male     Birth control/protection: Post-menopausal     Comment:    Other Topics Concern     Service No    Blood Transfusions No    Caffeine Concern Yes    Occupational Exposure No    Hobby Hazards No    Sleep Concern No    Stress Concern No    Weight Concern No    Special Diet No    Exercise Yes    Seat Belt Yes    Self-Exams Yes               Thank you for allowing me to participate in the care of your patient.      Sincerely,     Kathleen Mccord, FERNANDEZ [facility]  cc:   June Christian PA-C  [id number]  [place] [address]

## 2023-09-28 NOTE — H&P (VIEW-ONLY)
Electrophysiology Clinic Progress Note  Gaby Aguilar MRN# [id number]   YOB: 1960 Age: 63 year old     Primary cardiologist: Dr. Araujo (general) and Dr. Way (EP)    Reason for visit: Atrial fibrillation and tachybrady syndrome    History of presenting illness:    Gaby Aguilar is a pleasant 63 year old patient with past medical history significant for:    Paroxysmal atrial fibrillation and paroxysmal atrial flutter  [id number] Score 1  Asymptomatic moderate tachy-zach syndrome: conversion pauses on monitor   GERD    In 7/2022 she evaluated by Dr. Way every Zio patch revealed paroxysmal atrial fibrillation with a 10% burden and upon termination of atrial arrhythmias that she had sinus pauses up to 3.5 seconds and some while awake.  It was recommended that her beta-blocker be decreased from 37.5 mg twice daily of Toprol-XL to 25 mg daily and to alert us if she is having any concerns for syncope or presyncope.     She had a repeat monitor and 6/2023 that did reveal ongoing paroxysms of atrial fibrillation/atrial flutter with an 8% burden.  She again had sinus conversion pauses up to 3.2 seconds.  Dr. Way reviewed the findings and requested that we offer the patient either an atrial fibrillation ablation versus a permanent pacemaker to allow for A-fib control with RVR.  Unfortunately, if A-fib is left untreated she would be at risk for syncope from bradycardia plus or cardiomyopathy from tachycardia.    She was seen in follow-up on 8/14/2023 by June Christian PA-C and they discussed proceeding with a pacemaker versus atrial fibrillation ablation.  The patient elected for the latter.  It was recommended that she start Eliquis 5 mg twice daily approximately 3 weeks prior to the procedure and will need to continue 2 months post.  Today we had an ongoing discussion regarding the risk and benefits of the procedure as below.    Diagnotic studies:  Zio Patch (6/26-7/3/2023): Paroxysmal atrial  fibrillation/atrial flutter with an 8% burden.  Longest episode lasting 2 hours and 56 minutes with episodes of RVR.  Pauses up to 3.2 seconds upon AF conversion.  Zio Patch (6/2022): Paroxysmal atrial fibrillation and atrial flutter with 10% burden longest event lasting 1 hour and 41 minutes. Upon termination of atrial arrhythmias she had sinus pauses up to 3.5 seconds (some while awake)  Echocardiogram (8/2022): LVEF of 55 to 60% without wall motion abnormalities.  Trace to mild MR and TR.          Assessment and Plan:     ASSESSMENT:    Paroxysmal atrial arrhythmias with conversion pauses  [id number] score of 1 (gender)  Recently started on Eliquis on 8/14/2023    Asymptomatic tachybradycardia syndrome  Patient is asymptomatic with atrial fibrillation as well as with conversion pauses    PLAN:     CTa as scheduled  Nothing to eat after midnight  Clear liquids up to 2 hours prior stop 6:30 am  Check in at 8:30 am at 10/3   and someone to stay with you post procedure  Continue Eliquis up to the procedure  Hold Eliquis the am of the procedure unless in atrial fibrillation then take am dose. Please bring dose of Eliquis to the procedure.  Please follow up 1 week post procedure.    We discussed the risks, benefits and indications of proceeding with an electrophysiology study and pulmonary vein isolation/atrial fibrillation ablation, including but not limited to use of anesthesia (including intubation), peripheral vessel injury, discomfort, bruising, bleeding, esophageal injury, diaphragmatic injury, cardiac puncture and/or tamponade requiring emergency treatment, pulmonary vein stenosis requiring intervention, and stroke/TIA. We reviewed that additional procedures may be required.  We also briefly discussed post-procedural restrictions and post-procedural discomfort.  The patient voiced understanding and is willing to proceed.  A consent form will be signed by the procedural physician.       Orders this  "Visit:  No orders of the defined types were placed in this encounter.    No orders of the defined types were placed in this encounter.    There are no discontinued medications.    Today's clinic visit entailed:  Review of the result(s) of each unique test - EKG, Echo, BMP   Ordering of each unique test  Prescription drug management  40 minutes spent by me on the date of the encounter doing chart review, history and exam, documentation and further activities per the note  Provider  Link to [facility] Help Grid     The level of medical decision making during this visit was of moderate complexity.           Review of Systems:     Review of Systems:  Skin:        Eyes:       ENT:       Respiratory:  Positive for shortness of breath;dyspnea on exertion  Cardiovascular:    Positive for;palpitations;lightheadedness  Gastroenterology:      Genitourinary:       Musculoskeletal:       Neurologic:       Psychiatric:       Heme/Lymph/Imm:       Endocrine:  Negative thyroid disorder;diabetes            Physical Exam:     Vitals: /68   Pulse 51   Ht 1.626 m (5' 4\")   Wt 69.4 kg (153 lb)   LMP 05/26/2010   BMI 26.26 kg/m    Constitutional: Well nourished and in no apparent distress.  Eyes: Pupils equal, round. Sclerae anicteric.   HEENT: Normocephalic, atraumatic.   Neck: Supple.  Respiratory: Breathing non-labored. Lungs clear to auscultation bilaterally. No crackles, wheezes, rhonchi, or rales.  Cardiovascular: Regular rate and rhythm, normal S1 and S2. No murmur, rub, or gallop.  Skin: Warm, dry. No rashes, cyanosis, or xanthelasma.  Extremities: No edema.  Neurologic: No gross motor deficits. Alert, awake, and oriented to person, place and time.  Psychiatric: Affect appropriate.        CURRENT MEDICATIONS:  Current Outpatient Medications   Medication Sig Dispense Refill    acetaminophen (TYLENOL) 500 MG tablet Take 500-1,000 mg by mouth every 6 hours as needed for mild pain      apixaban ANTICOAGULANT (ELIQUIS) 5 MG " tablet Take 1 tablet (5 mg) by mouth 2 times daily 60 tablet 4    Ascorbic Acid (VITAMIN C) 500 MG CAPS Unknown dose at this time      biotin 1000 MCG TABS tablet Take 1,000 mcg by mouth daily      Calcium Carbonate-Vitamin D (CALCIUM + D) 600-200 MG-UNIT per tablet Take 2 tablets by mouth daily       ibuprofen (ADVIL/MOTRIN) 200 MG tablet Take 400 mg by mouth every 4 hours as needed for mild pain      metoprolol succinate ER (TOPROL XL) 25 MG 24 hr tablet Take 1 tablet (25 mg) by mouth daily 90 tablet 3    Multiple Vitamins-Calcium (ONE-A-DAY WOMENS FORMULA) TABS Take 1 tablet by mouth daily.      UNABLE TO FIND MEDICATION NAME: [employer] Allegra      VITAMIN E NATURAL PO          ALLERGIES  Allergies   Allergen Reactions    Sulfa Antibiotics Hives         PAST MEDICAL HISTORY:  Past Medical History:   Diagnosis Date    Allergic rhinitis 05/27/2011     Problem list name updated by automated process. Provider to review    Arthritis     hands    Diffuse cystic mastopathy 12/27/2012    History of basal cell carcinoma     Hx of atrial flutter 12/14/2018    Symptomatic menopausal or female climacteric states 01/09/2008       PAST SURGICAL HISTORY:  Past Surgical History:   Procedure Laterality Date    HC CORRECT BUNION,SIMPLE Right 1978    HC TOOTH EXTRACTION W/FORCEP  age 20    LAPAROSCOPIC HYSTERECTOMY TOTAL, BILATERAL SALPINGO-OOPHORECTOMY, COMBINED Bilateral 3/6/2019    Procedure: Single port total laparoscopic hysterectomy bilateral salpingo-oopherectomy;  Surgeon: Laisha Kathleen MD;  Location: [facility] OR       FAMILY HISTORY:  Family History   Problem Relation Age of Onset    Cancer Mother         presacral cancer    Hypertension Mother     Hypertension Father     Prostate Cancer Father     Diabetes Maternal Grandmother     Breast Cancer Maternal Aunt     C.A.D. No family hx of     Cerebrovascular Disease No family hx of     Cancer - colorectal No family hx of        SOCIAL HISTORY:  Social History     Socioeconomic  History    Marital status:      Spouse name: None    Number of children: 2    Years of education: None    Highest education level: None   Occupational History     Employer: [employer]     Comment: clerical   Tobacco Use    Smoking status: Former     Packs/day: 0.50     Years: 6.00     Pack years: 3.00     Types: Cigarettes     Quit date: 3/26/1982     Years since quittin.5    Smokeless tobacco: Never   Substance and Sexual Activity    Alcohol use: Yes     Alcohol/week: 7.0 - 9.0 standard drinks of alcohol     Types: 7 - 9 Standard drinks or equivalent per week     Comment: 7 drinks per week    Drug use: No    Sexual activity: Yes     Partners: Male     Birth control/protection: Post-menopausal     Comment:    Other Topics Concern     Service No    Blood Transfusions No    Caffeine Concern Yes    Occupational Exposure No    Hobby Hazards No    Sleep Concern No    Stress Concern No    Weight Concern No    Special Diet No    Exercise Yes    Seat Belt Yes    Self-Exams Yes

## 2023-10-02 ENCOUNTER — HOSPITAL ENCOUNTER (OUTPATIENT)
Dept: CARDIOLOGY | Facility: CLINIC | Age: 63
Discharge: HOME | End: 2023-10-02
Attending: PHYSICIAN ASSISTANT | Admitting: PHYSICIAN ASSISTANT
Payer: COMMERCIAL

## 2023-10-02 ENCOUNTER — TELEPHONE (OUTPATIENT)
Dept: CARDIOLOGY | Facility: CLINIC | Age: 63
End: 2023-10-02

## 2023-10-02 ENCOUNTER — ANESTHESIA EVENT (OUTPATIENT)
Dept: CARDIOLOGY | Facility: CLINIC | Age: 63
End: 2023-10-02
Payer: COMMERCIAL

## 2023-10-02 DIAGNOSIS — I48.0 PAF (PAROXYSMAL ATRIAL FIBRILLATION) (H): ICD-10-CM

## 2023-10-02 DIAGNOSIS — I48.92 PAROXYSMAL ATRIAL FLUTTER (H): ICD-10-CM

## 2023-10-02 DIAGNOSIS — I48.0 PAROXYSMAL ATRIAL FIBRILLATION (H): Primary | ICD-10-CM

## 2023-10-02 PROCEDURE — 75572 CT HRT W/3D IMAGE: CPT | Mod: 26 | Performed by: INTERNAL MEDICINE

## 2023-10-02 PROCEDURE — 75572 CT HRT W/3D IMAGE: CPT

## 2023-10-02 PROCEDURE — 250N000011 HC RX IP 250 OP 636: Performed by: PHYSICIAN ASSISTANT

## 2023-10-02 RX ORDER — IOPAMIDOL 755 MG/ML
500 INJECTION, SOLUTION INTRAVASCULAR ONCE
Status: COMPLETED | OUTPATIENT
Start: 2023-10-02 | End: 2023-10-02

## 2023-10-02 RX ORDER — SODIUM CHLORIDE, SODIUM LACTATE, POTASSIUM CHLORIDE, CALCIUM CHLORIDE 600; 310; 30; 20 MG/100ML; MG/100ML; MG/100ML; MG/100ML
INJECTION, SOLUTION INTRAVENOUS CONTINUOUS
Status: CANCELLED | OUTPATIENT
Start: 2023-10-02

## 2023-10-02 RX ORDER — ONDANSETRON 2 MG/ML
4 INJECTION INTRAMUSCULAR; INTRAVENOUS
Status: DISCONTINUED | OUTPATIENT
Start: 2023-10-02 | End: 2023-10-03 | Stop reason: HOSPADM

## 2023-10-02 RX ORDER — ACYCLOVIR 200 MG/1
0-1 CAPSULE ORAL
Status: DISCONTINUED | OUTPATIENT
Start: 2023-10-02 | End: 2023-10-03 | Stop reason: HOSPADM

## 2023-10-02 RX ORDER — METHYLPREDNISOLONE SODIUM SUCCINATE 125 MG/2ML
125 INJECTION, POWDER, LYOPHILIZED, FOR SOLUTION INTRAMUSCULAR; INTRAVENOUS
Status: DISCONTINUED | OUTPATIENT
Start: 2023-10-02 | End: 2023-10-03 | Stop reason: HOSPADM

## 2023-10-02 RX ORDER — LIDOCAINE 40 MG/G
CREAM TOPICAL
Status: CANCELLED | OUTPATIENT
Start: 2023-10-02

## 2023-10-02 RX ORDER — DIPHENHYDRAMINE HCL 25 MG
25 CAPSULE ORAL
Status: DISCONTINUED | OUTPATIENT
Start: 2023-10-02 | End: 2023-10-03 | Stop reason: HOSPADM

## 2023-10-02 RX ORDER — DIPHENHYDRAMINE HYDROCHLORIDE 50 MG/ML
25-50 INJECTION INTRAMUSCULAR; INTRAVENOUS
Status: DISCONTINUED | OUTPATIENT
Start: 2023-10-02 | End: 2023-10-03 | Stop reason: HOSPADM

## 2023-10-02 RX ADMIN — IOPAMIDOL 100 ML: 755 INJECTION, SOLUTION INTRAVENOUS at 10:08

## 2023-10-02 ASSESSMENT — COPD QUESTIONNAIRES: COPD: 0

## 2023-10-02 ASSESSMENT — LIFESTYLE VARIABLES: TOBACCO_USE: 0

## 2023-10-02 ASSESSMENT — ENCOUNTER SYMPTOMS: DYSRHYTHMIAS: 1

## 2023-10-02 NOTE — TELEPHONE ENCOUNTER
10/2/23 Called pt regarding Afib Ablation scheduled for tomorrow   Notified of arrival time and location -730 am [facility] Welcome desk   No solids 8 hours prior to arrival time  Clear liquids up until 2 hours prior to arrival  Discussed clear liquids allowed ( 7 up, ginger ale, chicken broth, apple juice, water, coffee with no cream or sugar)   Pt may have sips of water for am meds  Discussed meds to be held - OTC vitamins and supplements . Will hold am Eluis and bring along   Discussed that patient will need a  for ride home and someone to stay with pt x 24 hours once pt arrives home   Pt will check temperature am of procedure and call [facility] at [phone] in the am in temp > 100.0  Pt voiced understanding and stated they have no further questions at this time.  Nghia  1145 am

## 2023-10-02 NOTE — PROGRESS NOTES
Chart reviewed for upcoming procedure on 10/3/23  CSE: Yes  Pertinent allergies: No  Contrast allergy: No  Blood thinners: Eliquis  GLP-1: No  Orders in place: Yes  H&P completed: 9/28/23  Heart Clinic pre-procedure phone call: 10/2/23   Spoke w pt this am. INR 1.7. Pt denies missing any doses, but reports he did have a mod serving of broccoli in past few days. Pt reports he does have some Arixtra at home. Advised Arixtra 1 dose today and tomorrow, then stop. Warfarin dosing increased. Check INR on Friday.    22 CBC and BMP  H/H 13.8/40.8; Plt 424  Creatinine 0.64  Wt 73.2 KG  Creatinine Clearance 130.26 ml/min    Call to patient. Patient denies any complaints related to anticoagulation therapy at this time. Patient reports no change in medication or health. Reinforced with patient to call clinic with any medication changes as this can impact INR. Reinforced signs and symptoms bleeding/clotting with patient. Patient aware to seek medical care if signs and symptoms develop. Advised that if patient falls and/or hits their head, they should seek medical attention. Verbalizes understanding.     Dosing instructions given to patient verbally over the phone. Advised to call the clinic with any questions or concerns. Patient verbalizes understanding. Has clinic number.    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 high Vit K diet;      Anticoagulation Care  Providers     Provider Role Specialty Phone number    Dakota Robledo PA-C Referring Physician Assistant [phone]    Marvin Albert MD Referring Cardiovascular Disease [phone]          Supervising provider: Marvin Albert MD

## 2023-10-03 ENCOUNTER — HOSPITAL ENCOUNTER (OUTPATIENT)
Facility: CLINIC | Age: 63
Discharge: HOME | End: 2023-10-03
Admitting: INTERNAL MEDICINE
Payer: COMMERCIAL

## 2023-10-03 ENCOUNTER — ANESTHESIA (OUTPATIENT)
Dept: CARDIOLOGY | Facility: CLINIC | Age: 63
End: 2023-10-03
Payer: COMMERCIAL

## 2023-10-03 VITALS
RESPIRATION RATE: 18 BRPM | HEART RATE: 70 BPM | HEIGHT: 64 IN | DIASTOLIC BLOOD PRESSURE: 78 MMHG | WEIGHT: 151.8 LBS | SYSTOLIC BLOOD PRESSURE: 142 MMHG | TEMPERATURE: 97.1 F | BODY MASS INDEX: 25.92 KG/M2 | OXYGEN SATURATION: 96 %

## 2023-10-03 DIAGNOSIS — I48.92 PAROXYSMAL ATRIAL FLUTTER (H): ICD-10-CM

## 2023-10-03 DIAGNOSIS — I48.0 PAROXYSMAL ATRIAL FIBRILLATION (H): ICD-10-CM

## 2023-10-03 DIAGNOSIS — I48.0 PAF (PAROXYSMAL ATRIAL FIBRILLATION) (H): ICD-10-CM

## 2023-10-03 LAB
ACT BLD: 118 SECONDS (ref 74–150)
ACT BLD: 247 SECONDS (ref 74–150)
ACT BLD: 355 SECONDS (ref 74–150)
ACT BLD: 384 SECONDS (ref 74–150)
ANION GAP SERPL CALCULATED.3IONS-SCNC: 8 MMOL/L (ref 7–15)
BUN SERPL-MCNC: 13.3 MG/DL (ref 8–23)
CALCIUM SERPL-MCNC: 9.8 MG/DL (ref 8.8–10.2)
CHLORIDE SERPL-SCNC: 104 MMOL/L (ref 98–107)
CREAT SERPL-MCNC: 0.78 MG/DL (ref 0.51–0.95)
DEPRECATED HCO3 PLAS-SCNC: 28 MMOL/L (ref 22–29)
EGFRCR SERPLBLD CKD-EPI 2021: 85 ML/MIN/1.73M2
ERYTHROCYTE [DISTWIDTH] IN BLOOD BY AUTOMATED COUNT: 12.3 % (ref 10–15)
GLUCOSE SERPL-MCNC: 108 MG/DL (ref 70–99)
HCT VFR BLD AUTO: 38.9 % (ref 35–47)
HGB BLD-MCNC: 13.6 G/DL (ref 11.7–15.7)
MCH RBC QN AUTO: 32.6 PG (ref 26.5–33)
MCHC RBC AUTO-ENTMCNC: 35 G/DL (ref 31.5–36.5)
MCV RBC AUTO: 93 FL (ref 78–100)
PLATELET # BLD AUTO: 305 10E3/UL (ref 150–450)
POTASSIUM SERPL-SCNC: 4.5 MMOL/L (ref 3.4–5.3)
RBC # BLD AUTO: 4.17 10E6/UL (ref 3.8–5.2)
SODIUM SERPL-SCNC: 140 MMOL/L (ref 135–145)
WBC # BLD AUTO: 5.6 10E3/UL (ref 4–11)

## 2023-10-03 PROCEDURE — 250N000013 HC RX MED GY IP 250 OP 250 PS 637: Performed by: INTERNAL MEDICINE

## 2023-10-03 PROCEDURE — 999N000054 HC STATISTIC EKG NON-CHARGEABLE

## 2023-10-03 PROCEDURE — 999N000184 HC STATISTIC TELEMETRY

## 2023-10-03 PROCEDURE — 36591 DRAW BLOOD OFF VENOUS DEVICE: CPT

## 2023-10-03 PROCEDURE — 272N000001 HC OR GENERAL SUPPLY STERILE: Performed by: INTERNAL MEDICINE

## 2023-10-03 PROCEDURE — 258N000003 HC RX IP 258 OP 636: Performed by: INTERNAL MEDICINE

## 2023-10-03 PROCEDURE — 93657 TX L/R ATRIAL FIB ADDL: CPT | Performed by: INTERNAL MEDICINE

## 2023-10-03 PROCEDURE — C1732 CATH, EP, DIAG/ABL, 3D/VECT: HCPCS | Performed by: INTERNAL MEDICINE

## 2023-10-03 PROCEDURE — 93010 ELECTROCARDIOGRAM REPORT: CPT | Mod: XU | Performed by: INTERNAL MEDICINE

## 2023-10-03 PROCEDURE — 80048 BASIC METABOLIC PNL TOTAL CA: CPT | Performed by: INTERNAL MEDICINE

## 2023-10-03 PROCEDURE — 250N000025 HC SEVOFLURANE, PER MIN: Performed by: INTERNAL MEDICINE

## 2023-10-03 PROCEDURE — C1759 CATH, INTRA ECHOCARDIOGRAPHY: HCPCS | Performed by: INTERNAL MEDICINE

## 2023-10-03 PROCEDURE — 93655 ICAR CATH ABLTJ DSCRT ARRHYT: CPT | Performed by: INTERNAL MEDICINE

## 2023-10-03 PROCEDURE — C1733 CATH, EP, OTHR THAN COOL-TIP: HCPCS | Performed by: INTERNAL MEDICINE

## 2023-10-03 PROCEDURE — C1894 INTRO/SHEATH, NON-LASER: HCPCS | Performed by: INTERNAL MEDICINE

## 2023-10-03 PROCEDURE — 93656 COMPRE EP EVAL ABLTJ ATR FIB: CPT | Performed by: INTERNAL MEDICINE

## 2023-10-03 PROCEDURE — 258N000003 HC RX IP 258 OP 636: Performed by: ANESTHESIOLOGY

## 2023-10-03 PROCEDURE — 250N000011 HC RX IP 250 OP 636: Mod: JZ | Performed by: INTERNAL MEDICINE

## 2023-10-03 PROCEDURE — C1730 CATH, EP, 19 OR FEW ELECT: HCPCS | Performed by: INTERNAL MEDICINE

## 2023-10-03 PROCEDURE — C1731 CATH, EP, 20 OR MORE ELEC: HCPCS | Performed by: INTERNAL MEDICINE

## 2023-10-03 PROCEDURE — 250N000011 HC RX IP 250 OP 636: Mod: JZ | Performed by: ANESTHESIOLOGY

## 2023-10-03 PROCEDURE — 370N000017 HC ANESTHESIA TECHNICAL FEE, PER MIN: Performed by: INTERNAL MEDICINE

## 2023-10-03 PROCEDURE — 36415 COLL VENOUS BLD VENIPUNCTURE: CPT | Performed by: INTERNAL MEDICINE

## 2023-10-03 PROCEDURE — 999N000071 HC STATISTIC HEART CATH LAB OR EP LAB

## 2023-10-03 PROCEDURE — 250N000009 HC RX 250: Performed by: ANESTHESIOLOGY

## 2023-10-03 PROCEDURE — 250N000011 HC RX IP 250 OP 636: Performed by: ANESTHESIOLOGY

## 2023-10-03 PROCEDURE — 85027 COMPLETE CBC AUTOMATED: CPT | Performed by: INTERNAL MEDICINE

## 2023-10-03 PROCEDURE — 93005 ELECTROCARDIOGRAM TRACING: CPT

## 2023-10-03 PROCEDURE — 85347 COAGULATION TIME ACTIVATED: CPT | Mod: 91

## 2023-10-03 PROCEDURE — C1769 GUIDE WIRE: HCPCS | Performed by: INTERNAL MEDICINE

## 2023-10-03 RX ORDER — SODIUM CHLORIDE, SODIUM LACTATE, POTASSIUM CHLORIDE, CALCIUM CHLORIDE 600; 310; 30; 20 MG/100ML; MG/100ML; MG/100ML; MG/100ML
INJECTION, SOLUTION INTRAVENOUS CONTINUOUS
Status: DISCONTINUED | OUTPATIENT
Start: 2023-10-03 | End: 2023-10-03 | Stop reason: HOSPADM

## 2023-10-03 RX ORDER — NALOXONE HYDROCHLORIDE 0.4 MG/ML
0.2 INJECTION, SOLUTION INTRAMUSCULAR; INTRAVENOUS; SUBCUTANEOUS
Status: DISCONTINUED | OUTPATIENT
Start: 2023-10-03 | End: 2023-10-03 | Stop reason: HOSPADM

## 2023-10-03 RX ORDER — ONDANSETRON 2 MG/ML
4 INJECTION INTRAMUSCULAR; INTRAVENOUS EVERY 30 MIN PRN
Status: DISCONTINUED | OUTPATIENT
Start: 2023-10-03 | End: 2023-10-03 | Stop reason: HOSPADM

## 2023-10-03 RX ORDER — LIDOCAINE HYDROCHLORIDE 20 MG/ML
INJECTION, SOLUTION INFILTRATION; PERINEURAL PRN
Status: DISCONTINUED | OUTPATIENT
Start: 2023-10-03 | End: 2023-10-03

## 2023-10-03 RX ORDER — SODIUM CHLORIDE, SODIUM LACTATE, POTASSIUM CHLORIDE, CALCIUM CHLORIDE 600; 310; 30; 20 MG/100ML; MG/100ML; MG/100ML; MG/100ML
INJECTION, SOLUTION INTRAVENOUS CONTINUOUS PRN
Status: DISCONTINUED | OUTPATIENT
Start: 2023-10-03 | End: 2023-10-03

## 2023-10-03 RX ORDER — ACETAMINOPHEN 325 MG/1
650 TABLET ORAL EVERY 4 HOURS PRN
Status: DISCONTINUED | OUTPATIENT
Start: 2023-10-03 | End: 2023-10-03 | Stop reason: HOSPADM

## 2023-10-03 RX ORDER — FENTANYL CITRATE 50 UG/ML
INJECTION, SOLUTION INTRAMUSCULAR; INTRAVENOUS PRN
Status: DISCONTINUED | OUTPATIENT
Start: 2023-10-03 | End: 2023-10-03

## 2023-10-03 RX ORDER — PROPOFOL 10 MG/ML
INJECTION, EMULSION INTRAVENOUS PRN
Status: DISCONTINUED | OUTPATIENT
Start: 2023-10-03 | End: 2023-10-03

## 2023-10-03 RX ORDER — NALOXONE HYDROCHLORIDE 0.4 MG/ML
0.4 INJECTION, SOLUTION INTRAMUSCULAR; INTRAVENOUS; SUBCUTANEOUS
Status: DISCONTINUED | OUTPATIENT
Start: 2023-10-03 | End: 2023-10-03 | Stop reason: HOSPADM

## 2023-10-03 RX ORDER — DEXAMETHASONE SODIUM PHOSPHATE 4 MG/ML
INJECTION, SOLUTION INTRA-ARTICULAR; INTRALESIONAL; INTRAMUSCULAR; INTRAVENOUS; SOFT TISSUE PRN
Status: DISCONTINUED | OUTPATIENT
Start: 2023-10-03 | End: 2023-10-03

## 2023-10-03 RX ORDER — HEPARIN SODIUM 1000 [USP'U]/ML
INJECTION, SOLUTION INTRAVENOUS; SUBCUTANEOUS
Status: DISCONTINUED | OUTPATIENT
Start: 2023-10-03 | End: 2023-10-03 | Stop reason: HOSPADM

## 2023-10-03 RX ORDER — EPHEDRINE SULFATE 50 MG/ML
INJECTION, SOLUTION INTRAMUSCULAR; INTRAVENOUS; SUBCUTANEOUS PRN
Status: DISCONTINUED | OUTPATIENT
Start: 2023-10-03 | End: 2023-10-03

## 2023-10-03 RX ORDER — HYDROMORPHONE HYDROCHLORIDE 1 MG/ML
0.4 INJECTION, SOLUTION INTRAMUSCULAR; INTRAVENOUS; SUBCUTANEOUS EVERY 5 MIN PRN
Status: DISCONTINUED | OUTPATIENT
Start: 2023-10-03 | End: 2023-10-03 | Stop reason: HOSPADM

## 2023-10-03 RX ORDER — LIDOCAINE 40 MG/G
CREAM TOPICAL
Status: DISCONTINUED | OUTPATIENT
Start: 2023-10-03 | End: 2023-10-03 | Stop reason: HOSPADM

## 2023-10-03 RX ORDER — KETOROLAC TROMETHAMINE 30 MG/ML
INJECTION, SOLUTION INTRAMUSCULAR; INTRAVENOUS
Status: DISCONTINUED | OUTPATIENT
Start: 2023-10-03 | End: 2023-10-03 | Stop reason: HOSPADM

## 2023-10-03 RX ORDER — PANTOPRAZOLE SODIUM 40 MG/1
40 TABLET, DELAYED RELEASE ORAL DAILY
Qty: 30 TABLET | Refills: 0 | Status: SHIPPED | OUTPATIENT
Start: 2023-10-04 | End: 2024-01-05

## 2023-10-03 RX ORDER — HYDROMORPHONE HYDROCHLORIDE 1 MG/ML
0.2 INJECTION, SOLUTION INTRAMUSCULAR; INTRAVENOUS; SUBCUTANEOUS EVERY 5 MIN PRN
Status: DISCONTINUED | OUTPATIENT
Start: 2023-10-03 | End: 2023-10-03 | Stop reason: HOSPADM

## 2023-10-03 RX ORDER — FENTANYL CITRATE 50 UG/ML
25 INJECTION, SOLUTION INTRAMUSCULAR; INTRAVENOUS EVERY 5 MIN PRN
Status: DISCONTINUED | OUTPATIENT
Start: 2023-10-03 | End: 2023-10-03 | Stop reason: HOSPADM

## 2023-10-03 RX ORDER — FENTANYL CITRATE 50 UG/ML
50 INJECTION, SOLUTION INTRAMUSCULAR; INTRAVENOUS EVERY 5 MIN PRN
Status: DISCONTINUED | OUTPATIENT
Start: 2023-10-03 | End: 2023-10-03 | Stop reason: HOSPADM

## 2023-10-03 RX ORDER — ONDANSETRON 2 MG/ML
INJECTION INTRAMUSCULAR; INTRAVENOUS PRN
Status: DISCONTINUED | OUTPATIENT
Start: 2023-10-03 | End: 2023-10-03

## 2023-10-03 RX ORDER — PROTAMINE SULFATE 10 MG/ML
INJECTION, SOLUTION INTRAVENOUS
Status: DISCONTINUED | OUTPATIENT
Start: 2023-10-03 | End: 2023-10-03 | Stop reason: HOSPADM

## 2023-10-03 RX ORDER — ONDANSETRON 4 MG/1
4 TABLET, ORALLY DISINTEGRATING ORAL EVERY 30 MIN PRN
Status: DISCONTINUED | OUTPATIENT
Start: 2023-10-03 | End: 2023-10-03 | Stop reason: HOSPADM

## 2023-10-03 RX ADMIN — SUGAMMADEX 200 MG: 100 INJECTION, SOLUTION INTRAVENOUS at 13:23

## 2023-10-03 RX ADMIN — MIDAZOLAM 2 MG: 1 INJECTION INTRAMUSCULAR; INTRAVENOUS at 10:18

## 2023-10-03 RX ADMIN — Medication 5 MG: at 13:23

## 2023-10-03 RX ADMIN — Medication 5 MG: at 10:55

## 2023-10-03 RX ADMIN — Medication 5 MG: at 13:25

## 2023-10-03 RX ADMIN — LIDOCAINE HYDROCHLORIDE 100 MG: 20 INJECTION, SOLUTION INFILTRATION; PERINEURAL at 10:22

## 2023-10-03 RX ADMIN — SODIUM CHLORIDE, POTASSIUM CHLORIDE, SODIUM LACTATE AND CALCIUM CHLORIDE: 600; 310; 30; 20 INJECTION, SOLUTION INTRAVENOUS at 07:57

## 2023-10-03 RX ADMIN — FENTANYL CITRATE 50 MCG: 50 INJECTION, SOLUTION INTRAMUSCULAR; INTRAVENOUS at 10:36

## 2023-10-03 RX ADMIN — PROPOFOL 200 MG: 10 INJECTION, EMULSION INTRAVENOUS at 10:22

## 2023-10-03 RX ADMIN — FENTANYL CITRATE 50 MCG: 50 INJECTION, SOLUTION INTRAMUSCULAR; INTRAVENOUS at 10:22

## 2023-10-03 RX ADMIN — DEXAMETHASONE SODIUM PHOSPHATE 4 MG: 4 INJECTION, SOLUTION INTRA-ARTICULAR; INTRALESIONAL; INTRAMUSCULAR; INTRAVENOUS; SOFT TISSUE at 10:32

## 2023-10-03 RX ADMIN — Medication 5 MG: at 10:50

## 2023-10-03 RX ADMIN — PHENYLEPHRINE HYDROCHLORIDE 0.2 MCG/KG/MIN: 10 INJECTION INTRAVENOUS at 10:35

## 2023-10-03 RX ADMIN — SODIUM CHLORIDE, POTASSIUM CHLORIDE, SODIUM LACTATE AND CALCIUM CHLORIDE: 600; 310; 30; 20 INJECTION, SOLUTION INTRAVENOUS at 10:15

## 2023-10-03 RX ADMIN — ROCURONIUM BROMIDE 50 MG: 50 INJECTION, SOLUTION INTRAVENOUS at 10:22

## 2023-10-03 RX ADMIN — ONDANSETRON 4 MG: 2 INJECTION INTRAMUSCULAR; INTRAVENOUS at 13:26

## 2023-10-03 RX ADMIN — ACETAMINOPHEN 650 MG: 325 TABLET, FILM COATED ORAL at 17:22

## 2023-10-03 RX ADMIN — Medication 5 MG: at 10:38

## 2023-10-03 ASSESSMENT — ACTIVITIES OF DAILY LIVING (ADL)
ADLS_ACUITY_SCORE: 35

## 2023-10-03 NOTE — ANESTHESIA PREPROCEDURE EVALUATION
Anesthesia Pre-Procedure Evaluation    Patient: José Manuel Jaimes   MRN: [id number] : 1960        Procedure : Procedure(s):  Ablation Atrial Fibrilation          Past Medical History:   Diagnosis Date     Allergic rhinitis 2011     Problem list name updated by automated process. Provider to review     Arthritis     hands     Diffuse cystic mastopathy 2012     History of basal cell carcinoma      Hx of atrial flutter 2018     Symptomatic menopausal or female climacteric states 2008      Past Surgical History:   Procedure Laterality Date     HC CORRECT BUNION,SIMPLE Right      HC TOOTH EXTRACTION W/FORCEP  age 20     LAPAROSCOPIC HYSTERECTOMY TOTAL, BILATERAL SALPINGO-OOPHORECTOMY, COMBINED Bilateral 3/6/2019    Procedure: Single port total laparoscopic hysterectomy bilateral salpingo-oopherectomy;  Surgeon: Laisha Kathleen MD;  Location: [facility] OR      Allergies   Allergen Reactions     Sulfa Antibiotics Hives      Social History     Tobacco Use     Smoking status: Former     Packs/day: 0.50     Years: 6.00     Pack years: 3.00     Types: Cigarettes     Quit date: 3/26/1982     Years since quittin.5     Smokeless tobacco: Never   Substance Use Topics     Alcohol use: Yes     Alcohol/week: 7.0 - 9.0 standard drinks of alcohol     Types: 7 - 9 Standard drinks or equivalent per week     Comment: 7 drinks per week      Wt Readings from Last 1 Encounters:   23 69.4 kg (153 lb)        Anesthesia Evaluation   Pt has had prior anesthetic. Type: General.    No history of anesthetic complications       ROS/MED HX  ENT/Pulmonary:     (+)           allergic rhinitis,                         (-) tobacco use, asthma, COPD and sleep apnea   Neurologic:  - neg neurologic ROS     Cardiovascular:     (+)  - -   -  - -                        dysrhythmias, a-fib,        Previous cardiac testing   Echo: Date:  Results:  Name: JOSÉ MANUEL JAIMES  MRN: [id number]  : 1960  Study Date:  08/05/2022 07:36 AM  Age: 61 yrs  Gender: Female  Patient Location: [facility]  Reason For Study: Paroxysmal atrial flutter (H), Dyspnea on exertion  Ordering Physician: RAMON CHRISTIAN  Referring Physician: RAMON CHRISTIAN  Performed By: RHODA Nelson     BSA: 1.7 m2  Height: 64 in  Weight: 145 lb  HR: 70  BP: 131/82 mmHg  ______________________________________________________________________________  Procedure  Complete Echo Adult.  ______________________________________________________________________________  Interpretation Summary     1. The left ventricle is normal in size. There is normal left ventricular wall  thickness. Left ventricular systolic function is normal. The visual ejection  fraction is 55-60%. Diastolic Doppler findings (E/E' ratio and/or other  parameters) suggest left ventricular filling pressures are normal. No regional  wall motion abnormalities noted.  2. The right ventricle is normal size. The right ventricular systolic function  is normal.  3. Trace to mild mitral and tricuspid regurgitation.  4. No pericardial effusion.  5. In comparison to the previous report dated 06/01/2021, the findings are  similar.  ______________________________________________________________________________  Left Ventricle  The left ventricle is normal in size. There is normal left ventricular wall  thickness. Left ventricular systolic function is normal. The visual ejection  fraction is 55-60%. Diastolic Doppler findings (E/E' ratio and/or other  parameters) suggest left ventricular filling pressures are normal. No regional  wall motion abnormalities noted.     Right Ventricle  The right ventricle is normal size. The right ventricular systolic function is  normal.     Atria  Normal left atrial size. Right atrial size is normal. There is no color  Doppler evidence of an atrial shunt.     Mitral Valve  There is mild (1+) mitral regurgitation.     Tricuspid Valve  There is trace tricuspid regurgitation. The right  ventricular systolic  pressure is approximated at 13.2 mmHg plus the right atrial pressure.     Aortic Valve  There is mild trileaflet aortic sclerosis. No aortic regurgitation is present.  No aortic stenosis is present.     Pulmonic Valve  There is trace pulmonic valvular regurgitation. There is no pulmonic valvular  stenosis.     Vessels  The aortic root is normal size. Normal size ascending aorta. Descending aortic  velocity normal. The inferior vena cava is normal.     Pericardium  Stress Test:  Date: Results:    ECG Reviewed:  Date: Results:    Cath:  Date: Results:   (-) hypertension, CAD and murmur   METS/Exercise Tolerance: >4 METS    Hematologic:  - neg hematologic  ROS     Musculoskeletal:   (+)  arthritis,             GI/Hepatic:     (+) GERD,                (-) liver disease   Renal/Genitourinary:    (-) renal disease   Endo:    (-) Type I DM and Type II DM   Psychiatric/Substance Use:       Infectious Disease:       Malignancy:       Other:            Physical Exam    Airway        Mallampati: III   TM distance: > 3 FB   Neck ROM: full   Mouth opening: > 3 cm    Respiratory Devices and Support         Dental       (+) Modest Abnormalities - crowns, retainers, 1 or 2 missing teeth      Cardiovascular          Rhythm and rate: regular and normal (-) no murmur    Pulmonary   pulmonary exam normal            OUTSIDE LABS:  CBC:   Lab Results   Component Value Date    WBC 5.3 05/23/2021    WBC 5.9 02/18/2021    HGB 13.3 05/23/2021    HGB 15.0 02/18/2021    HCT 39.4 05/23/2021    HCT 47.9 (A) 02/18/2021     05/23/2021     02/18/2021     BMP:   Lab Results   Component Value Date    .0 01/16/2023     05/23/2021    POTASSIUM 4.65 01/16/2023    POTASSIUM 4.0 05/23/2021    CHLORIDE 100.8 01/16/2023    CHLORIDE 105 05/23/2021    CO2 31.7 01/16/2023    CO2 29 05/23/2021    BUN 12 01/16/2023    BUN 13.8 01/16/2023    CR 0.87 01/16/2023    CR 0.75 05/23/2021     (A) 01/16/2023    GLC  90 05/23/2021     COAGS: No results found for: PTT, INR, FIBR  POC:   Lab Results   Component Value Date    HCGS Negative 12/07/2018     HEPATIC:   Lab Results   Component Value Date    ALBUMIN 4.8 02/18/2021    PROTTOTAL 7.2 02/18/2021    ALT 16 11/09/2016    AST 15 11/09/2016    ALKPHOS 68 02/18/2021    BILITOTAL 1.1 02/18/2021     OTHER:   Lab Results   Component Value Date    PH 6.0 12/01/2006    A1C 5.1 01/16/2023    VINH 9.4 01/16/2023    MAG 2.4 (H) 12/07/2018    TSH 1.30 05/23/2021    SED 10 03/16/2018       Anesthesia Plan    ASA Status:  3    NPO Status:  NPO Appropriate    Anesthesia Type: General.     - Airway: ETT   Induction: Intravenous.   Maintenance: Balanced.        Consents    Anesthesia Plan(s) and associated risks, benefits, and realistic alternatives discussed. Questions answered and patient/representative(s) expressed understanding.     - Discussed:     - Discussed with:  Patient      - Extended Intubation/Ventilatory Support Discussed: Yes.      - Patient is DNR/DNI Status: No     Use of blood products discussed: Yes.     - Discussed with: Patient.     - Consented: consented to blood products            Reason for refusal: other.     Postoperative Care    Pain management: IV analgesics.   PONV prophylaxis: Ondansetron (or other 5HT-3), Dexamethasone or Solumedrol     Comments:                Dony Noble MD

## 2023-10-03 NOTE — PROGRESS NOTES
1510: Pt returned from EP Lab/PACU. Bandaid CDI to right groin puncture sites. Stop cock in place. No oozing or hematoma noted. Area soft & flat. Pt denies pain. Pt instructed on activity restrictions while on bedrest. Verbal understanding received from pt. Detailed report to Keri SAM for shift change.

## 2023-10-03 NOTE — INTERVAL H&P NOTE
Patient:   HERMINIO WILSON            MRN: [id number]            FIN: [id number]               Age:   89 years     Sex:  FEMALE     :  19   Associated Diagnoses:   None   Author:   RYAN SIGALA      Basic Information   History source: Patient.      History of Present Illness     [facility] Hospitalist - Discharge Summary    DATE OF ADMISSION: (10/10/2018)   DATE OF DISCHARGE: (10/13/2018)     PCP: Dr. Jose G Espinal     CONSULTING PHYSICIAN(s):  Dr. Favio Zamarripa (ortho)    DISCHARGE DIAGNOSES:   Mechanical fall, resulting in left humerus fracture  Chronic macrocytic anemia   Depression     PROCEDURES PERFORMED DURING ADMISSION AND DATES PERFORMED:   None     TEST RESULTS PENDING AT DISCHARGE AND PROBLEMS NEEDING F/U:    None     HOSPITAL COURSE:   This is a 89-year-old female, a patient of Dr. Nikky Green, with past medical history of depression, history of ovarian and colon CA, who presented to the ER with chief complaint of mechanical fall. Upon arrival to the ER, vital signs show HR 53, RR 24, 94%, and /116.  Lab workup revealed chloride 109, glucose 166, protein 6.3, albumin 3.4, Hgb 10.8.  Patient received hydralazine, morphine, zofran.  XR CHEST 1V showed no acute cardiopulmonary process.  XR HUMERUS LT MIN 2V showed laterally displaced spiral fracture of the mid to distal left humeral diaphysis.  Repeat XR HUMERUS LT MIN 2V showed comminuted displaced fracture of the mid humeral shaft is again noted.  There is more separation or displacement.  A splint has been applied.  Patient transferred to the floor for further evaluation and management.    On admission the patient presented with mechanical fall, resulting in left humerus fracture. Ortho was consulted. A splint and sling was placed. She was provided with pain control as needed.     The patient's chronic conditions were managed appropriately. She is medically stable for discharge to [facility] and should follow up as  "I have reviewed the surgical (or preoperative) H&P that is linked to this encounter, and examined the patient. There are no significant changes    Clinical Conditions Present on Arrival:  Clinically Significant Risk Factors Present on Admission                # Drug Induced Coagulation Defect: home medication list includes an anticoagulant medication   # Overweight: Estimated body mass index is 26.06 kg/m  as calculated from the following:    Height as of this encounter: 1.626 m (5' 4\").    Weight as of this encounter: 68.9 kg (151 lb 12.8 oz).       " noted below. The patient understands and agrees with the discharge plan.     CONDITION ON DISCHARGE: Stable  CODE STATUS: Full Code      DISCHARGE INSTRUCTIONS:  DISCHARGE TO: Home   DIET: General   ACTIVITY: as tolerated   HOME HEALTH CARE RN/PT/OT/MSW/Wound/Ostomy Agency: None  HOME OXYGEN: None      FOLLOW-UP APPOINTMENTS:   Dr. Zamarripa on 10/18/2018, bring taz knapp to clinic visit       DISCHARGE MEDICATION LIST   Allergies: No known allergies     MEDICATION  DOSE  ROUTE  FREQUENCY  SPECIAL INSTRUCTIONS   FLUoxetine (FLUoxetine oral 20 mg capsule)  20 mg=1 cap  Oral  Daily     heparin (heparin 5000 units/mL injectable solution)    Subcutaneous  Every 8 hours   (to give until patient more ambulatory)  traMADol (traMADol oral 50 mg tablet)  50 mg=1 tab  Oral  Every 6 hours As Needed: pain moderate        Medications Started / Medications Discontinued / Medication Dosage Changes During this Admission:  Started : Tramadol   Stopped    TOTAL TIME SPENT: >30 minutes    Charting performed by marlon Lord and Sergio Cheema for Dr. Yeboah .        Physical Examination                Vital signs   Vital Signs   10/13/18 13:48 Temperature - VS 36.0 deg_C  Normal    Temperature Source - VS Temporal    Heart/Pulse Rate 69  Normal    Pulse Source Monitor    Respiration Rate 16 breaths/min  Normal    SpO2 95 %  Normal    Pulse Ox Probe Site Left Hand    NIBP Systolic 144  HI    NIBP Diastolic 51  LOW    NIBP Source Right Arm    NIBP MAP 82  Normal    SN Alarms Set and Appropriate Patient Alarms Set And Appropriate For Patient   .      Medical Decision Making   Results review:    Labs between:  12-OCT-2018 15:54 to 13-OCT-2018 15:54    CBC:                 WBC  HgB  Hct  Plt  MCV  RDW   13-OCT-2018 6.9  (L) 9.9  (L) 30.7  224  (H) 104.1  (H) 15.9                                I & O between:  12-OCT-2018 15:54 TO 13-OCT-2018 15:54  Med Dosing Weight:  73  kg   10-OCT-2018  24 Hour Intake:   0.00  ( 0.00  mL/kg )  24 Hour Output:   950.00           24 Hour Urine/Stool Output:   0.0  24 Hour Balance:   -950.00           24 Hour Urine Output:   950.00  ( 0.54 mL/kg/hr )                   Urine Count:  2.00      .   Rationale:  All medical record entries made by the scribe were at my direction. I have reviewed the chart and agree that the record accurately reflects my personal performance of the history, physical exam, hospital course, and assessment and plan..            Electronically Signed On 10/13/2018 23:56  __________________________________________________   RYAN SIGALA

## 2023-10-03 NOTE — PROGRESS NOTES
Care Suites Discharge Nursing Note    Patient Information  Name: Gaby Aguilar  Age: 63 year old    Discharge Education:  Discharge instructions reviewed: Yes  Additional education/resources provided: N/A  Patient/patient representative verbalizes understanding: Yes  Patient discharging on new medications: Yes  Medication education completed: Yes    Discharge Plans:   Discharge location: home  Discharge ride contacted: Yes    Pt off bedrest at 1730 w bleeding at incision. Manual pressure and thrombix applied. Resume another hour of bedrest. MD Way at bedside for check in.     Additional bedrest complete, pt up to the bathroom 1830 without bleeding or hematoma. Denies pain. Dressing C/D/I.    Approximate discharge time: 1900.     Discharge Criteria:  Discharge criteria met and vital signs stable: Yes    Patient Belongs:  Patient belongings returned to patient: Yes    Leah Severson, RN

## 2023-10-03 NOTE — ANESTHESIA PROCEDURE NOTES
Airway       Patient location during procedure: OR       Procedure Start/Stop Times: 10/3/2023 10:25 AM  Staff -        CRNA: Georgina Klein APRN CRNA       Performed By: CRNA  Consent for Airway        Urgency: elective  Indications and Patient Condition       Indications for airway management: dayanara-procedural       Induction type:intravenous       Mask difficulty assessment: 1 - vent by mask    Final Airway Details       Final airway type: endotracheal airway       Successful airway: ETT - single  Endotracheal Airway Details        ETT size (mm): 7.0       Cuffed: yes       Successful intubation technique: video laryngoscopy       VL Blade Size: Gutierrez 3       Grade View of Cords: 1       Adjucts: stylet       Position: Right       Measured from: gums/teeth       Secured at (cm): 22       Bite block used: None    Post intubation assessment        Placement verified by: capnometry, equal breath sounds and chest rise        Number of attempts at approach: 1       Number of other approaches attempted: 0       Secured with: silk tape       Ease of procedure: easy       Dentition: Intact and Unchanged    Medication(s) Administered   Medication Administration Time: 10/3/2023 10:25 AM

## 2023-10-03 NOTE — ANESTHESIA POSTPROCEDURE EVALUATION
Patient: Gaby Aguilar    Procedure: Procedure(s):  Ablation Atrial Fibrilation       Anesthesia Type:  General    Note:  Disposition: Outpatient   Postop Pain Control: Uneventful            Sign Out: Well controlled pain   PONV: No   Neuro/Psych: Uneventful            Sign Out: Acceptable/Baseline neuro status   Airway/Respiratory: Uneventful            Sign Out: Acceptable/Baseline resp. status   CV/Hemodynamics: Uneventful            Sign Out: Acceptable CV status; No obvious hypovolemia; No obvious fluid overload   Other NRE: NONE   DID A NON-ROUTINE EVENT OCCUR? No           Last vitals:  Vitals Value Taken Time   /88 10/03/23 1445   Temp 36.2  C (97.1  F) 10/03/23 1430   Pulse 64 10/03/23 1455   Resp 21 10/03/23 1455   SpO2 94 % 10/03/23 1455   Vitals shown include unvalidated device data.    Electronically Signed By: Dony Noble MD  October 3, 2023  6:34 PM

## 2023-10-03 NOTE — PROGRESS NOTES
Dictated.    Patient is sinus rhythm.  Successful wide area PVI.  RF energy was limited in anterior RSPV to phrenic nerve proximity and left PV antrum due to esophageal proximity.    Entrance and exit block achieved.  CTI ablation was performed.  Complex CTI anatomy with prominent eustachian ridge.  Bidirectional conduction block was not achieved despite extensive ablation.    EBL 30-40 mL.  Toradol and protamine were given.  No apparent complication.      Plan:  -Bedrest for at least 4 hours  -Home later this evening, if doing well  -PPI for 30 days  -Continue Eliquis  -EP clinic follow-up in 7 to 10 days

## 2023-10-03 NOTE — DISCHARGE INSTRUCTIONS
A Fib Ablation Discharge Instructions    After you go home:  Have an adult stay with you until tomorrow.  You may eat your normal diet, unless your doctor tells you otherwise.  RELAX and take it easy for 3 days.        For 24-48 hours (due to the sedation you received):  DO NOT DRIVE FOR 2 DAYS!   Do NOT make any important or legal decisions.  Do NOT drive or operate machines at home or at work.  Do NOT drink alcohol.    Care of Puncture Site:  Check the puncture site severy 1-2 hours while awake.  For 2-3 days, when you cough, sneeze, laugh or move your bowels, hold your hand over the puncture sites and press firmly.  Please remove Dressing after 24 hours.  Then apply a band aid daily for at least 3 days (if needed). If there is minor oozing, apply another band aid and remove it after 12 hours.   It is normal to have a small bruise or a small lump that is present under the skin . This will go away on its own after 3-4 weeks.   You may shower. Do NOT take a bath, or use a hot tub or pool until groin site heals, which may take up to a week.  Do NOT scrub the site. Do NOT use lotion or powder near the puncture site.    Activity:  NO bending, stooping over or squatting  for 3 days  Do NOT lift, push or pull more than 10 pounds (equal to a gallon of milk) for 3 days.  NO repetitive motions such as loading , vacuuming, raking, shoveling, going up and down the stairs.     Bleeding:  If you start bleeding from the groin site, lie down flat and press firmly on the site for 10 minutes or until bleeding stops.   Once bleeding stops, lay flat for 1-2 hours.  Call your A Fib nurse if bleeding does not stop or after hours will need to go to ER.       Go to ER or Call 911 right away if you have heavy bleeding or bleeding that does not stop.    Medications:  Take your medications, including blood thinners, unless your doctor tells you not to.  If you have stopped any other medicines, check with your nurse or provider  about when to restart them.  If you have PAIN or a TIGHTNESS in your chest, you MAY take Tylenol (acetaminophen) and if this does not help, you MAY take Advil (ibuprofen-400 mg with food).  If you have constipation or prone to constipation,  take a fiber supplement, ie metamucil or stool softners.    Call the A Fib RN if:  Chest pain not relieved by acetaminophen or ibuprofen  Difficulty swallowing and/or coughing up blood  Shortness of breath  Increased groin pain or a large or growing hard lump around the site.  Groin site is red, swollen, hot or tender.  Blood or fluid is draining from the groin site.  You have chills or a fever greater than 101 F (38 C).  Your leg feels numb, cool or changes color.  If groin pain is not relieved by Tylenol or Ibuprofen.  Recurrent irregular or fast heart rate lasting over 2-3 hours.  Any questions or concerns.    Heart rhythms:  You may have some irregular heartbeats. These feel very strong. They may make you feel that the A Fib is going to start again.  Give it time. The irregular beats should occur less often.    Follow Up Appointments:  October 10th at 130 pm with Kathleen Collins NP, [place]  January 5th at 10 am with June Christian PA-C in [facility]   A Fib clinic RN's Kandi Bennett [phone] (Mon-Fri, 8:00-4:30)   [phone] Option 2 (7 days a week) after hours for on call Cardiologist.

## 2023-10-03 NOTE — PROGRESS NOTES
Care Suites Admission Nursing Note    Patient Information  Name: Gaby Aguilar  Age: 63 year old  Reason for admission: afib ablation  Care Suites arrival time: 0730    Visitor Information  Name: n/a     Patient Admission/Assessment   Pre-procedure assessment complete: Yes  If abnormal assessment/labs, provider notified: N/A  NPO: Yes  Medications held per instructions/orders: Yes  Consent: obtained  If applicable, pregnancy test status: n/a  Patient oriented to room: Yes  Education/questions answered: Yes  Plan/other: proceed    Discharge Planning  Discharge name/phone number: [phone]  Overnight post sedation caregiver: spouse eilgio  Discharge location: home    Wilfrid Hinds RN         Received triage call from pt with c/o sob. She reports that for the past 2 weeks she has been dealing with sob on exertion. She states that she is able to walk about 25 ft and then has to stop and rest. She denies any associated symptoms such as chest pain or dizziness. She lives at [place] and does not monitor her bp and hr. Her weight has been stable at 118. She purposely put on weight over the winter as she reports that she was down to 110. Denies any swelling. No fever, cough, or cold like symptoms. She is able to speak in full sentences. Appt made for 06/01 with Dr Monge. Pt understands that if her symptoms worsen before her appt she needs to present to the ER for eval. She was agreeable.

## 2023-10-03 NOTE — ANESTHESIA CARE TRANSFER NOTE
Patient: Gaby Aguilar    Procedure: Procedure(s):  Ablation Atrial Fibrilation       Diagnosis: PAF  Diagnosis Additional Information: No value filed.    Anesthesia Type:   General     Note:    Oropharynx: oropharynx clear of all foreign objects and spontaneously breathing  Level of Consciousness: awake  Oxygen Supplementation: face mask  Level of Supplemental Oxygen (L/min / FiO2): 6  Independent Airway: airway patency satisfactory and stable  Dentition: dentition unchanged  Vital Signs Stable: post-procedure vital signs reviewed and stable  Report to RN Given: handoff report given  Patient transferred to: PACU    Handoff Report: Identifed the Patient, Identified the Reponsible Provider, Reviewed the pertinent medical history, Discussed the surgical course, Reviewed Intra-OP anesthesia mangement and issues during anesthesia, Set expectations for post-procedure period and Allowed opportunity for questions and acknowledgement of understanding      Vitals:  Vitals Value Taken Time   /84    Temp     Pulse 70    Resp 15    SpO2 98        Electronically Signed By: FERNANDEZ England CRNA  October 3, 2023  1:41 PM

## 2023-10-04 ENCOUNTER — TELEPHONE (OUTPATIENT)
Dept: CARDIOLOGY | Facility: CLINIC | Age: 63
End: 2023-10-04
Payer: COMMERCIAL

## 2023-10-04 NOTE — TELEPHONE ENCOUNTER
Spoke to patient in follow up to AF ablation on 10/4.  Patient reports feeling well.  Denies CP, SOB, lightheadedness or feeling dizzy.  Dressing intact on groin site.  Instructed patient to remove today and monitor.  Reviewed discharge instructions and follow up appointments.  Patient aware she is to take protonix 40mg po every day for 30 days.  Patient had no further questions.  Patient to call if she has any questions.  CECY Coronado

## 2023-10-06 LAB
ATRIAL RATE - MUSE: 61 BPM
ATRIAL RATE - MUSE: 70 BPM
DIASTOLIC BLOOD PRESSURE - MUSE: NORMAL MMHG
DIASTOLIC BLOOD PRESSURE - MUSE: NORMAL MMHG
INTERPRETATION ECG - MUSE: NORMAL
INTERPRETATION ECG - MUSE: NORMAL
P AXIS - MUSE: 56 DEGREES
P AXIS - MUSE: 64 DEGREES
PR INTERVAL - MUSE: 168 MS
PR INTERVAL - MUSE: 174 MS
QRS DURATION - MUSE: 78 MS
QRS DURATION - MUSE: 84 MS
QT - MUSE: 368 MS
QT - MUSE: 422 MS
QTC - MUSE: 397 MS
QTC - MUSE: 424 MS
R AXIS - MUSE: 49 DEGREES
R AXIS - MUSE: 66 DEGREES
SYSTOLIC BLOOD PRESSURE - MUSE: NORMAL MMHG
SYSTOLIC BLOOD PRESSURE - MUSE: NORMAL MMHG
T AXIS - MUSE: 24 DEGREES
T AXIS - MUSE: 54 DEGREES
VENTRICULAR RATE- MUSE: 61 BPM
VENTRICULAR RATE- MUSE: 70 BPM

## 2023-10-10 ENCOUNTER — OFFICE VISIT (OUTPATIENT)
Dept: CARDIOLOGY | Facility: CLINIC | Age: 63
End: 2023-10-10
Payer: COMMERCIAL

## 2023-10-10 VITALS
WEIGHT: 151.5 LBS | DIASTOLIC BLOOD PRESSURE: 84 MMHG | SYSTOLIC BLOOD PRESSURE: 133 MMHG | HEIGHT: 64 IN | BODY MASS INDEX: 25.86 KG/M2 | HEART RATE: 64 BPM | OXYGEN SATURATION: 99 %

## 2023-10-10 DIAGNOSIS — I48.0 PAROXYSMAL ATRIAL FIBRILLATION (H): Primary | ICD-10-CM

## 2023-10-10 PROCEDURE — 99215 OFFICE O/P EST HI 40 MIN: CPT | Performed by: NURSE PRACTITIONER

## 2023-10-10 PROCEDURE — 93000 ELECTROCARDIOGRAM COMPLETE: CPT | Performed by: NURSE PRACTITIONER

## 2023-10-10 NOTE — PROGRESS NOTES
Electrophysiology Clinic Progress Note  Gaby Aguilar MRN# [id number]   YOB: 1960 Age: 63 year old     Primary cardiologist: Dr. Araujo (general) and Dr. Way (EP)    Reason for visit: Atrial fibrillation and tachybrady syndrome    History of presenting illness:    Gaby Aguilar is a pleasant 63 year old patient with past medical history significant for:    Paroxysmal atrial fibrillation and paroxysmal atrial flutter: s/p wide area PVI and extensive CTI without bidirectional block on 10/3/41824  [id number] Score 1  Asymptomatic moderate tachy-zach syndrome: conversion pauses on monitor   GERD    In 7/2022 she evaluated by Dr. Way every Zio patch revealed paroxysmal atrial fibrillation with a 10% burden and upon termination of atrial arrhythmias that she had sinus pauses up to 3.5 seconds and some while awake.  It was recommended that her beta-blocker be decreased from 37.5 mg twice daily of Toprol-XL to 25 mg daily and to alert us if she is having any concerns for syncope or presyncope.     She had a repeat monitor and 6/2023 that did reveal ongoing paroxysms of atrial fibrillation/atrial flutter with an 8% burden.  She again had sinus conversion pauses up to 3.2 seconds.  Dr. Wya reviewed the findings and requested that we offer the patient either an atrial fibrillation ablation versus a permanent pacemaker to allow for A-fib control with RVR.  Unfortunately, if A-fib is left untreated she would be at risk for syncope from bradycardia plus or cardiomyopathy from tachycardia.    She was seen in follow-up on 8/14/2023 by June Christian PA-C and they discussed proceeding with a pacemaker versus atrial fibrillation ablation.  The patient elected for the latter.  It was recommended that she start Eliquis 5 mg twice daily approximately 3 weeks prior to the procedure and will need to continue 2 months post.  On 10/3/2023 she underwent a successful wide area PVI ablation. RF energy was limited in  anterior RSPV to phrenic nerve proximity and left PV antrum due to esophageal proximity. She also underwent a CTI ablation.  The CTI ablation was extensive without bidirectional block given prominent eustachian ridge.    Today she returns for her 1 week follow-up post atrial fibrillation ablation.  Thankfully, she has had no symptomatic recurrence of atrial arrhythmias.  Her groin site is clean dry and intact with small but the site.  Feels mildly tender on palpation.  And bruising is improving.    Diagnotic studies:  EKG (10/10/2023): SR with PAC  Zio Patch (6/26-7/3/2023): Paroxysmal atrial fibrillation/atrial flutter with an 8% burden.  Longest episode lasting 2 hours and 56 minutes with episodes of RVR.  Pauses up to 3.2 seconds upon AF conversion.  Zio Patch (6/2022): Paroxysmal atrial fibrillation and atrial flutter with 10% burden longest event lasting 1 hour and 41 minutes. Upon termination of atrial arrhythmias she had sinus pauses up to 3.5 seconds (some while awake)  Echocardiogram (8/2022): LVEF of 55 to 60% without wall motion abnormalities.  Trace to mild MR and TR.          Assessment and Plan:     ASSESSMENT:    Paroxysmal atrial arrhythmias with conversion pauses  [id number] score of 1 (gender)  Recently started on Eliquis on 8/14/2023  S/p wide area PVI and extensive CTI without bidirectional block on 10/3/66965    Asymptomatic tachybradycardia syndrome  Patient is asymptomatic with atrial fibrillation as well as with conversion pauses    PLAN:     Continue PPI x 1 month post ablation and Eliquis for 3 months post ablation  Follow up in January with EKG as previously scheduled       Orders this Visit:  Orders Placed This Encounter   Procedures    EKG 12-lead complete w/read - Clinics (performed today)     No orders of the defined types were placed in this encounter.    There are no discontinued medications.    Today's clinic visit entailed:  Review of the result(s) of each unique test - EKG,  "Echo, BMP   Ordering of each unique test  Prescription drug management  40 minutes spent by me on the date of the encounter doing chart review, history and exam, documentation and further activities per the note  Provider  Link to [facility] Help Grid     The level of medical decision making during this visit was of moderate complexity.           Review of Systems:     Review of Systems:  Skin:        Eyes:       ENT:       Respiratory:  Negative    Cardiovascular:  Negative;Negative for;palpitations;chest pain;edema;dizziness;lightheadedness;syncope or near-syncope;exercise intolerance Positive for;fatigue  Gastroenterology: Positive for heartburn  Genitourinary:       Musculoskeletal:       Neurologic:       Psychiatric:       Heme/Lymph/Imm:       Endocrine:  Negative thyroid disorder;diabetes            Physical Exam:     Vitals: /84 (BP Location: Left arm, Patient Position: Sitting)   Pulse 64   Ht 1.626 m (5' 4\")   Wt 68.7 kg (151 lb 8 oz)   LMP 05/26/2010   SpO2 99%   BMI 26.00 kg/m    Constitutional: Well nourished and in no apparent distress.  Eyes: Pupils equal, round. Sclerae anicteric.   HEENT: Normocephalic, atraumatic.   Neck: Supple.  Respiratory: Breathing non-labored. Lungs clear to auscultation bilaterally. No crackles, wheezes, rhonchi, or rales.  Cardiovascular: Regular rate and rhythm, normal S1 and S2. No murmur, rub, or gallop.  Skin: Warm, dry. No rashes, cyanosis, or xanthelasma.  Extremities: RFV site CDI, mild ecchymosis with mild tenderness  Neurologic: No gross motor deficits. Alert, awake, and oriented to person, place and time.  Psychiatric: Affect appropriate.        CURRENT MEDICATIONS:  Current Outpatient Medications   Medication Sig Dispense Refill    acetaminophen (TYLENOL) 500 MG tablet Take 500-1,000 mg by mouth every 6 hours as needed for mild pain      apixaban ANTICOAGULANT (ELIQUIS) 5 MG tablet Take 1 tablet (5 mg) by mouth 2 times daily 60 tablet 4    Ascorbic Acid " (VITAMIN C) 500 MG CAPS Unknown dose at this time      biotin 1000 MCG TABS tablet Take 1,000 mcg by mouth daily      Calcium Carbonate-Vitamin D (CALCIUM + D) 600-200 MG-UNIT per tablet Take 2 tablets by mouth daily       ibuprofen (ADVIL/MOTRIN) 200 MG tablet Take 400 mg by mouth every 4 hours as needed for mild pain      metoprolol succinate ER (TOPROL XL) 25 MG 24 hr tablet Take 1 tablet (25 mg) by mouth daily 90 tablet 3    Multiple Vitamins-Calcium (ONE-A-DAY WOMENS FORMULA) TABS Take 1 tablet by mouth daily.      pantoprazole (PROTONIX) 40 MG EC tablet Take 1 tablet (40 mg) by mouth daily 30 tablet 0    UNABLE TO FIND MEDICATION NAME: [employer] Allegra      VITAMIN E NATURAL PO          ALLERGIES  Allergies   Allergen Reactions    Sulfa Antibiotics Hives         PAST MEDICAL HISTORY:  Past Medical History:   Diagnosis Date    Allergic rhinitis 05/27/2011     Problem list name updated by automated process. Provider to review    Arthritis     hands    Diffuse cystic mastopathy 12/27/2012    History of basal cell carcinoma     Hx of atrial flutter 12/14/2018    Symptomatic menopausal or female climacteric states 01/09/2008       PAST SURGICAL HISTORY:  Past Surgical History:   Procedure Laterality Date    EP ABLATION FOCAL AFIB N/A 10/3/2023    Procedure: Ablation Atrial Fibrilation;  Surgeon: Jessica Way MD;  Location:  HEART CARDIAC CATH LAB    HC CORRECT BUNION,SIMPLE Right 1978    HC TOOTH EXTRACTION W/FORCEP  age 20    LAPAROSCOPIC HYSTERECTOMY TOTAL, BILATERAL SALPINGO-OOPHORECTOMY, COMBINED Bilateral 3/6/2019    Procedure: Single port total laparoscopic hysterectomy bilateral salpingo-oopherectomy;  Surgeon: Laisha Kathleen MD;  Location:  OR       FAMILY HISTORY:  Family History   Problem Relation Age of Onset    Cancer Mother         presacral cancer    Hypertension Mother     Hypertension Father     Prostate Cancer Father     Diabetes Maternal Grandmother     Breast Cancer Maternal  Aunt     GUMARO No family hx of     Cerebrovascular Disease No family hx of     Cancer - colorectal No family hx of        SOCIAL HISTORY:  Social History     Socioeconomic History    Marital status:      Spouse name: None    Number of children: 2    Years of education: None    Highest education level: None   Occupational History     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   Tobacco Use    Smoking status: Former     Packs/day: 0.50     Years: 6.00     Pack years: 3.00     Types: Cigarettes     Quit date: 3/26/1982     Years since quittin.5    Smokeless tobacco: Never   Substance and Sexual Activity    Alcohol use: Yes     Alcohol/week: 7.0 - 9.0 standard drinks of alcohol     Types: 7 - 9 Standard drinks or equivalent per week     Comment: 7 drinks per week    Drug use: No    Sexual activity: Yes     Partners: Male     Birth control/protection: Post-menopausal     Comment:    Other Topics Concern     Service No    Blood Transfusions No    Caffeine Concern Yes    Occupational Exposure No    Hobby Hazards No    Sleep Concern No    Stress Concern No    Weight Concern No    Special Diet No    Exercise Yes    Seat Belt Yes    Self-Exams Yes

## 2023-10-10 NOTE — LETTER
10/10/2023    Constanza Johnson MD  [address]  [facility] [address]    RE: Gaby Aguilar       Dear Colleague,     I had the pleasure of seeing Gaby Aguilar in the [facility].    Electrophysiology Clinic Progress Note  Gaby Aguilar MRN# [id number]   YOB: 1960 Age: 63 year old     Primary cardiologist: Dr. Araujo (general) and Dr. Way (EP)    Reason for visit: Atrial fibrillation and tachybrady syndrome    History of presenting illness:    Gaby Aguilar is a pleasant 63 year old patient with past medical history significant for:    Paroxysmal atrial fibrillation and paroxysmal atrial flutter: s/p wide area PVI and extensive CTI without bidirectional block on 10/3/23226  [id number] Score 1  Asymptomatic moderate tachy-zach syndrome: conversion pauses on monitor   GERD    In 7/2022 she evaluated by Dr. Way every Zio patch revealed paroxysmal atrial fibrillation with a 10% burden and upon termination of atrial arrhythmias that she had sinus pauses up to 3.5 seconds and some while awake.  It was recommended that her beta-blocker be decreased from 37.5 mg twice daily of Toprol-XL to 25 mg daily and to alert us if she is having any concerns for syncope or presyncope.     She had a repeat monitor and 6/2023 that did reveal ongoing paroxysms of atrial fibrillation/atrial flutter with an 8% burden.  She again had sinus conversion pauses up to 3.2 seconds.  Dr. Way reviewed the findings and requested that we offer the patient either an atrial fibrillation ablation versus a permanent pacemaker to allow for A-fib control with RVR.  Unfortunately, if A-fib is left untreated she would be at risk for syncope from bradycardia plus or cardiomyopathy from tachycardia.    She was seen in follow-up on 8/14/2023 by June Christian PA-C and they discussed proceeding with a pacemaker versus atrial fibrillation ablation.  The patient elected for the latter.  It was recommended that she start  Eliquis 5 mg twice daily approximately 3 weeks prior to the procedure and will need to continue 2 months post.  On 10/3/2023 she underwent a successful wide area PVI ablation. RF energy was limited in anterior RSPV to phrenic nerve proximity and left PV antrum due to esophageal proximity. She also underwent a CTI ablation.  The CTI ablation was extensive without bidirectional block given prominent eustachian ridge.    Today she returns for her 1 week follow-up post atrial fibrillation ablation.  Thankfully, she has had no symptomatic recurrence of atrial arrhythmias.  Her groin site is clean dry and intact with small but the site.  Feels mildly tender on palpation.  And bruising is improving.    Diagnotic studies:  EKG (10/10/2023): SR with PAC  Zio Patch (6/26-7/3/2023): Paroxysmal atrial fibrillation/atrial flutter with an 8% burden.  Longest episode lasting 2 hours and 56 minutes with episodes of RVR.  Pauses up to 3.2 seconds upon AF conversion.  Zio Patch (6/2022): Paroxysmal atrial fibrillation and atrial flutter with 10% burden longest event lasting 1 hour and 41 minutes. Upon termination of atrial arrhythmias she had sinus pauses up to 3.5 seconds (some while awake)  Echocardiogram (8/2022): LVEF of 55 to 60% without wall motion abnormalities.  Trace to mild MR and TR.          Assessment and Plan:     ASSESSMENT:    Paroxysmal atrial arrhythmias with conversion pauses  [id number] score of 1 (gender)  Recently started on Eliquis on 8/14/2023  S/p wide area PVI and extensive CTI without bidirectional block on 10/3/01955    Asymptomatic tachybradycardia syndrome  Patient is asymptomatic with atrial fibrillation as well as with conversion pauses    PLAN:     Continue PPI x 1 month post ablation and Eliquis for 3 months post ablation  Follow up in January with EKG as previously scheduled       Orders this Visit:  Orders Placed This Encounter   Procedures    EKG 12-lead complete w/read - Clinics (performed  "today)     No orders of the defined types were placed in this encounter.    There are no discontinued medications.    Today's clinic visit entailed:  Review of the result(s) of each unique test - EKG, Echo, BMP   Ordering of each unique test  Prescription drug management  40 minutes spent by me on the date of the encounter doing chart review, history and exam, documentation and further activities per the note  Provider  Link to [facility] Help Grid     The level of medical decision making during this visit was of moderate complexity.           Review of Systems:     Review of Systems:  Skin:        Eyes:       ENT:       Respiratory:  Negative    Cardiovascular:  Negative;Negative for;palpitations;chest pain;edema;dizziness;lightheadedness;syncope or near-syncope;exercise intolerance Positive for;fatigue  Gastroenterology: Positive for heartburn  Genitourinary:       Musculoskeletal:       Neurologic:       Psychiatric:       Heme/Lymph/Imm:       Endocrine:  Negative thyroid disorder;diabetes            Physical Exam:     Vitals: /84 (BP Location: Left arm, Patient Position: Sitting)   Pulse 64   Ht 1.626 m (5' 4\")   Wt 68.7 kg (151 lb 8 oz)   LMP 05/26/2010   SpO2 99%   BMI 26.00 kg/m    Constitutional: Well nourished and in no apparent distress.  Eyes: Pupils equal, round. Sclerae anicteric.   HEENT: Normocephalic, atraumatic.   Neck: Supple.  Respiratory: Breathing non-labored. Lungs clear to auscultation bilaterally. No crackles, wheezes, rhonchi, or rales.  Cardiovascular: Regular rate and rhythm, normal S1 and S2. No murmur, rub, or gallop.  Skin: Warm, dry. No rashes, cyanosis, or xanthelasma.  Extremities: RFV site CDI, mild ecchymosis with mild tenderness  Neurologic: No gross motor deficits. Alert, awake, and oriented to person, place and time.  Psychiatric: Affect appropriate.        CURRENT MEDICATIONS:  Current Outpatient Medications   Medication Sig Dispense Refill    acetaminophen (TYLENOL) 500 " MG tablet Take 500-1,000 mg by mouth every 6 hours as needed for mild pain      apixaban ANTICOAGULANT (ELIQUIS) 5 MG tablet Take 1 tablet (5 mg) by mouth 2 times daily 60 tablet 4    Ascorbic Acid (VITAMIN C) 500 MG CAPS Unknown dose at this time      biotin 1000 MCG TABS tablet Take 1,000 mcg by mouth daily      Calcium Carbonate-Vitamin D (CALCIUM + D) 600-200 MG-UNIT per tablet Take 2 tablets by mouth daily       ibuprofen (ADVIL/MOTRIN) 200 MG tablet Take 400 mg by mouth every 4 hours as needed for mild pain      metoprolol succinate ER (TOPROL XL) 25 MG 24 hr tablet Take 1 tablet (25 mg) by mouth daily 90 tablet 3    Multiple Vitamins-Calcium (ONE-A-DAY WOMENS FORMULA) TABS Take 1 tablet by mouth daily.      pantoprazole (PROTONIX) 40 MG EC tablet Take 1 tablet (40 mg) by mouth daily 30 tablet 0    UNABLE TO FIND MEDICATION NAME: [employer] Allegra      VITAMIN E NATURAL PO          ALLERGIES  Allergies   Allergen Reactions    Sulfa Antibiotics Hives         PAST MEDICAL HISTORY:  Past Medical History:   Diagnosis Date    Allergic rhinitis 05/27/2011     Problem list name updated by automated process. Provider to review    Arthritis     hands    Diffuse cystic mastopathy 12/27/2012    History of basal cell carcinoma     Hx of atrial flutter 12/14/2018    Symptomatic menopausal or female climacteric states 01/09/2008       PAST SURGICAL HISTORY:  Past Surgical History:   Procedure Laterality Date    EP ABLATION FOCAL AFIB N/A 10/3/2023    Procedure: Ablation Atrial Fibrilation;  Surgeon: Jessica Way MD;  Location:  HEART CARDIAC CATH LAB    HC CORRECT BUNION,SIMPLE Right 1978    HC TOOTH EXTRACTION W/FORCEP  age 20    LAPAROSCOPIC HYSTERECTOMY TOTAL, BILATERAL SALPINGO-OOPHORECTOMY, COMBINED Bilateral 3/6/2019    Procedure: Single port total laparoscopic hysterectomy bilateral salpingo-oopherectomy;  Surgeon: Laisha Kathleen MD;  Location:  OR       FAMILY HISTORY:  Family History   Problem  Relation Age of Onset    Cancer Mother         presacral cancer    Hypertension Mother     Hypertension Father     Prostate Cancer Father     Diabetes Maternal Grandmother     Breast Cancer Maternal Aunt     C.A.D. 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   Tobacco Use    Smoking status: Former     Packs/day: 0.50     Years: 6.00     Pack years: 3.00     Types: Cigarettes     Quit date: 3/26/1982     Years since quittin.5    Smokeless tobacco: Never   Substance and Sexual Activity    Alcohol use: Yes     Alcohol/week: 7.0 - 9.0 standard drinks of alcohol     Types: 7 - 9 Standard drinks or equivalent per week     Comment: 7 drinks per week    Drug use: No    Sexual activity: Yes     Partners: Male     Birth control/protection: Post-menopausal     Comment:    Other Topics Concern     Service No    Blood Transfusions No    Caffeine Concern Yes    Occupational Exposure No    Hobby Hazards No    Sleep Concern No    Stress Concern No    Weight Concern No    Special Diet No    Exercise Yes    Seat Belt Yes    Self-Exams Yes               Thank you for allowing me to participate in the care of your patient.      Sincerely,     FERNANDEZ [facility]  cc:   No referring provider defined for this encounter.

## 2023-10-16 ENCOUNTER — MYC MEDICAL ADVICE (OUTPATIENT)
Dept: CARDIOLOGY | Facility: CLINIC | Age: 63
End: 2023-10-16
Payer: COMMERCIAL

## 2023-10-16 DIAGNOSIS — I48.0 PAROXYSMAL ATRIAL FIBRILLATION (H): ICD-10-CM

## 2023-10-16 RX ORDER — METOPROLOL SUCCINATE 25 MG/1
25 TABLET, EXTENDED RELEASE ORAL DAILY
Qty: 90 TABLET | Refills: 0 | Status: SHIPPED | OUTPATIENT
Start: 2023-10-16 | End: 2024-01-12

## 2023-10-18 ENCOUNTER — OFFICE VISIT (OUTPATIENT)
Dept: FAMILY MEDICINE | Facility: CLINIC | Age: 63
End: 2023-10-18

## 2023-10-18 VITALS
SYSTOLIC BLOOD PRESSURE: 128 MMHG | WEIGHT: 151 LBS | OXYGEN SATURATION: 98 % | TEMPERATURE: 98 F | DIASTOLIC BLOOD PRESSURE: 80 MMHG | BODY MASS INDEX: 25.92 KG/M2 | HEART RATE: 70 BPM

## 2023-10-18 DIAGNOSIS — L30.9 VULVAR DERMATITIS: Primary | ICD-10-CM

## 2023-10-18 PROCEDURE — 99213 OFFICE O/P EST LOW 20 MIN: CPT | Performed by: PHYSICIAN ASSISTANT

## 2023-10-18 RX ORDER — TRIAMCINOLONE ACETONIDE 1 MG/G
CREAM TOPICAL 2 TIMES DAILY
Qty: 30 G | Refills: 0 | Status: SHIPPED | OUTPATIENT
Start: 2023-10-18 | End: 2024-04-30

## 2023-10-18 NOTE — NURSING NOTE
Chief Complaint   Patient presents with    Vaginal Problem     Pt here for labial itchiness. More so at night. Has had it for 9 months to a year. Has done the OTC hydrocortisone cream. Symptoms include itchiness and feels rough in the area. Not as bothersome during the day.    Pre-visit Screening:  Immunizations:  up to date  Colonoscopy:  is due and to be scheduled by patient for later completion  Mammogram: is up to date  Asthma Action Test/Plan:  na  PHQ9:  na  GAD7:  na  Questioned patient about current smoking habits Pt. quit smoking some time ago.  Ok to leave detailed message on voice mail for today's visit only yes, phone # [phone]

## 2023-10-18 NOTE — PROGRESS NOTES
CC: Vulvar itching    History:  Has been experiencing vulvar itching for the past 9-12 months. Has been happening every day since onset. Usually the worst at night, and seems relatively okay during the day. No discharge. Does notice some bumps in the area, but not extra itchy at that time. Not sexually active, no concerns for STD. Does have urinary frequency which is chronic for her, but no incontinence.     Has used OTC hydrocortisone cream without significant relief. Started 3 day yeast infection treatment finished last night. No improvement.     PMH, MEDICATIONS, ALLERGIES, SOCIAL AND FAMILY HISTORY in [facility] and reviewed by me personally.    ROS negative other than the symptoms noted above in the HPI.      Examination   /80 (BP Location: Left arm, Patient Position: Sitting, Cuff Size: Adult Regular)   Pulse 70   Temp 98  F (36.7  C) (Temporal)   Wt 68.5 kg (151 lb)   LMP 05/26/2010   SpO2 98%   BMI 25.92 kg/m       Constitutional: Sitting comfortably, in no acute distress. Vital signs noted  Neck:  no adenopathy, trachea midline and normal to palpation, thyroid normal to palpation  Cardiovascular:  regular rate and rhythm, no murmurs, clicks, or gallops  Respiratory:  normal respiratory rate and rhythm, lungs clear to auscultation  : External genitalia shows mild erythema and scaling of labia majora. Labia minora, introitus without abnormality other than mild atrophic changes. Vaginal exam not done.   SKIN: No jaundice/pallor/rash.   Psychiatric: mentation appears normal and affect normal/bright    A/P    ICD-10-CM    1. Vulvar dermatitis  L30.9 triamcinolone (KENALOG) 0.1 % external cream          DISCUSSION:  Vulvar dermatitis:  Symptoms and exam consistent with vulvar dermatitis. Recommended trial of triamcinolone cream twice daily for up to 2 weeks. Asked her to contact me in 1 week if not seeing improvement, and could consider stronger cream and possible referral to OBGYN for possible biopsy.      follow up visit: As needed    [facility]

## 2023-10-27 ENCOUNTER — MYC MEDICAL ADVICE (OUTPATIENT)
Dept: FAMILY MEDICINE | Facility: CLINIC | Age: 63
End: 2023-10-27

## 2024-01-04 NOTE — PROGRESS NOTES
Electrophysiology Clinic Progress Note    Gaby Aguilar MRN# [id number]   YOB: 1960 Age: 63 year old     Primary cardiology team: Dr. Araujo (general), Dr. Way (EP)         Assessment and Plan     In summary, Gaby Aguilar presents today for follow up on moderate tachy-zach syndrome, s/p AF ablation on 10/3/23. From this standpoint, she feels well without recurrence noted on her monitor.  She may stop Eliquis at this time for a [id number] score of 0 (encouraged her to set aside, not discard). She will continue to monitor on her kardia mobile and alert us with recurrence.   BP is elevated in clinic today. She'll complete ambulatory BP's x 1 week. RN call after that.   Return to see Dr. Way in 6 months.    June Christian PA-C  [facility]         History of Presenting Illness     Gaby Aguilar is a pleasant 63 year old patient with a pertinent history of the following -   Paroxysmal atrial fibrillation and paroxysmal atrial flutter: s/p wide area PVI and extensive CTI without bidirectional block given prominent eustachian ridge on 10/3/07236 with Dr. Way.  Sick sinus syndrome with conversion pauses.     Today, Gaby presents to clinic for a 3 month follow up visit after her ablation as above. She states she's feeling well. She didn't have reliable sxs previously with her arrhythmias, but the concern was for development of syncope from bradycardia, or a cardiomyopathy from tachycardia if left untreated. She does check her kardia mobile monitor every 2-3 days and has not had any AF alerts. No bleeding issues.   BP today is elevated in clinic. She has a BP cuff at home.  In the past, the patient has tested negative for sleep apnea, does not drink excess alcohol and does not have family history of AF. Normal LVEF (8/2022 TTE). EKG today shows SR at 82 bpm.          Review of Systems     12-pt ROS is negative except for as noted in the HPI.          Physical Exam     Vitals: BP  "(!) 150/74 (BP Location: Right arm, Patient Position: Sitting, Cuff Size: Adult Regular)   Pulse 82   Ht 1.626 m (5' 4\")   Wt 68.9 kg (152 lb)   LMP 05/26/2010   SpO2 96%   BMI 26.09 kg/m    Wt Readings from Last 10 Encounters:   01/05/24 68.9 kg (152 lb)   10/18/23 68.5 kg (151 lb)   10/10/23 68.7 kg (151 lb 8 oz)   10/03/23 68.9 kg (151 lb 12.8 oz)   09/28/23 69.4 kg (153 lb)   08/14/23 68.5 kg (151 lb)   01/16/23 67.9 kg (149 lb 12.8 oz)   08/15/22 67 kg (147 lb 12.8 oz)   07/21/22 67.1 kg (148 lb)   06/02/22 68 kg (150 lb)       Constitutional:  Patient is pleasant, alert, cooperative, and in NAD.  HEENT:  NCAT. PERRLA. EOM's intact.   Neck:  CVP appears normal. No carotid bruits.   Pulmonary: Normal respiratory effort. CTAB.   Cardiac: RRR, normal S1/S2, no S3/S4, no murmur or rub.   Abdomen:  Non-tender abdomen, no hepatosplenomegaly appreciated.   Vascular: Pulses in the upper and lower extremities are 2+ and equal bilaterally. R femoral access site C/D/I.   Extremities: No edema, erythema, cyanosis or tenderness appreciated.  Skin:  No rashes or lesions appreciated.   Neurological:  No gross motor or sensory deficits.   Psych: Appropriate affect.          Data   Labs reviewed:  Recent Labs   Lab Test 01/16/23  0000 05/23/21  0939 02/18/21  1139 02/18/21  0947 06/12/19  0914 12/07/18  1725    109* 110  --  98  --    HDL 46 45* 51  --  44*  --    NHDL  --  140*  --   --  139*  --    CHOL 206* 185 207*  --  183  --    TRIG 221* 155* 228*  --  207*  --    TSH  --  1.30  --  1.95  --  1.58       Lab Results   Component Value Date    WBC 5.6 10/03/2023    WBC 5.3 05/23/2021    RBC 4.17 10/03/2023    RBC 4.12 05/23/2021    HGB 13.6 10/03/2023    HGB 13.3 05/23/2021    HCT 38.9 10/03/2023    HCT 39.4 05/23/2021    MCV 93 10/03/2023    MCV 96 05/23/2021    MCH 32.6 10/03/2023    MCH 32.3 05/23/2021    MCHC 35.0 10/03/2023    MCHC 33.8 05/23/2021    RDW 12.3 10/03/2023    RDW 12.3 05/23/2021     " "10/03/2023     05/23/2021       Lab Results   Component Value Date     10/03/2023    .0 01/16/2023    POTASSIUM 4.5 10/03/2023    POTASSIUM 4.65 01/16/2023    CHLORIDE 104 10/03/2023    CHLORIDE 100.8 01/16/2023    CO2 28 10/03/2023    CO2 31.7 01/16/2023    ANIONGAP 8 10/03/2023    ANIONGAP 3 05/23/2021     (H) 10/03/2023     (A) 01/16/2023    BUN 13.3 10/03/2023    BUN 12 01/16/2023    BUN 13.8 01/16/2023    CR 0.78 10/03/2023    CR 0.87 01/16/2023    GFRESTIMATED 85 10/03/2023    GFRESTIMATED 86 05/23/2021    GFRESTBLACK >90 05/23/2021    VINH 9.8 10/03/2023    VINH 9.4 01/16/2023      Lab Results   Component Value Date    AST 15 11/09/2016    ALT 16 11/09/2016       Lab Results   Component Value Date    A1C 5.1 01/16/2023       No results found for: \"INR\"         Problem List     Patient Active Problem List   Diagnosis    Allergic rhinitis    Health Care Home    ACP (advance care planning)    Diffuse cystic mastopathy    Atrophic vaginitis    Hx of atrial flutter    Atrial flutter (H)--followed by cardiology    Esophageal reflux    Localized osteoarthritis of hand    Pitted nails    Mass of upper inner quadrant of right breast            Medications     Current Outpatient Medications   Medication Sig Dispense Refill    acetaminophen (TYLENOL) 500 MG tablet Take 500-1,000 mg by mouth every 6 hours as needed for mild pain      apixaban ANTICOAGULANT (ELIQUIS) 5 MG tablet Take 1 tablet (5 mg) by mouth 2 times daily 60 tablet 4    Ascorbic Acid (VITAMIN C) 500 MG CAPS Unknown dose at this time      biotin 1000 MCG TABS tablet Take 1,000 mcg by mouth daily      Calcium Carbonate-Vitamin D (CALCIUM + D) 600-200 MG-UNIT per tablet Take 2 tablets by mouth daily       ibuprofen (ADVIL/MOTRIN) 200 MG tablet Take 400 mg by mouth every 4 hours as needed for mild pain      metoprolol succinate ER (TOPROL XL) 25 MG 24 hr tablet Take 1 tablet (25 mg) by mouth daily 90 tablet 0    Multiple " Vitamins-Calcium (ONE-A-DAY WOMENS FORMULA) TABS Take 1 tablet by mouth daily.      UNABLE TO FIND MEDICATION NAME: [employer] Allegra      VITAMIN E NATURAL PO               Past Medical History     Past Medical History:   Diagnosis Date    Allergic rhinitis 2011     Problem list name updated by automated process. Provider to review    Arthritis     hands    Diffuse cystic mastopathy 2012    History of basal cell carcinoma     Hx of atrial flutter 2018    Symptomatic menopausal or female climacteric states 2008     Past Surgical History:   Procedure Laterality Date    EP ABLATION FOCAL AFIB N/A 10/3/2023    Procedure: Ablation Atrial Fibrilation;  Surgeon: Jessica Way MD;  Location:  HEART CARDIAC CATH LAB    HC CORRECT BUNION,SIMPLE Right     HC TOOTH EXTRACTION W/FORCEP  age 20    LAPAROSCOPIC HYSTERECTOMY TOTAL, BILATERAL SALPINGO-OOPHORECTOMY, COMBINED Bilateral 3/6/2019    Procedure: Single port total laparoscopic hysterectomy bilateral salpingo-oopherectomy;  Surgeon: Laisha Kathleen MD;  Location: [facility] OR     Family History   Problem Relation Age of Onset    Cancer Mother         presacral cancer    Hypertension Mother     Hypertension Father     Prostate Cancer Father     Diabetes Maternal Grandmother     Breast Cancer Maternal Aunt     C.A.D. 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   Tobacco Use    Smoking status: Former     Packs/day: 0.50     Years: 6.00     Additional pack years: 0.00     Total pack years: 3.00     Types: Cigarettes     Quit date: 3/26/1982     Years since quittin.8    Smokeless tobacco: Never    Substance and Sexual Activity    Alcohol use: Yes     Alcohol/week: 7.0 standard drinks of alcohol     Types: 7 Standard drinks or equivalent per week     Comment: 7 drinks per week    Drug use: No    Sexual activity: Yes     Partners: Male     Birth control/protection: Post-menopausal     Comment:    Other Topics Concern     Service No    Blood Transfusions No    Caffeine Concern Yes    Occupational Exposure No    Hobby Hazards No    Sleep Concern No    Stress Concern No    Weight Concern No    Special Diet No    Back Care Not Asked    Exercise Yes    Bike Helmet Not Asked    Seat Belt Yes    Self-Exams Yes   Social History Narrative    Not on file     Social Determinants of Health     Financial Resource Strain: Not on file   Food Insecurity: Not on file   Transportation Needs: Not on file   Physical Activity: Not on file   Stress: Not on file   Social Connections: Not on file   Interpersonal Safety: Not on file   Housing Stability: Not on file            Allergies   Sulfa antibiotics    Today's clinic visit entailed:  Review of the result(s) of each unique test - EKG, ablation procedure  Prescription drug management  I spent a total of 30 minutes on the day of the visit.   Time spent by me doing chart review, history and exam, documentation and further activities per the note  Provider  Link to MDM Help Grid     The level of medical decision making during this visit was of moderate complexity.

## 2024-01-05 ENCOUNTER — OFFICE VISIT (OUTPATIENT)
Dept: CARDIOLOGY | Facility: CLINIC | Age: 64
End: 2024-01-05
Payer: COMMERCIAL

## 2024-01-05 VITALS
SYSTOLIC BLOOD PRESSURE: 150 MMHG | WEIGHT: 152 LBS | OXYGEN SATURATION: 96 % | BODY MASS INDEX: 25.95 KG/M2 | HEART RATE: 82 BPM | HEIGHT: 64 IN | DIASTOLIC BLOOD PRESSURE: 74 MMHG

## 2024-01-05 DIAGNOSIS — I48.0 PAROXYSMAL ATRIAL FIBRILLATION (H): ICD-10-CM

## 2024-01-05 PROCEDURE — 99214 OFFICE O/P EST MOD 30 MIN: CPT | Performed by: PHYSICIAN ASSISTANT

## 2024-01-05 PROCEDURE — 93000 ELECTROCARDIOGRAM COMPLETE: CPT | Performed by: PHYSICIAN ASSISTANT

## 2024-01-05 NOTE — LETTER
1/5/2024    Constanza Johnson MD  [address]  [facility] [address]    RE: Gaby Aguilar       Dear Colleague,     I had the pleasure of seeing Gaby Aguilar in the [facility].    Electrophysiology Clinic Progress Note    Gaby Aguilar MRN# [id number]   YOB: 1960 Age: 63 year old     Primary cardiology team: Dr. Araujo (general), Dr. Way (EP)         Assessment and Plan     In summary, Gaby Aguilar presents today for follow up on moderate tachy-zach syndrome, s/p AF ablation on 10/3/23. From this standpoint, she feels well without recurrence noted on her monitor.  She may stop Eliquis at this time for a [id number] score of 0 (encouraged her to set aside, not discard). She will continue to monitor on her kardia mobile and alert us with recurrence.   BP is elevated in clinic today. She'll complete ambulatory BP's x 1 week. RN call after that.   Return to see Dr. Way in 6 months.    June Christian PA-C  [facility] - [facility] Clinic         History of Presenting Illness     Gaby Aguilar is a pleasant 63 year old patient with a pertinent history of the following -   Paroxysmal atrial fibrillation and paroxysmal atrial flutter: s/p wide area PVI and extensive CTI without bidirectional block given prominent eustachian ridge on 10/3/95759 with Dr. Way.  Sick sinus syndrome with conversion pauses.     Today, Gaby presents to clinic for a 3 month follow up visit after her ablation as above. She states she's feeling well. She didn't have reliable sxs previously with her arrhythmias, but the concern was for development of syncope from bradycardia, or a cardiomyopathy from tachycardia if left untreated. She does check her kardia mobile monitor every 2-3 days and has not had any AF alerts. No bleeding issues.   BP today is elevated in clinic. She has a BP cuff at home.  In the past, the patient has tested negative for sleep apnea, does not drink excess alcohol and does not  "have family history of AF. Normal LVEF (8/2022 TTE). EKG today shows SR at 82 bpm.          Review of Systems     12-pt ROS is negative except for as noted in the HPI.          Physical Exam     Vitals: BP (!) 150/74 (BP Location: Right arm, Patient Position: Sitting, Cuff Size: Adult Regular)   Pulse 82   Ht 1.626 m (5' 4\")   Wt 68.9 kg (152 lb)   LMP 05/26/2010   SpO2 96%   BMI 26.09 kg/m    Wt Readings from Last 10 Encounters:   01/05/24 68.9 kg (152 lb)   10/18/23 68.5 kg (151 lb)   10/10/23 68.7 kg (151 lb 8 oz)   10/03/23 68.9 kg (151 lb 12.8 oz)   09/28/23 69.4 kg (153 lb)   08/14/23 68.5 kg (151 lb)   01/16/23 67.9 kg (149 lb 12.8 oz)   08/15/22 67 kg (147 lb 12.8 oz)   07/21/22 67.1 kg (148 lb)   06/02/22 68 kg (150 lb)       Constitutional:  Patient is pleasant, alert, cooperative, and in NAD.  HEENT:  NCAT. PERRLA. EOM's intact.   Neck:  CVP appears normal. No carotid bruits.   Pulmonary: Normal respiratory effort. CTAB.   Cardiac: RRR, normal S1/S2, no S3/S4, no murmur or rub.   Abdomen:  Non-tender abdomen, no hepatosplenomegaly appreciated.   Vascular: Pulses in the upper and lower extremities are 2+ and equal bilaterally. R femoral access site C/D/I.   Extremities: No edema, erythema, cyanosis or tenderness appreciated.  Skin:  No rashes or lesions appreciated.   Neurological:  No gross motor or sensory deficits.   Psych: Appropriate affect.          Data   Labs reviewed:  Recent Labs   Lab Test 01/16/23  0000 05/23/21  0939 02/18/21  1139 02/18/21  0947 06/12/19  0914 12/07/18  1725    109* 110  --  98  --    HDL 46 45* 51  --  44*  --    NHDL  --  140*  --   --  139*  --    CHOL 206* 185 207*  --  183  --    TRIG 221* 155* 228*  --  207*  --    TSH  --  1.30  --  1.95  --  1.58       Lab Results   Component Value Date    WBC 5.6 10/03/2023    WBC 5.3 05/23/2021    RBC 4.17 10/03/2023    RBC 4.12 05/23/2021    HGB 13.6 10/03/2023    HGB 13.3 05/23/2021    HCT 38.9 10/03/2023    HCT 39.4 " "05/23/2021    MCV 93 10/03/2023    MCV 96 05/23/2021    MCH 32.6 10/03/2023    MCH 32.3 05/23/2021    MCHC 35.0 10/03/2023    MCHC 33.8 05/23/2021    RDW 12.3 10/03/2023    RDW 12.3 05/23/2021     10/03/2023     05/23/2021       Lab Results   Component Value Date     10/03/2023    .0 01/16/2023    POTASSIUM 4.5 10/03/2023    POTASSIUM 4.65 01/16/2023    CHLORIDE 104 10/03/2023    CHLORIDE 100.8 01/16/2023    CO2 28 10/03/2023    CO2 31.7 01/16/2023    ANIONGAP 8 10/03/2023    ANIONGAP 3 05/23/2021     (H) 10/03/2023     (A) 01/16/2023    BUN 13.3 10/03/2023    BUN 12 01/16/2023    BUN 13.8 01/16/2023    CR 0.78 10/03/2023    CR 0.87 01/16/2023    GFRESTIMATED 85 10/03/2023    GFRESTIMATED 86 05/23/2021    GFRESTBLACK >90 05/23/2021    VINH 9.8 10/03/2023    VINH 9.4 01/16/2023      Lab Results   Component Value Date    AST 15 11/09/2016    ALT 16 11/09/2016       Lab Results   Component Value Date    A1C 5.1 01/16/2023       No results found for: \"INR\"         Problem List     Patient Active Problem List   Diagnosis    Allergic rhinitis    Health Care Home    ACP (advance care planning)    Diffuse cystic mastopathy    Atrophic vaginitis    Hx of atrial flutter    Atrial flutter (H)--followed by cardiology    Esophageal reflux    Localized osteoarthritis of hand    Pitted nails    Mass of upper inner quadrant of right breast            Medications     Current Outpatient Medications   Medication Sig Dispense Refill    acetaminophen (TYLENOL) 500 MG tablet Take 500-1,000 mg by mouth every 6 hours as needed for mild pain      apixaban ANTICOAGULANT (ELIQUIS) 5 MG tablet Take 1 tablet (5 mg) by mouth 2 times daily 60 tablet 4    Ascorbic Acid (VITAMIN C) 500 MG CAPS Unknown dose at this time      biotin 1000 MCG TABS tablet Take 1,000 mcg by mouth daily      Calcium Carbonate-Vitamin D (CALCIUM + D) 600-200 MG-UNIT per tablet Take 2 tablets by mouth daily       ibuprofen " (ADVIL/MOTRIN) 200 MG tablet Take 400 mg by mouth every 4 hours as needed for mild pain      metoprolol succinate ER (TOPROL XL) 25 MG 24 hr tablet Take 1 tablet (25 mg) by mouth daily 90 tablet 0    Multiple Vitamins-Calcium (ONE-A-DAY WOMENS FORMULA) TABS Take 1 tablet by mouth daily.      UNABLE TO FIND MEDICATION NAME: [employer] Allegra      VITAMIN E NATURAL PO               Past Medical History     Past Medical History:   Diagnosis Date    Allergic rhinitis 05/27/2011     Problem list name updated by automated process. Provider to review    Arthritis     hands    Diffuse cystic mastopathy 12/27/2012    History of basal cell carcinoma     Hx of atrial flutter 12/14/2018    Symptomatic menopausal or female climacteric states 01/09/2008     Past Surgical History:   Procedure Laterality Date    EP ABLATION FOCAL AFIB N/A 10/3/2023    Procedure: Ablation Atrial Fibrilation;  Surgeon: Jessica Way MD;  Location:  HEART CARDIAC CATH LAB    HC CORRECT BUNION,SIMPLE Right 1978    HC TOOTH EXTRACTION W/FORCEP  age 20    LAPAROSCOPIC HYSTERECTOMY TOTAL, BILATERAL SALPINGO-OOPHORECTOMY, COMBINED Bilateral 3/6/2019    Procedure: Single port total laparoscopic hysterectomy bilateral salpingo-oopherectomy;  Surgeon: Laisha Kathleen MD;  Location: [facility] OR     Family History   Problem Relation Age of Onset    Cancer Mother         presacral cancer    Hypertension Mother     Hypertension Father     Prostate Cancer Father     Diabetes Maternal Grandmother     Breast Cancer Maternal Aunt     C.A.D. 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   Tobacco Use    Smoking status:  Former     Packs/day: 0.50     Years: 6.00     Additional pack years: 0.00     Total pack years: 3.00     Types: Cigarettes     Quit date: 3/26/1982     Years since quittin.8    Smokeless tobacco: Never   Substance and Sexual Activity    Alcohol use: Yes     Alcohol/week: 7.0 standard drinks of alcohol     Types: 7 Standard drinks or equivalent per week     Comment: 7 drinks per week    Drug use: No    Sexual activity: Yes     Partners: Male     Birth control/protection: Post-menopausal     Comment:    Other Topics Concern     Service No    Blood Transfusions No    Caffeine Concern Yes    Occupational Exposure No    Hobby Hazards No    Sleep Concern No    Stress Concern No    Weight Concern No    Special Diet No    Back Care Not Asked    Exercise Yes    Bike Helmet Not Asked    Seat Belt Yes    Self-Exams Yes   Social History Narrative    Not on file     Social Determinants of Health     Financial Resource Strain: Not on file   Food Insecurity: Not on file   Transportation Needs: Not on file   Physical Activity: Not on file   Stress: Not on file   Social Connections: Not on file   Interpersonal Safety: Not on file   Housing Stability: Not on file            Allergies   Sulfa antibiotics    Today's clinic visit entailed:  Review of the result(s) of each unique test - EKG, ablation procedure  Prescription drug management  I spent a total of 30 minutes on the day of the visit.   Time spent by me doing chart review, history and exam, documentation and further activities per the note  Provider  Link to [facility] Help Grid     The level of medical decision making during this visit was of moderate complexity.        Thank you for allowing me to participate in the care of your patient.      Sincerely,     June Christian PA-C     [facility]  cc:   June Christian PA-C  [address] [place] [id number]

## 2024-01-05 NOTE — PATIENT INSTRUCTIONS
Today's Plan:   Can stop Eliquis today.   Blood pressures 1-2x daily at home x 1 week. RN call for update after that.   Continue intermittent fotobabbledia mobile monitoring.  Return to see Dr. Way in six months time.     If you have questions or concerns please call my nurse team at [phone].   Scheduling phone number: [phone]  Reminder: Please bring in all current medications, over the counter supplements and vitamin bottles to your next appointment.    It was a pleasure seeing you today!     June Christian PA-C

## 2024-01-12 DIAGNOSIS — I48.0 PAROXYSMAL ATRIAL FIBRILLATION (H): ICD-10-CM

## 2024-01-12 RX ORDER — METOPROLOL SUCCINATE 25 MG/1
25 TABLET, EXTENDED RELEASE ORAL DAILY
Qty: 90 TABLET | Refills: 4 | Status: SHIPPED | OUTPATIENT
Start: 2024-01-12

## 2024-01-16 ENCOUNTER — TELEPHONE (OUTPATIENT)
Dept: CARDIOLOGY | Facility: CLINIC | Age: 64
End: 2024-01-16
Payer: COMMERCIAL

## 2024-01-16 NOTE — TELEPHONE ENCOUNTER
01/16/24 Msg recd from June Christian, [facility] Heart Ep Nurse  Hi! Please call Gaby in one week to see how her BP's are running - thanks!    Spoke w pt who reported the following BPS    01/06- 126/79  01/07 -131/78  01/08- 134/82  01//79  01/10- 140/79 am   134/80 pm  01/11- 139/81 am   125/74 pm  01//85  01/13 -131/78  01/14 -124/93  01/15- 123/76  01/16- 120/66    Pt states most of the BPS were taken before am Metoprolol. She does not watch her diet for sodium intake, discussed ways to decrease/avoid high sodium foods. Pt stated she has not been exercising much, but has started to get back into it this week.  Will send update to June and call pt back w recommendations  Pt voiced understanding and agreement with plan.   Nghia 1020 am   stated

## 2024-01-17 NOTE — TELEPHONE ENCOUNTER
Made pt aware that June reviewed BP's and that they looked good and no changes to her medications at this time. Pt states understanding. JNTeresa

## 2024-02-21 ENCOUNTER — OFFICE VISIT (OUTPATIENT)
Dept: FAMILY MEDICINE | Facility: CLINIC | Age: 64
End: 2024-02-21
Payer: COMMERCIAL

## 2024-02-21 DIAGNOSIS — L82.0 INFLAMED SEBORRHEIC KERATOSIS: ICD-10-CM

## 2024-02-21 DIAGNOSIS — Z85.828 HISTORY OF BASAL CELL CARCINOMA: ICD-10-CM

## 2024-02-21 DIAGNOSIS — L57.0 AK (ACTINIC KERATOSIS): ICD-10-CM

## 2024-02-21 DIAGNOSIS — D22.9 MULTIPLE BENIGN NEVI: Primary | ICD-10-CM

## 2024-02-21 DIAGNOSIS — L21.9 SEBORRHEIC DERMATITIS: ICD-10-CM

## 2024-02-21 DIAGNOSIS — L81.4 LENTIGINES: ICD-10-CM

## 2024-02-21 DIAGNOSIS — Z12.83 ENCOUNTER FOR SCREENING FOR MALIGNANT NEOPLASM OF SKIN: ICD-10-CM

## 2024-02-21 DIAGNOSIS — D18.01 CHERRY ANGIOMA: ICD-10-CM

## 2024-02-21 DIAGNOSIS — L82.1 SEBORRHEIC KERATOSES: ICD-10-CM

## 2024-02-21 PROCEDURE — 17110 DESTRUCTION B9 LES UP TO 14: CPT | Performed by: NURSE PRACTITIONER

## 2024-02-21 PROCEDURE — 17000 DESTRUCT PREMALG LESION: CPT | Mod: XS | Performed by: NURSE PRACTITIONER

## 2024-02-21 PROCEDURE — 99214 OFFICE O/P EST MOD 30 MIN: CPT | Mod: 25 | Performed by: NURSE PRACTITIONER

## 2024-02-21 RX ORDER — KETOCONAZOLE 20 MG/ML
SHAMPOO TOPICAL
Qty: 120 ML | Refills: 11 | Status: SHIPPED | OUTPATIENT
Start: 2024-02-21

## 2024-02-21 NOTE — PROGRESS NOTES
[facility] Dermatology Note  Encounter Date: Feb 21, 2024  Office Visit     Reviewed patients past medical history and pertinent chart review prior to patients visit today.     Dermatology Problem List:  History of BCC on the left nasal ala 10/22 at outside clinic  Seb derm, ketoconazole shampoo given 2/21/2024   AK nasal tip, cryo 2/21/2024  ISK, cryo  ____________________________________________    Assessment & Plan:     # History of BCC on left nasal ala at outside clinic. Well healed scar without signs of recurrence.     # Actinic keratosis, nasal tip. Premalignant nature discussed with patient. Treatment options discussed with patient today including no treatment, topical treatment, and cryotherapy. Patient elects to treat visible lesions today with cryotherapy. After verbal consent and discussion of risks and benefits including but no limited to dyspigmentation/scar, blister, and pain. A total of 1 actinic keratoses were treated with 1-2mm freeze border for 2 cycle with liquid nitrogen. Post cryotherapy instructions were provided.     # Irritated and inflamed Seborrheic Keratosis, chest and back x2. Discussed treatment options with patient including no treatment, cryotherapy, and shave removal. Patient prefers cryotherapy today due to irritation and itching. After verbal consent and discussion of risks and benefits including but no limited to dyspigmentation/scar, blister, and pain, 3 was(were) treated with 1-2mm freeze border for 2 cycles with liquid nitrogen. Post cryotherapy instructions were provided.       # Seborrheic dermatitis, scalp. Discussed that this is a recurring rash for most people and will need some maintenance even after rash has resolved. Given prescription for ketoconazole 2% shampoo to use every 1-2 days while rash is present, and ongoing 1-3 times weekly when rash is well controlled. Advised to leave on for 2-5 minutes before rinsing.    # Benign skin findings  including: sebaceous hyperplasia, seborrheic keratoses, cherry angioma, lentigines and benign nevi.   - No further intervention required. Patient to report changes.   - Patient reassured of the benign nature of these lesions.    #Signs and Symptoms of non-melanoma skin cancer and ABCDEs of melanoma reviewed with patient. Patient encouraged to perform monthly self skin exams and educated on how to perform them. UV precautions reviewed with patient. Patient was asked about new or changing moles/lesions on body.     #Reviewed Sunscreen: Apply 20 minutes prior to going outdoors and reapply every two hours, when wet or sweating. We recommend using an SPF 30 or higher, and to use one that is water resistant.       Follow-up:  1 years for follow up full body skin exam, prn for new or changing lesions or new concerns    Donna Thorne, DOMI  Dermatology   ____________________________________________    CC: Skin Check ([facility] [place])    HPI:  Ms. Gaby Aguilar is a(n) 63 year old female who presents today as a return patient for a full body skin cancer screening.  She has a few itchy spots on the chest and back that she would like looked at.  They are bothersome to her.  She also has a rough textured spot that she has had for several months on the tip of the nose.  She has a bump on the right side of the nose that she is concerned about looking similar to her basal cell carcinoma that she had on the left nose previously.  It is not symptomatic and has been present for a few years unchanged.  She also has an itchy spot on her scalp and says that her scalp and ears are frequently itchy and scaly.    Patient is otherwise feeling well, without additional skin concerns.     Physical Exam:  Vitals: LMP 05/26/2010   SKIN: Total skin including the genitalia areas was performed. The exam included the head/face, neck, both arms, chest, back, abdomen, both legs, digits, mons pubis, genitals, buttock and nails.   -well healed scar on left nasal  ala  -There are yellow oily papules with central dells on the face including on the left nasal sidewall as noted in HPI  -Pink scaly linear papule on the nasal tip  -several 1-2mm red dome shaped symmetric papules scattered on the trunk  -multiple tan/brown flat round macules and raised papules scattered throughout trunk, extremities and head. No worrisome features for malignancy noted on examination.  -scattered tan, homogenous macules scattered on sun exposed areas of trunk, extremities and face.   -scattered waxy, stuck on tan/brown papules and patches on the trunk chest and back as noted in HPI    - No other lesions of concern on areas examined.     Medications:  Current Outpatient Medications   Medication    acetaminophen (TYLENOL) 500 MG tablet    Ascorbic Acid (VITAMIN C) 500 MG CAPS    biotin 1000 MCG TABS tablet    Calcium Carbonate-Vitamin D (CALCIUM + D) 600-200 MG-UNIT per tablet    ibuprofen (ADVIL/MOTRIN) 200 MG tablet    metoprolol succinate ER (TOPROL XL) 25 MG 24 hr tablet    Multiple Vitamins-Calcium (ONE-A-DAY WOMENS FORMULA) TABS    UNABLE TO FIND    VITAMIN E NATURAL PO     No current facility-administered medications for this visit.      Past Medical History:   Patient Active Problem List   Diagnosis    Allergic rhinitis    Health Care Home    ACP (advance care planning)    Diffuse cystic mastopathy    Atrophic vaginitis    Hx of atrial flutter    Atrial flutter (H)--followed by cardiology    Esophageal reflux    Localized osteoarthritis of hand    Pitted nails    Mass of upper inner quadrant of right breast     Past Medical History:   Diagnosis Date    Allergic rhinitis 05/27/2011     Problem list name updated by automated process. Provider to review    Arthritis     hands    Diffuse cystic mastopathy 12/27/2012    History of basal cell carcinoma     Hx of atrial flutter 12/14/2018    Symptomatic menopausal or female climacteric states 01/09/2008       CC No referring provider defined for this  encounter. on close of this encounter.

## 2024-02-21 NOTE — LETTER
2/21/2024         RE: Gaby Aguilar  [address] [place]   [place] [address]        Dear Colleague,    Thank you for referring your patient, [facility]ilar[facility]. Please see a copy of my visit note below.    [facility] Dermatology Note  Encounter Date: Feb 21, 2024  Office Visit     Reviewed patients past medical history and pertinent chart review prior to patients visit today.     Dermatology Problem List:  History of BCC on the left nasal ala 10/22 at outside clinic  Seb derm, ketoconazole shampoo given 2/21/2024   AK nasal tip, cryo 2/21/2024  ISK, cryo  ____________________________________________    Assessment & Plan:     # History of BCC on left nasal ala at outside clinic. Well healed scar without signs of recurrence.     # Actinic keratosis, nasal tip. Premalignant nature discussed with patient. Treatment options discussed with patient today including no treatment, topical treatment, and cryotherapy. Patient elects to treat visible lesions today with cryotherapy. After verbal consent and discussion of risks and benefits including but no limited to dyspigmentation/scar, blister, and pain. A total of 1 actinic keratoses were treated with 1-2mm freeze border for 2 cycle with liquid nitrogen. Post cryotherapy instructions were provided.     # Irritated and inflamed Seborrheic Keratosis, chest and back x2. Discussed treatment options with patient including no treatment, cryotherapy, and shave removal. Patient prefers cryotherapy today due to irritation and itching. After verbal consent and discussion of risks and benefits including but no limited to dyspigmentation/scar, blister, and pain, 3 was(were) treated with 1-2mm freeze border for 2 cycles with liquid nitrogen. Post cryotherapy instructions were provided.       # Seborrheic dermatitis, scalp. Discussed that this is a recurring rash for most people and will need some maintenance even  after rash has resolved. Given prescription for ketoconazole 2% shampoo to use every 1-2 days while rash is present, and ongoing 1-3 times weekly when rash is well controlled. Advised to leave on for 2-5 minutes before rinsing.    # Benign skin findings including: sebaceous hyperplasia, seborrheic keratoses, cherry angioma, lentigines and benign nevi.   - No further intervention required. Patient to report changes.   - Patient reassured of the benign nature of these lesions.    #Signs and Symptoms of non-melanoma skin cancer and ABCDEs of melanoma reviewed with patient. Patient encouraged to perform monthly self skin exams and educated on how to perform them. UV precautions reviewed with patient. Patient was asked about new or changing moles/lesions on body.     #Reviewed Sunscreen: Apply 20 minutes prior to going outdoors and reapply every two hours, when wet or sweating. We recommend using an SPF 30 or higher, and to use one that is water resistant.       Follow-up:  1 years for follow up full body skin exam, prn for new or changing lesions or new concerns    Donna Thorne CNP  Dermatology   ____________________________________________    CC: Skin Check ([facility] [place])    HPI:  Ms. Gaby Aguilar is a(n) 63 year old female who presents today as a return patient for a full body skin cancer screening.  She has a few itchy spots on the chest and back that she would like looked at.  They are bothersome to her.  She also has a rough textured spot that she has had for several months on the tip of the nose.  She has a bump on the right side of the nose that she is concerned about looking similar to her basal cell carcinoma that she had on the left nose previously.  It is not symptomatic and has been present for a few years unchanged.  She also has an itchy spot on her scalp and says that her scalp and ears are frequently itchy and scaly.    Patient is otherwise feeling well, without additional skin concerns.     Physical  Exam:  Vitals: LMP 05/26/2010   SKIN: Total skin including the genitalia areas was performed. The exam included the head/face, neck, both arms, chest, back, abdomen, both legs, digits, mons pubis, genitals, buttock and nails.   -well healed scar on left nasal ala  -There are yellow oily papules with central dells on the face including on the left nasal sidewall as noted in HPI  -Pink scaly linear papule on the nasal tip  -several 1-2mm red dome shaped symmetric papules scattered on the trunk  -multiple tan/brown flat round macules and raised papules scattered throughout trunk, extremities and head. No worrisome features for malignancy noted on examination.  -scattered tan, homogenous macules scattered on sun exposed areas of trunk, extremities and face.   -scattered waxy, stuck on tan/brown papules and patches on the trunk chest and back as noted in HPI    - No other lesions of concern on areas examined.     Medications:  Current Outpatient Medications   Medication     acetaminophen (TYLENOL) 500 MG tablet     Ascorbic Acid (VITAMIN C) 500 MG CAPS     biotin 1000 MCG TABS tablet     Calcium Carbonate-Vitamin D (CALCIUM + D) 600-200 MG-UNIT per tablet     ibuprofen (ADVIL/MOTRIN) 200 MG tablet     metoprolol succinate ER (TOPROL XL) 25 MG 24 hr tablet     Multiple Vitamins-Calcium (ONE-A-DAY WOMENS FORMULA) TABS     UNABLE TO FIND     VITAMIN E NATURAL PO     No current facility-administered medications for this visit.      Past Medical History:   Patient Active Problem List   Diagnosis     Allergic rhinitis     Health Care Home     ACP (advance care planning)     Diffuse cystic mastopathy     Atrophic vaginitis     Hx of atrial flutter     Atrial flutter (H)--followed by cardiology     Esophageal reflux     Localized osteoarthritis of hand     Pitted nails     Mass of upper inner quadrant of right breast     Past Medical History:   Diagnosis Date     Allergic rhinitis 05/27/2011     Problem list name updated by  automated process. Provider to review     Arthritis     hands     Diffuse cystic mastopathy 12/27/2012     History of basal cell carcinoma      Hx of atrial flutter 12/14/2018     Symptomatic menopausal or female climacteric states 01/09/2008       CC No referring provider defined for this encounter. on close of this encounter.      Again, thank you for allowing me to participate in the care of your patient.        Sincerely,        FERNANDEZ Gonzalez CNP

## 2024-03-03 NOTE — NURSING NOTE
Chief Complaint   Patient presents with     Follow Up     Zio patch results F/U     Vitals were taken and medications were reconciled.     June Parry CMA    1:13 PM        
Med Reconcile: Reviewed and verified all current medications with the patient. The updated medication list was printed and given to the patient.  New Medication: Patient was educated regarding newly prescribed medication, including discussion of  the indication, administration, side effects, and when to report to MD or RN. Patient demonstrated understanding of this information and agreed to call with further questions or concerns.  Return Appointment: Patient given instructions regarding scheduling next clinic visit. Patient demonstrated understanding of this information and agreed to call with further questions or concerns.  Medication Change: Patient was educated regarding prescribed medication change, including discussion of the indication, administration, side effects, and when to report to MD or RN. Patient demonstrated understanding of this information and agreed to call with further questions or concerns.  Patient stated she understood all health information given and agreed to call with further questions or concerns.    Ivory Young LPN    
20

## 2024-03-24 ENCOUNTER — HEALTH MAINTENANCE LETTER (OUTPATIENT)
Age: 64
End: 2024-03-24

## 2024-04-28 DIAGNOSIS — L30.9 VULVAR DERMATITIS: ICD-10-CM

## 2024-04-30 RX ORDER — TRIAMCINOLONE ACETONIDE 1 MG/G
CREAM TOPICAL 2 TIMES DAILY
Qty: 30 G | Refills: 0 | Status: SHIPPED | OUTPATIENT
Start: 2024-04-30 | End: 2024-05-14

## 2024-04-30 NOTE — TELEPHONE ENCOUNTER
Last filled on 10/18/23:    Jeancarlos Griffin,     If the cream has worked to get rid of the itching that is a good sign. You can use it as needed if the itching returns, but hopefully you can go weeks-months at a time without having to need it.     Claudia Jamil PA-C  10/27/2023

## 2024-05-20 NOTE — PROGRESS NOTES
Preventive Care Visit  [facility]  Constanza Johnson MD, Family Medicine  May 24, 2024       SUBJECTIVE:   Gaby is a 63 year old, presenting for the following:  Physical (Fasting today, no concerns )      HPI      Here for a physical. She is fasting.  Labial cyst. She has already showed this to dermatology, they didn't want to do anything with this. Not bothering her.          Have you ever done Advance Care Planning? (For example, a Health Directive, POLST, or a discussion with a medical provider or your loved ones about your wishes): Yes, advance care planning is on file.    Social History     Tobacco Use    Smoking status: Former     Current packs/day: 0.00     Average packs/day: 0.5 packs/day for 6.0 years (3.0 ttl pk-yrs)     Types: Cigarettes     Start date: 3/26/1976     Quit date: 3/26/1982     Years since quittin.1     Passive exposure: Current    Smokeless tobacco: Never   Substance Use Topics    Alcohol use: Yes     Alcohol/week: 14.0 standard drinks of alcohol     Types: 14 Standard drinks or equivalent per week              No data to display            No concerns  Reviewed orders with patient.  Reviewed health maintenance and updated orders accordingly - Yes  BP Readings from Last 3 Encounters:   24 138/86   24 (!) 150/74   10/18/23 128/80    Wt Readings from Last 3 Encounters:   24 68.9 kg (152 lb)   24 68.9 kg (152 lb)   10/18/23 68.5 kg (151 lb)                  Patient Active Problem List   Diagnosis    Allergic rhinitis    Health Care Home    ACP (advance care planning)    Diffuse cystic mastopathy    Atrophic vaginitis    Hx of atrial flutter    Atrial flutter (H)--followed by cardiology    Esophageal reflux    Localized osteoarthritis of hand    Pitted nails    Mass of upper inner quadrant of right breast     Past Surgical History:   Procedure Laterality Date    EP ABLATION FOCAL AFIB N/A 10/3/2023    Procedure: Ablation Atrial Fibrilation;   Surgeon: Jessica Way MD;  Location:  [facility]    HC CORRECT BUNION,SIMPLE Right     HC TOOTH EXTRACTION W/FORCEP  age 20    LAPAROSCOPIC HYSTERECTOMY TOTAL, BILATERAL SALPINGO-OOPHORECTOMY, COMBINED Bilateral 3/6/2019    Procedure: Single port total laparoscopic hysterectomy bilateral salpingo-oopherectomy;  Surgeon: Laisha Kathleen MD;  Location: [facility]       Social History     Tobacco Use    Smoking status: Former     Current packs/day: 0.00     Average packs/day: 0.5 packs/day for 6.0 years (3.0 ttl pk-yrs)     Types: Cigarettes     Start date: 3/26/1976     Quit date: 3/26/1982     Years since quittin.1     Passive exposure: Current    Smokeless tobacco: Never   Substance Use Topics    Alcohol use: Yes     Alcohol/week: 14.0 standard drinks of alcohol     Types: 14 Standard drinks or equivalent per week     Family History   Problem Relation Age of Onset    Cancer Mother         presacral cancer    Hypertension Mother     Hypertension Father     Prostate Cancer Father     Diabetes Maternal Grandmother     Breast Cancer Maternal Aunt     C.A.D. No family hx of     Cerebrovascular Disease No family hx of     Cancer - colorectal No family hx of          Current Outpatient Medications   Medication Sig Dispense Refill    acetaminophen (TYLENOL) 500 MG tablet Take 500-1,000 mg by mouth every 6 hours as needed for mild pain      Ascorbic Acid (VITAMIN C) 500 MG CAPS Unknown dose at this time      biotin 1000 MCG TABS tablet Take 1,000 mcg by mouth daily      Calcium Carbonate-Vitamin D (CALCIUM + D) 600-200 MG-UNIT per tablet Take 2 tablets by mouth daily       ibuprofen (ADVIL/MOTRIN) 200 MG tablet Take 400 mg by mouth every 4 hours as needed for mild pain      ketoconazole (NIZORAL) 2 % external shampoo Use every 1-2 days when flared. Leave in few minutes before rinsing. Use twice weekly to prevent flares. 120 mL 11    metoprolol succinate ER (TOPROL XL) 25 MG 24 hr tablet Take  1 tablet (25 mg) by mouth daily 90 tablet 4    Multiple Vitamins-Calcium (ONE-A-DAY WOMENS FORMULA) TABS Take 1 tablet by mouth daily.      UNABLE TO FIND MEDICATION NAME: Margot Lunagra      VITAMIN E NATURAL PO          Breast Cancer Screening:    FHS-7:       8/17/2022     7:26 AM 8/24/2023    10:37 AM   Breast CA Risk Assessment (FHS-7)   Did any of your first-degree relatives have breast or ovarian cancer? No No   Did any of your relatives have bilateral breast cancer? No No   Did any man in your family have breast cancer? No No   Did any woman in your family have breast and ovarian cancer? No No   Did any woman in your family have breast cancer before age 50 y? No No   Do you have 2 or more relatives with breast and/or ovarian cancer? Yes Yes   Do you have 2 or more relatives with breast and/or bowel cancer? Yes Yes     Mammogram referral done.  Was previously referred to genetic counselor    Pertinent mammograms are reviewed under the imaging tab.      History of abnormal Pap smear: hysterectomy            Reviewed and updated as needed this visit by clinical staff   Tobacco  Allergies               Reviewed and updated as needed this visit by Provider                  Past Medical History:   Diagnosis Date    Allergic rhinitis 05/27/2011     Problem list name updated by automated process. Provider to review    Arthritis     hands    Diffuse cystic mastopathy 12/27/2012    History of basal cell carcinoma     Hx of atrial flutter 12/14/2018    Symptomatic menopausal or female climacteric states 01/09/2008      Past Surgical History:   Procedure Laterality Date    EP ABLATION FOCAL AFIB N/A 10/3/2023    Procedure: Ablation Atrial Fibrilation;  Surgeon: Jessica Way MD;  Location:  [facility]    HC CORRECT BUNION,SIMPLE Right 1978    HC TOOTH EXTRACTION W/FORCEP  age 20    LAPAROSCOPIC HYSTERECTOMY TOTAL, BILATERAL SALPINGO-OOPHORECTOMY, COMBINED Bilateral 3/6/2019    Procedure:  "Single port total laparoscopic hysterectomy bilateral salpingo-oopherectomy;  Surgeon: Laisha Kathleen MD;  Location: [facility] OR     Review of Systems    Review of Systems  Constitutional, HEENT, cardiovascular, pulmonary, gi and gu systems are negative, except as otherwise noted.    OBJECTIVE:   /86 (BP Location: Right arm, Patient Position: Sitting, Cuff Size: Adult Large)   Pulse 62   Temp 98  F (36.7  C) (Temporal)   Ht 1.632 m (5' 4.25\")   Wt 68.9 kg (152 lb)   LMP 05/26/2010   SpO2 97%   BMI 25.89 kg/m     Estimated body mass index is 25.89 kg/m  as calculated from the following:    Height as of this encounter: 1.632 m (5' 4.25\").    Weight as of this encounter: 68.9 kg (152 lb).  Physical Exam  GENERAL: alert and no distress  EYES: Eyes grossly normal to inspection, PERRL and conjunctivae and sclerae normal  HENT: ear canals and TM's normal, nose and mouth without ulcers or lesions  NECK: no adenopathy, no asymmetry, masses, or scars  RESP: lungs clear to auscultation - no rales, rhonchi or wheezes  BREAST: normal without masses, tenderness or nipple discharge and no palpable axillary masses or adenopathy  CV: regular rate and rhythm, normal S1 S2, no S3 or S4, no murmur, click or rub, no peripheral edema  ABDOMEN: soft, nontender, no hepatosplenomegaly, no masses and bowel sounds normal  MS: no gross musculoskeletal defects noted, no edema  SKIN: no suspicious lesions or rashes  NEURO: Normal strength and tone, mentation intact and speech normal  PSYCH: mentation appears normal, affect normal/bright  Labia: apparent cyst    Diagnostic Test Results:  Labs reviewed in Epic  No results found for any visits on 05/24/24.    ASSESSMENT/PLAN:   1. Encounter for routine history and physical exam in female patient    - VENOUS COLLECTION  - Lipid Panel (BFP)  - Basic Metabolic Panel (BFP)    2. Encounter for screening mammogram for breast cancer    - MA Screening Bilateral w/ [place]; Future  - Radiology Referral " (Affiliate Use Only)    3. Atrial flutter, unspecified type (H)  Followed by cardiology    4. Encounter for screening involving social determinants of health (SDoH)  Social Determinants of Health     Food Insecurity: Low Risk  (5/24/2024)    Food Insecurity     Within the past 12 months, did you worry that your food would run out before you got money to buy more?: No     Within the past 12 months, did the food you bought just not last and you didn t have money to get more?: No   Depression: Not at risk (1/5/2024)    PHQ-2     PHQ-2 Score: 0   Housing Stability: Low Risk  (5/24/2024)    Housing Stability     Do you have housing? : Yes     Are you worried about losing your housing?: No   Tobacco Use: Medium Risk (5/24/2024)    Patient History     Smoking Tobacco Use: Former     Smokeless Tobacco Use: Never     Passive Exposure: Current   Financial Resource Strain: Low Risk  (5/24/2024)    Financial Resource Strain     Within the past 12 months, have you or your family members you live with been unable to get utilities (heat, electricity) when it was really needed?: No   Alcohol Use: Not At Risk (5/24/2024)    AUDIT-C     Frequency of Alcohol Consumption: 4 or more times a week     Average Number of Drinks: 1 or 2     Frequency of Binge Drinking: Never   Transportation Needs: Low Risk  (5/24/2024)    Transportation Needs     Within the past 12 months, has lack of transportation kept you from medical appointments, getting your medicines, non-medical meetings or appointments, work, or from getting things that you need?: No   Physical Activity: Sufficiently Active (5/24/2024)    Exercise Vital Sign     Days of Exercise per Week: 5 days     Minutes of Exercise per Session: 60 min   Interpersonal Safety: Low Risk  (5/24/2024)    Interpersonal Safety     Do you feel physically and emotionally safe where you currently live?: Yes     Within the past 12 months, have you been hit, slapped, kicked or otherwise physically hurt by  "someone?: No     Within the past 12 months, have you been humiliated or emotionally abused in other ways by your partner or ex-partner?: No   Stress: No Stress Concern Present (5/24/2024)    [employer] - Occupational Stress Questionnaire     Feeling of Stress : Only a little   Social Connections: Socially Isolated (5/24/2024)    Social Connection and Isolation Panel [NHANES]     Frequency of Communication with Friends and Family: More than three times a week     Frequency of Social Gatherings with Friends and Family: Once a week     Attends Hoahaoism Services: Never     Active Member of Clubs or Organizations: No     Attends Club or Organization Meetings: Never     Marital Status:    Health Literacy: Adequate Health Literacy (5/24/2024)     Health Literacy     Frequency of need for help with medical instructions: Never        5. Female genital lesion  Labial cyst. I advised that she followup with gynecology on this. She said she has already showed this to dermatology and they didn't want to do anything with this.  She doesn't want to see gynecology    Patient has been advised of split billing requirements and indicates understanding: Yes      Counseling  Reviewed preventive health counseling, as reflected in patient instructions       Regular exercise       Healthy diet/nutrition      BMI  Estimated body mass index is 25.89 kg/m  as calculated from the following:    Height as of this encounter: 1.632 m (5' 4.25\").    Weight as of this encounter: 68.9 kg (152 lb).         She reports that she quit smoking about 42 years ago. Her smoking use included cigarettes. She started smoking about 48 years ago. She has a 3 pack-year smoking history. She has been exposed to tobacco smoke. She has never used smokeless tobacco.          Signed Electronically by: Constanza Johnson MD  "

## 2024-05-24 ENCOUNTER — OFFICE VISIT (OUTPATIENT)
Dept: FAMILY MEDICINE | Facility: CLINIC | Age: 64
End: 2024-05-24

## 2024-05-24 VITALS
OXYGEN SATURATION: 97 % | SYSTOLIC BLOOD PRESSURE: 138 MMHG | WEIGHT: 152 LBS | TEMPERATURE: 98 F | HEIGHT: 64 IN | HEART RATE: 62 BPM | BODY MASS INDEX: 25.95 KG/M2 | DIASTOLIC BLOOD PRESSURE: 86 MMHG

## 2024-05-24 DIAGNOSIS — Z13.9 ENCOUNTER FOR SCREENING INVOLVING SOCIAL DETERMINANTS OF HEALTH (SDOH): ICD-10-CM

## 2024-05-24 DIAGNOSIS — Z00.00 ENCOUNTER FOR ROUTINE HISTORY AND PHYSICAL EXAM IN FEMALE PATIENT: Primary | ICD-10-CM

## 2024-05-24 DIAGNOSIS — N94.9 FEMALE GENITAL LESION: ICD-10-CM

## 2024-05-24 DIAGNOSIS — I48.92 ATRIAL FLUTTER, UNSPECIFIED TYPE (H): ICD-10-CM

## 2024-05-24 DIAGNOSIS — Z12.31 ENCOUNTER FOR SCREENING MAMMOGRAM FOR BREAST CANCER: ICD-10-CM

## 2024-05-24 LAB
BUN SERPL-MCNC: 12 MG/DL (ref 7–25)
BUN/CREATININE RATIO: 17 (ref 6–32)
CALCIUM SERPL-MCNC: 10 MG/DL (ref 8.6–10.3)
CHLORIDE SERPLBLD-SCNC: 103.8 MMOL/L (ref 98–110)
CHOLEST SERPL-MCNC: 204 MG/DL (ref 0–199)
CHOLEST/HDLC SERPL: 4 {RATIO} (ref 0–5)
CO2 SERPL-SCNC: 25.8 MMOL/L (ref 20–32)
CREAT SERPL-MCNC: 0.69 MG/DL (ref 0.6–1.3)
GLUCOSE SERPL-MCNC: 96 MG/DL (ref 60–99)
HDLC SERPL-MCNC: 49 MG/DL (ref 40–150)
LDLC SERPL CALC-MCNC: 120 MG/DL (ref 0–130)
POTASSIUM SERPL-SCNC: 4.37 MMOL/L (ref 3.5–5.3)
SODIUM SERPL-SCNC: 138.7 MMOL/L (ref 135–146)
TRIGL SERPL-MCNC: 175 MG/DL (ref 0–149)

## 2024-05-24 PROCEDURE — 36415 COLL VENOUS BLD VENIPUNCTURE: CPT | Performed by: FAMILY MEDICINE

## 2024-05-24 PROCEDURE — 99396 PREV VISIT EST AGE 40-64: CPT | Performed by: FAMILY MEDICINE

## 2024-05-24 PROCEDURE — 80048 BASIC METABOLIC PNL TOTAL CA: CPT | Performed by: FAMILY MEDICINE

## 2024-05-24 PROCEDURE — 80061 LIPID PANEL: CPT | Performed by: FAMILY MEDICINE

## 2024-05-24 SDOH — HEALTH STABILITY: PHYSICAL HEALTH: ON AVERAGE, HOW MANY MINUTES DO YOU ENGAGE IN EXERCISE AT THIS LEVEL?: 60 MIN

## 2024-05-24 SDOH — HEALTH STABILITY: PHYSICAL HEALTH: ON AVERAGE, HOW MANY DAYS PER WEEK DO YOU ENGAGE IN MODERATE TO STRENUOUS EXERCISE (LIKE A BRISK WALK)?: 5 DAYS

## 2024-05-24 ASSESSMENT — LIFESTYLE VARIABLES
HOW MANY STANDARD DRINKS CONTAINING ALCOHOL DO YOU HAVE ON A TYPICAL DAY: 1 OR 2
HOW OFTEN DO YOU HAVE SIX OR MORE DRINKS ON ONE OCCASION: NEVER
SKIP TO QUESTIONS 9-10: 1
AUDIT-C TOTAL SCORE: 4
HOW OFTEN DO YOU HAVE A DRINK CONTAINING ALCOHOL: 4 OR MORE TIMES A WEEK

## 2024-05-24 ASSESSMENT — SOCIAL DETERMINANTS OF HEALTH (SDOH)
HOW OFTEN DO YOU GET TOGETHER WITH FRIENDS OR RELATIVES?: ONCE A WEEK
DO YOU BELONG TO ANY CLUBS OR ORGANIZATIONS SUCH AS CHURCH GROUPS UNIONS, FRATERNAL OR ATHLETIC GROUPS, OR SCHOOL GROUPS?: NO
HOW OFTEN DO YOU ATTENT MEETINGS OF THE CLUB OR ORGANIZATION YOU BELONG TO?: NEVER
HOW OFTEN DO YOU ATTEND CHURCH OR RELIGIOUS SERVICES?: NEVER
IN A TYPICAL WEEK, HOW MANY TIMES DO YOU TALK ON THE PHONE WITH FAMILY, FRIENDS, OR NEIGHBORS?: MORE THAN THREE TIMES A WEEK

## 2024-05-24 NOTE — NURSING NOTE
Chief Complaint   Patient presents with    Physical     Fasting today, no concerns      Pre-visit Screening:  Immunizations:  not up to date - rsv at pharmacy  Colonoscopy:  is up to date- will get records  Mammogram: is due and ordered today  Asthma Action Test/Plan:  NA  PHQ9:  NA  GAD7:  NA  Questioned patient about current smoking habits Pt. quit smoking some time ago.  Ok to leave detailed message on voice mail for today's visit only Yes, phone # [phone]

## 2024-05-29 NOTE — PROGRESS NOTES
ASSESSMENT & PLAN    Gaby was seen today for pain.    Diagnoses and all orders for this visit:    Patellar tendinitis of right knee  -     XR Knee Standing AP [place] Bilat Lat Right; Future  -     Physical Therapy  Referral; Future      This issue is acute and Unchanged.  Gaby presents to our clinic today to discuss her acute right knee pain.  Radiographs taken in clinic today were reviewed with the patient and show no evidence of osteoarthritis or other bony abnormalities.  On exam, the patient has pain over the patellar tendon that is reproduced with palpation, and her pain occurs when going from a fully flexed knee to standing, such as kneeling and lifting with the right leg.  These findings are consistent with patellar tendinitis as the main  of her symptoms.  Reassuringly, she has full range of motion of the knee, 5/5 strength in knee extension and straight leg raise, and negative ligamentous and meniscal testing.  We discussed the treatment options including continued activity modification, anti-inflammatory medicines, and physical therapy.  I do think patient would benefit from dedicated physical therapy at this point as she has been modifying activities for several months without relief of her symptoms, and the patient was amenable to this.  We determined the following plan:  - Physical therapy referral placed today  - She can continue to use over-the-counter pain medicines, ice, and heat as needed  - She can follow-up in 6 to 8 weeks if not improving      DO SHEREEN Gallardo [facility] [place]    -----  Chief Complaint   Patient presents with    Right Knee - Pain       SUBJECTIVE  Gaby Aguilar is a/an 63 year old female who is seen as a self referral for evaluation of right knee pain.     The patient is seen by themselves.    Onset: 2 month(s) ago. Reports insidious onset without acute precipitating event.  Location of Pain: right anterior knee  Worsened by:  "kneeling, bending / rotating knee  Better with: rest  Treatments tried: ice, knee brace  Associated symptoms: no distal numbness or tingling; denies swelling or warmth    Orthopedic/Surgical history: NO  Social History/Occupation: retired, avid walker      REVIEW OF SYSTEMS:  Review of systems negative unless mentioned in HPI     OBJECTIVE:  BP (!) 143/81   Ht 1.626 m (5' 4\")   Wt 68.9 kg (152 lb)   LMP 05/26/2010   BMI 26.09 kg/m     General: healthy, alert and in no distress  Skin: no suspicious lesions or rash.  CV: distal perfusion intact   Resp: normal respiratory effort without conversational dyspnea   Psych: normal mood and affect  Gait: NORMAL  Neuro: Normal light sensory exam of RL extremity     Right Knee exam  Gait: Normal  Alignment:  [x] Normal  [] Anatomic valgus  [] Anatomic varus  Inspection: [x] Normal  [] Ecchymosis present []Other   Palpation:  Joint Tenderness: [] No Tenderness  [] MJL [] LJL [] MCL Margin [] LCL Margin [] Pes Anserine [] Distal Quadricep  [x] Other-patellar tendon   Peripatellar Tenderness: [x] None [] Lateral pole [] Medial pole [] Superior pole [] Inferior pole    Patellar tendon pain: [] None [x] Present    Patellar apprehension: [x] None [] Present    Patellar motion: [x] Normal [] Abnormal  Crepitus: [x] None [] Present  Effusion: [x] None [] Trace [] 1+ [] 2+ [] 3+  Range of motion:  Flexion: 135, Extension: 0  Strength:  [x] Full in all planes, including intact extensor mechanism [] Limited as described  Neurologic: Normal sensation   Vascular: Normal pulses   Special tests:   Lachman: Negative  Anterior Drawer: Negative  Sag/quad Activation: NP  Posterior Drawer: Negative  Valgus Stress: Negative at 0/30  Varus Stress: Negative at 0/30  Osmani's: Negative  Thessaly: NP     Other notable findings/comments: None       RADIOLOGY:  Final results and radiologist's interpretation, available in the [facility] health record.  Images were reviewed with the patient in the office " today.  My personal interpretation of the performed imaging: Normal joint spacing and alignment of bilateral knees.  No acute bony abnormalities.

## 2024-06-03 ENCOUNTER — OFFICE VISIT (OUTPATIENT)
Dept: ORTHOPEDICS | Facility: CLINIC | Age: 64
End: 2024-06-03
Payer: COMMERCIAL

## 2024-06-03 ENCOUNTER — ANCILLARY PROCEDURE (OUTPATIENT)
Dept: GENERAL RADIOLOGY | Facility: CLINIC | Age: 64
End: 2024-06-03
Attending: STUDENT IN AN ORGANIZED HEALTH CARE EDUCATION/TRAINING PROGRAM
Payer: COMMERCIAL

## 2024-06-03 VITALS
HEIGHT: 64 IN | DIASTOLIC BLOOD PRESSURE: 81 MMHG | BODY MASS INDEX: 25.95 KG/M2 | SYSTOLIC BLOOD PRESSURE: 143 MMHG | WEIGHT: 152 LBS

## 2024-06-03 DIAGNOSIS — M76.51 PATELLAR TENDINITIS OF RIGHT KNEE: Primary | ICD-10-CM

## 2024-06-03 DIAGNOSIS — M25.561 RIGHT KNEE PAIN: ICD-10-CM

## 2024-06-03 PROCEDURE — 73560 X-RAY EXAM OF KNEE 1 OR 2: CPT | Mod: TC | Performed by: RADIOLOGY

## 2024-06-03 PROCEDURE — 99203 OFFICE O/P NEW LOW 30 MIN: CPT | Performed by: STUDENT IN AN ORGANIZED HEALTH CARE EDUCATION/TRAINING PROGRAM

## 2024-06-03 PROCEDURE — 73562 X-RAY EXAM OF KNEE 3: CPT | Mod: TC | Performed by: RADIOLOGY

## 2024-06-03 NOTE — LETTER
6/3/2024         RE: Gaby Aguilar  [address] [facility] [address]        Dear Colleague,    Thank you for referring your patient, Gaby Aguilar, to the [facility]. Please see a copy of my visit note below.    ASSESSMENT & PLAN    Gaby was seen today for pain.    Diagnoses and all orders for this visit:    Patellar tendinitis of right knee  -     XR Knee Standing AP [place] Bilat Lat Right; Future  -     Physical Therapy  Referral; Future      This issue is acute and Unchanged.  Gaby presents to our clinic today to discuss her acute right knee pain.  Radiographs taken in clinic today were reviewed with the patient and show no evidence of osteoarthritis or other bony abnormalities.  On exam, the patient has pain over the patellar tendon that is reproduced with palpation, and her pain occurs when going from a fully flexed knee to standing, such as kneeling and lifting with the right leg.  These findings are consistent with patellar tendinitis as the main  of her symptoms.  Reassuringly, she has full range of motion of the knee, 5/5 strength in knee extension and straight leg raise, and negative ligamentous and meniscal testing.  We discussed the treatment options including continued activity modification, anti-inflammatory medicines, and physical therapy.  I do think patient would benefit from dedicated physical therapy at this point as she has been modifying activities for several months without relief of her symptoms, and the patient was amenable to this.  We determined the following plan:  - Physical therapy referral placed today  - She can continue to use over-the-counter pain medicines, ice, and heat as needed  - She can follow-up in 6 to 8 weeks if not improving      Lan Mckenzie DO  [facility]    -----  Chief Complaint   Patient presents with     Right Knee - Pain       SUBJECTIVE  Gaby SHEREEN  "Lauren is a/an 63 year old female who is seen as a self referral for evaluation of right knee pain.     The patient is seen by themselves.    Onset: 2 month(s) ago. Reports insidious onset without acute precipitating event.  Location of Pain: right anterior knee  Worsened by: kneeling, bending / rotating knee  Better with: rest  Treatments tried: ice, knee brace  Associated symptoms: no distal numbness or tingling; denies swelling or warmth    Orthopedic/Surgical history: NO  Social History/Occupation: retired, avid walker      REVIEW OF SYSTEMS:  Review of systems negative unless mentioned in HPI     OBJECTIVE:  BP (!) 143/81   Ht 1.626 m (5' 4\")   Wt 68.9 kg (152 lb)   LMP 05/26/2010   BMI 26.09 kg/m     General: healthy, alert and in no distress  Skin: no suspicious lesions or rash.  CV: distal perfusion intact   Resp: normal respiratory effort without conversational dyspnea   Psych: normal mood and affect  Gait: NORMAL  Neuro: Normal light sensory exam of RL extremity     Right Knee exam  Gait: Normal  Alignment:  [x] Normal  [] Anatomic valgus  [] Anatomic varus  Inspection: [x] Normal  [] Ecchymosis present []Other   Palpation:  Joint Tenderness: [] No Tenderness  [] MJL [] LJL [] MCL Margin [] LCL Margin [] Pes Anserine [] Distal Quadricep  [x] Other-patellar tendon   Peripatellar Tenderness: [x] None [] Lateral pole [] Medial pole [] Superior pole [] Inferior pole    Patellar tendon pain: [] None [x] Present    Patellar apprehension: [x] None [] Present    Patellar motion: [x] Normal [] Abnormal  Crepitus: [x] None [] Present  Effusion: [x] None [] Trace [] 1+ [] 2+ [] 3+  Range of motion:  Flexion: 135, Extension: 0  Strength:  [x] Full in all planes, including intact extensor mechanism [] Limited as described  Neurologic: Normal sensation   Vascular: Normal pulses   Special tests:   Lachman: Negative  Anterior Drawer: Negative  Sag/quad Activation: NP  Posterior Drawer: Negative  Valgus Stress: " Negative at 0/30  Varus Stress: Negative at 0/30  Osmani's: Negative  Thessaly: NP     Other notable findings/comments: None       RADIOLOGY:  Final results and radiologist's interpretation, available in the [facility] health record.  Images were reviewed with the patient in the office today.  My personal interpretation of the performed imaging: Normal joint spacing and alignment of bilateral knees.  No acute bony abnormalities.           Again, thank you for allowing me to participate in the care of your patient.        Sincerely,        Lan Mckenzie, DO

## 2024-06-08 ASSESSMENT — ACTIVITIES OF DAILY LIVING (ADL)
SWELLING: I DO NOT HAVE THE SYMPTOM
PAIN: THE SYMPTOM AFFECTS MY ACTIVITY SLIGHTLY
SIT WITH YOUR KNEE BENT: ACTIVITY IS SOMEWHAT DIFFICULT
STAND: ACTIVITY IS NOT DIFFICULT
STIFFNESS: I DO NOT HAVE THE SYMPTOM
GO DOWN STAIRS: ACTIVITY IS NOT DIFFICULT
GO UP STAIRS: ACTIVITY IS NOT DIFFICULT
WALK: ACTIVITY IS NOT DIFFICULT
GIVING WAY, BUCKLING OR SHIFTING OF KNEE: I DO NOT HAVE THE SYMPTOM
WEAKNESS: I DO NOT HAVE THE SYMPTOM
LIMPING: I DO NOT HAVE THE SYMPTOM
SWELLING: I DO NOT HAVE THE SYMPTOM
RISE FROM A CHAIR: ACTIVITY IS NOT DIFFICULT
STAND: ACTIVITY IS NOT DIFFICULT
RAW_SCORE: 63
LIMPING: I DO NOT HAVE THE SYMPTOM
SIT WITH YOUR KNEE BENT: ACTIVITY IS SOMEWHAT DIFFICULT
WALK: ACTIVITY IS NOT DIFFICULT
PAIN: THE SYMPTOM AFFECTS MY ACTIVITY SLIGHTLY
GO UP STAIRS: ACTIVITY IS NOT DIFFICULT
SQUAT: ACTIVITY IS NOT DIFFICULT
HOW_WOULD_YOU_RATE_THE_CURRENT_FUNCTION_OF_YOUR_KNEE_DURING_YOUR_USUAL_DAILY_ACTIVITIES_ON_A_SCALE_FROM_0_TO_100_WITH_100_BEING_YOUR_LEVEL_OF_KNEE_FUNCTION_PRIOR_TO_YOUR_INJURY_AND_0_BEING_THE_INABILITY_TO_PERFORM_ANY_OF_YOUR_USUAL_DAILY_ACTIVITIES?: 90
KNEEL ON THE FRONT OF YOUR KNEE: ACTIVITY IS FAIRLY DIFFICULT
KNEEL ON THE FRONT OF YOUR KNEE: ACTIVITY IS FAIRLY DIFFICULT
WEAKNESS: I DO NOT HAVE THE SYMPTOM
SQUAT: ACTIVITY IS NOT DIFFICULT
GO DOWN STAIRS: ACTIVITY IS NOT DIFFICULT
KNEE_ACTIVITY_OF_DAILY_LIVING_SCORE: 90
AS_A_RESULT_OF_YOUR_KNEE_INJURY,_HOW_WOULD_YOU_RATE_YOUR_CURRENT_LEVEL_OF_DAILY_ACTIVITY?: NORMAL
AS_A_RESULT_OF_YOUR_KNEE_INJURY,_HOW_WOULD_YOU_RATE_YOUR_CURRENT_LEVEL_OF_DAILY_ACTIVITY?: NORMAL
RISE FROM A CHAIR: ACTIVITY IS NOT DIFFICULT
GIVING WAY, BUCKLING OR SHIFTING OF KNEE: I DO NOT HAVE THE SYMPTOM
KNEE_ACTIVITY_OF_DAILY_LIVING_SUM: 63
PLEASE_INDICATE_YOR_PRIMARY_REASON_FOR_REFERRAL_TO_THERAPY:: KNEE
HOW_WOULD_YOU_RATE_THE_OVERALL_FUNCTION_OF_YOUR_KNEE_DURING_YOUR_USUAL_DAILY_ACTIVITIES?: NORMAL
STIFFNESS: I DO NOT HAVE THE SYMPTOM
HOW_WOULD_YOU_RATE_THE_OVERALL_FUNCTION_OF_YOUR_KNEE_DURING_YOUR_USUAL_DAILY_ACTIVITIES?: NORMAL
HOW_WOULD_YOU_RATE_THE_CURRENT_FUNCTION_OF_YOUR_KNEE_DURING_YOUR_USUAL_DAILY_ACTIVITIES_ON_A_SCALE_FROM_0_TO_100_WITH_100_BEING_YOUR_LEVEL_OF_KNEE_FUNCTION_PRIOR_TO_YOUR_INJURY_AND_0_BEING_THE_INABILITY_TO_PERFORM_ANY_OF_YOUR_USUAL_DAILY_ACTIVITIES?: 90

## 2024-06-11 ENCOUNTER — THERAPY VISIT (OUTPATIENT)
Dept: PHYSICAL THERAPY | Facility: CLINIC | Age: 64
End: 2024-06-11
Attending: STUDENT IN AN ORGANIZED HEALTH CARE EDUCATION/TRAINING PROGRAM
Payer: COMMERCIAL

## 2024-06-11 DIAGNOSIS — M76.51 PATELLAR TENDINITIS OF RIGHT KNEE: ICD-10-CM

## 2024-06-11 PROCEDURE — 97110 THERAPEUTIC EXERCISES: CPT | Mod: GP | Performed by: PHYSICAL THERAPIST

## 2024-06-11 PROCEDURE — 97161 PT EVAL LOW COMPLEX 20 MIN: CPT | Mod: GP | Performed by: PHYSICAL THERAPIST

## 2024-06-11 NOTE — PROGRESS NOTES
PHYSICAL THERAPY EVALUATION  Type of Visit: Evaluation    See electronic medical record for Abuse and Falls Screening details.    Subjective       Presenting condition or subjective complaint: Knee pain. R anterior knee pain with kneeling. Pain only with kneeling and any deep flexion, pushing up from it.  Date of onset:      Relevant medical history:     Dates & types of surgery:      Prior diagnostic imaging/testing results: X-ray     Prior therapy history for the same diagnosis, illness or injury: No      Living Environment  Social support: With family members   Type of home: House   Stairs to enter the home: Yes 1 Is there a railing: No   Ramp: No   Stairs inside the home: Yes 12 Is there a railing: Yes   Help at home: None  Equipment owned: Walker with wheels; Bath bench     Employment: No    Hobbies/Interests: Walking gardening reading [employer]    Patient goals for therapy: Kneel without pain     Objective   KNEE EVALUATION  PAIN: Pain Level with Use: 5/10  GAIT:  Gait Deviations: WNL  BALANCE/PROPRIOCEPTION: Single Leg Stance Eyes Open (seconds): 10 seconds each leg  ROM:  PROM R WNL with slight end range pain flexion . All others WNL.   STRENGTH:  fair quad strength R, good L   FUNCTIONAL TESTS:  full squat up/down pain free , 6 inch step down R and L painfree  PALPATION:  TTP R patellar tendon      Assessment & Plan   CLINICAL IMPRESSIONS  Medical Diagnosis: R patellar tendinitis    Treatment Diagnosis: R patellar tendinitis   Impression/Assessment: Patient is a 63 year old female with R patellar tendinitis complaints.  The following significant findings have been identified: Pain, Decreased strength, Decreased proprioception, Inflammation, Impaired muscle performance, and Decreased activity tolerance. These impairments interfere with their ability to perform self care tasks, work tasks, recreational activities, household chores, household mobility, and community mobility as compared to previous level of  function.     Clinical Decision Making (Complexity):  Clinical Presentation: Stable/Uncomplicated  Clinical Presentation Rationale: based on medical and personal factors listed in PT evaluation  Clinical Decision Making (Complexity): Low complexity    PLAN OF CARE  Treatment Interventions:  Modalities: Cryotherapy  Interventions: Manual Therapy, Neuromuscular Re-education, Therapeutic Activity, Therapeutic Exercise, Self-Care/Home Management    Long Term Goals     PT Goal 1  Rationale: to maximize safety and independence with performance of ADLs and functional tasks;to maximize safety and independence within the home;to maximize safety and independence within the community;to maximize safety and independence with transportation;to maximize safety and independence with self cares  Target Date: 08/06/24      Frequency of Treatment:    Duration of Treatment:      Recommended Referrals to Other Professionals: Physical Therapy  Education Assessment:   Learner/Method: Patient;Pictures/Video    Risks and benefits of evaluation/treatment have been explained.   Patient/Family/caregiver agrees with Plan of Care.     Evaluation Time:             Signing Clinician: Tello Aggarwal, PT,OCS

## 2024-06-15 PROBLEM — M76.51 PATELLAR TENDINITIS OF RIGHT KNEE: Status: ACTIVE | Noted: 2024-06-15

## 2024-07-08 ENCOUNTER — THERAPY VISIT (OUTPATIENT)
Dept: PHYSICAL THERAPY | Facility: CLINIC | Age: 64
End: 2024-07-08
Payer: COMMERCIAL

## 2024-07-08 DIAGNOSIS — M76.51 PATELLAR TENDINITIS OF RIGHT KNEE: Primary | ICD-10-CM

## 2024-07-08 PROCEDURE — 97110 THERAPEUTIC EXERCISES: CPT | Mod: GP | Performed by: PHYSICAL THERAPIST

## 2024-07-08 ASSESSMENT — ACTIVITIES OF DAILY LIVING (ADL)
STIFFNESS: I DO NOT HAVE THE SYMPTOM
RISE FROM A CHAIR: ACTIVITY IS NOT DIFFICULT
GO UP STAIRS: ACTIVITY IS NOT DIFFICULT
SQUAT: ACTIVITY IS NOT DIFFICULT
AS_A_RESULT_OF_YOUR_KNEE_INJURY,_HOW_WOULD_YOU_RATE_YOUR_CURRENT_LEVEL_OF_DAILY_ACTIVITY?: NORMAL
KNEE_ACTIVITY_OF_DAILY_LIVING_SCORE: 97.14
WALK: ACTIVITY IS NOT DIFFICULT
SIT WITH YOUR KNEE BENT: ACTIVITY IS NOT DIFFICULT
SWELLING: I DO NOT HAVE THE SYMPTOM
HOW_WOULD_YOU_RATE_THE_CURRENT_FUNCTION_OF_YOUR_KNEE_DURING_YOUR_USUAL_DAILY_ACTIVITIES_ON_A_SCALE_FROM_0_TO_100_WITH_100_BEING_YOUR_LEVEL_OF_KNEE_FUNCTION_PRIOR_TO_YOUR_INJURY_AND_0_BEING_THE_INABILITY_TO_PERFORM_ANY_OF_YOUR_USUAL_DAILY_ACTIVITIES?: 95
HOW_WOULD_YOU_RATE_THE_OVERALL_FUNCTION_OF_YOUR_KNEE_DURING_YOUR_USUAL_DAILY_ACTIVITIES?: NORMAL
GIVING WAY, BUCKLING OR SHIFTING OF KNEE: I DO NOT HAVE THE SYMPTOM
GO DOWN STAIRS: ACTIVITY IS NOT DIFFICULT
LIMPING: I DO NOT HAVE THE SYMPTOM
WEAKNESS: I DO NOT HAVE THE SYMPTOM
RAW_SCORE: 68
STAND: ACTIVITY IS NOT DIFFICULT
KNEEL ON THE FRONT OF YOUR KNEE: ACTIVITY IS MINIMALLY DIFFICULT
KNEE_ACTIVITY_OF_DAILY_LIVING_SUM: 68
PAIN: I HAVE THE SYMPTOM BUT IT DOES NOT AFFECT MY ACTIVITY

## 2024-09-12 PROBLEM — M76.51 PATELLAR TENDINITIS OF RIGHT KNEE: Status: RESOLVED | Noted: 2024-06-15 | Resolved: 2024-09-12

## 2024-09-12 NOTE — PROGRESS NOTES
Patient did not return for further treatment and no additional progress was noted.  Please refer to the progress note and goal flowsheet for discharge information.

## 2024-09-24 ENCOUNTER — HOSPITAL ENCOUNTER (OUTPATIENT)
Dept: MAMMOGRAPHY | Facility: CLINIC | Age: 64
Discharge: HOME | End: 2024-09-24
Attending: FAMILY MEDICINE | Admitting: FAMILY MEDICINE
Payer: COMMERCIAL

## 2024-09-24 DIAGNOSIS — Z12.31 ENCOUNTER FOR SCREENING MAMMOGRAM FOR BREAST CANCER: ICD-10-CM

## 2024-09-24 PROCEDURE — 77063 BREAST TOMOSYNTHESIS BI: CPT

## 2024-10-22 ENCOUNTER — OFFICE VISIT (OUTPATIENT)
Dept: ORTHOPEDICS | Facility: CLINIC | Age: 64
End: 2024-10-22
Payer: COMMERCIAL

## 2024-10-22 ENCOUNTER — ANCILLARY PROCEDURE (OUTPATIENT)
Dept: GENERAL RADIOLOGY | Facility: CLINIC | Age: 64
End: 2024-10-22
Attending: FAMILY MEDICINE
Payer: COMMERCIAL

## 2024-10-22 VITALS
HEIGHT: 64 IN | BODY MASS INDEX: 25.95 KG/M2 | SYSTOLIC BLOOD PRESSURE: 141 MMHG | DIASTOLIC BLOOD PRESSURE: 89 MMHG | WEIGHT: 152 LBS

## 2024-10-22 DIAGNOSIS — M79.671 RIGHT FOOT PAIN: Primary | ICD-10-CM

## 2024-10-22 DIAGNOSIS — S92.324S CLOSED NONDISPLACED FRACTURE OF SECOND METATARSAL BONE OF RIGHT FOOT, SEQUELA: ICD-10-CM

## 2024-10-22 DIAGNOSIS — M79.671 RIGHT FOOT PAIN: ICD-10-CM

## 2024-10-22 DIAGNOSIS — M77.51 BURSITIS OF INTERMETATARSAL BURSA OF RIGHT FOOT: ICD-10-CM

## 2024-10-22 PROCEDURE — 99204 OFFICE O/P NEW MOD 45 MIN: CPT | Performed by: FAMILY MEDICINE

## 2024-10-22 PROCEDURE — 73630 X-RAY EXAM OF FOOT: CPT | Mod: RT | Performed by: FAMILY MEDICINE

## 2024-10-22 RX ORDER — MELOXICAM 15 MG/1
15 TABLET ORAL DAILY
Qty: 30 TABLET | Refills: 0 | Status: SHIPPED | OUTPATIENT
Start: 2024-10-22

## 2024-10-22 NOTE — PROGRESS NOTES
ASSESSMENT & PLAN  Patient Instructions     1. Right foot pain    2. Bursitis of intermetatarsal bursa of right foot    3. Closed nondisplaced fracture of second metatarsal bone of right foot, sequela      -Patient has acute right foot pain after dropping a heavy object initially likely due to a nondisplaced second metatarsal fracture which has now healed and intermetatarsal bursitis.  Patient also has a significant bilateral hallux valgus deformity which is currently not causing any symptoms  -X-rays taken office today of the right foot shows a healed nondisplaced fracture with remodeling seen.  Patient has a large hallux valgus deformity with significant degenerative changes of the first MTP joint  -Patient will start meloxicam 15 mg daily for the next 2 to 3 weeks and then as needed  -Will start formal physical therapy and home exercise program  -Patient will focus on low impact exercises such as biking and elliptical for cardiovascular exercise.  -Patient will follow-up if pain does not improve  -Call direct clinic number [[phone]] at any time with questions or concerns.    Albert Yeo MD [facility]  [facility] [place]        -----    SUBJECTIVE  Gaby Aguilar is a/an 64 year old female who is seen as a self referral for evaluation of right foot pain. The patient is seen by themselves.    Onset: 3 month(s) ago. Patient describes injury as dropping a heavy object on foot  Location of Pain: right 3rd metatarsal   Rating of Pain at worst: 7/10  Rating of Pain Currently: 4/10  Worsened by: Being barefoot   Better with: Supportive shoes   Treatments tried: rest/activity avoidance, ice, and compression   Associated symptoms: bruising   Orthopedic history: NO  Relevant surgical history: YES - Date: 1980 bunion surgery on right foot   Social history: social history: retired    Past Medical History:   Diagnosis Date    Allergic rhinitis 05/27/2011     Problem list name  updated by automated process. 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   Tobacco Use    Smoking status: Former     Current packs/day: 0.00     Average packs/day: 0.5 packs/day for 6.0 years (3.0 ttl pk-yrs)     Types: Cigarettes     Start date: 3/26/1976     Quit date: 3/26/1982     Years since quittin.6     Passive exposure: Current    Smokeless tobacco: Never   Substance and Sexual Activity    Alcohol use: Yes     Alcohol/week: 14.0 standard drinks of alcohol     Types: 14 Standard drinks or equivalent per week    Drug use: No    Sexual activity: Yes     Partners: Male     Birth control/protection: Post-menopausal     Comment:    Other Topics Concern     Service No    Blood Transfusions No    Caffeine Concern Yes    Occupational Exposure No    Hobby Hazards No    Sleep Concern No    Stress Concern No    Weight Concern No    Special Diet No    Exercise Yes    Seat Belt Yes    Self-Exams Yes     Social Determinants of Health     Financial Resource Strain: Low Risk  (2024)    Financial Resource Strain     Within the past 12 months, have you or your family members you live with been unable to get utilities (heat, electricity) when it was really needed?: No   Food Insecurity: Low Risk  (2024)    Food Insecurity     Within the past 12 months, did you worry that your food would run out before you got money to buy more?: No     Within the past 12 months, did the food you bought just not last and you didn t have money to get more?: No   Transportation Needs: Low Risk  (2024)    Transportation Needs     Within the past 12 months, has lack of transportation kept you from medical appointments,  "getting your medicines, non-medical meetings or appointments, work, or from getting things that you need?: No   Physical Activity: Sufficiently Active (5/24/2024)    Exercise Vital Sign     Days of Exercise per Week: 5 days     Minutes of Exercise per Session: 60 min   Stress: No Stress Concern Present (5/24/2024)    [place] [place] [employer] - Occupational Stress Questionnaire     Feeling of Stress : Only a little   Social Connections: Socially Isolated (5/24/2024)    Social Connection and Isolation Panel [NHANES]     Frequency of Communication with Friends and Family: More than three times a week     Frequency of Social Gatherings with Friends and Family: Once a week     Attends Latter day Services: Never     Active Member of Clubs or Organizations: No     Attends Club or Organization Meetings: Never     Marital Status:    Interpersonal Safety: Low Risk  (5/24/2024)    Interpersonal Safety     Do you feel physically and emotionally safe where you currently live?: Yes     Within the past 12 months, have you been hit, slapped, kicked or otherwise physically hurt by someone?: No     Within the past 12 months, have you been humiliated or emotionally abused in other ways by your partner or ex-partner?: No   Housing Stability: Low Risk  (5/24/2024)    Housing Stability     Do you have housing? : Yes     Are you worried about losing your housing?: No         Patient's past medical, surgical, social, and family histories were reviewed today and no changes are noted.    REVIEW OF SYSTEMS:  10 point ROS is negative other than symptoms noted above in HPI, Past Medical History or as stated below  Constitutional: NEGATIVE for fever, chills, change in weight  Skin: NEGATIVE for worrisome rashes, moles or lesions  GI/: NEGATIVE for bowel or bladder changes  Neuro: NEGATIVE for weakness, dizziness or paresthesias    OBJECTIVE:  BP (!) 141/89   Ht 1.626 m (5' 4\")   Wt 68.9 kg (152 lb)   LMP 05/26/2010   " BMI 26.09 kg/m     General: healthy, alert and in no distress  HEENT: no scleral icterus or conjunctival erythema  Skin: no suspicious lesions or rash. No jaundice.  CV:  no pedal edema  Resp: normal respiratory effort without conversational dyspnea   Psych: normal mood and affect  Gait: normal steady gait with appropriate coordination and balance  Neuro: Normal light sensory exam of lower extremity  MSK:  RIGHT FOOT  Inspection:    no swelling or ecchymosis  Palpation:    Tender about the 2nd, 3rd interdigital web space and 1st, 2nd metatarsals. Otherwise remainder of bony/ligamentous landmarks are non-tender.  Range of Motion:     Grossly intact and non-painful  Strength:    Grossly intact in all planes  Special Tests:    Positive: none    Negative: MTP stress      Independent visualization of the below image:  Recent Results (from the past 24 hour(s))   XR Foot Right G/E 3 Views    Narrative    Second metatarsal shaft cortical irregularity and remodeling likely due to   an old healed nondisplaced fracture.  Hallux valgus deformity of the first   MTP joint with significant osteophytes and joint space narrowing.           Albert Yeo MD [facility]

## 2024-10-22 NOTE — PATIENT INSTRUCTIONS
1. Right foot pain    2. Bursitis of intermetatarsal bursa of right foot    3. Closed nondisplaced fracture of second metatarsal bone of right foot, sequela      -Patient has acute right foot pain after dropping a heavy object initially likely due to a nondisplaced second metatarsal fracture which has now healed and intermetatarsal bursitis.  Patient also has a significant bilateral hallux valgus deformity which is currently not causing any symptoms  -X-rays taken office today of the right foot shows a healed nondisplaced fracture with remodeling seen.  Patient has a large hallux valgus deformity with significant degenerative changes of the first MTP joint  -Patient will start meloxicam 15 mg daily for the next 2 to 3 weeks and then as needed  -Will start formal physical therapy and home exercise program  -Patient will focus on low impact exercises such as biking and elliptical for cardiovascular exercise.  -Patient will follow-up if pain does not improve  -Call direct clinic number [[phone]] at any time with questions or concerns.    Albert Yeo MD [facility]  [facility] [place]

## 2024-10-22 NOTE — LETTER
10/22/2024      Gaby Aguilar  [address] [facility] [id number]      Dear Colleague,    Thank you for referring your patient, Gaby Aguilar, to the [facility] [place]. Please see a copy of my visit note below.    ASSESSMENT & PLAN  Patient Instructions     1. Right foot pain    2. Bursitis of intermetatarsal bursa of right foot    3. Closed nondisplaced fracture of second metatarsal bone of right foot, sequela      -Patient has acute right foot pain after dropping a heavy object initially likely due to a nondisplaced second metatarsal fracture which has now healed and intermetatarsal bursitis.  Patient also has a significant bilateral hallux valgus deformity which is currently not causing any symptoms  -X-rays taken office today of the right foot shows a healed nondisplaced fracture with remodeling seen.  Patient has a large hallux valgus deformity with significant degenerative changes of the first MTP joint  -Patient will start meloxicam 15 mg daily for the next 2 to 3 weeks and then as needed  -Will start formal physical therapy and home exercise program  -Patient will focus on low impact exercises such as biking and elliptical for cardiovascular exercise.  -Patient will follow-up if pain does not improve  -Call direct clinic number [[phone]] at any time with questions or concerns.    Albert Yeo MD [facility]  [facility]        -----    SUBJECTIVE  Gaby Aguilar is a/an 64 year old female who is seen as a self referral for evaluation of right foot pain. The patient is seen by themselves.    Onset: 3 month(s) ago. Patient describes injury as dropping a heavy object on foot  Location of Pain: right 3rd metatarsal   Rating of Pain at worst: 7/10  Rating of Pain Currently: 4/10  Worsened by: Being barefoot   Better with: Supportive shoes   Treatments tried: rest/activity avoidance, ice, and compression    Associated symptoms: bruising   Orthopedic history: NO  Relevant surgical history: YES - Date:  bunion surgery on right foot   Social history: social history: retired    Past Medical History:   Diagnosis Date     Allergic rhinitis 2011     Problem list name updated by automated process. 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   Tobacco Use     Smoking status: Former     Current packs/day: 0.00     Average packs/day: 0.5 packs/day for 6.0 years (3.0 ttl pk-yrs)     Types: Cigarettes     Start date: 3/26/1976     Quit date: 3/26/1982     Years since quittin.6     Passive exposure: Current     Smokeless tobacco: Never   Substance and Sexual Activity     Alcohol use: Yes     Alcohol/week: 14.0 standard drinks of alcohol     Types: 14 Standard drinks or equivalent per week     Drug use: No     Sexual activity: Yes     Partners: Male     Birth control/protection: Post-menopausal     Comment:    Other Topics Concern      Service No     Blood Transfusions No     Caffeine Concern Yes     Occupational Exposure No     Hobby Hazards No     Sleep Concern No     Stress Concern No     Weight Concern No     Special Diet No     Exercise Yes     Seat Belt Yes     Self-Exams Yes     Social Determinants of Health     Financial Resource Strain: Low Risk  (2024)    Financial Resource Strain      Within the past 12 months, have you or your family members you live with been unable to get utilities (heat, electricity) when it was really needed?: No   Food Insecurity: Low Risk  (2024)    Food Insecurity      Within the past 12 months, did you worry that your food would run out before you got money to  buy more?: No      Within the past 12 months, did the food you bought just not last and you didn t have money to get more?: No   Transportation Needs: Low Risk  (5/24/2024)    Transportation Needs      Within the past 12 months, has lack of transportation kept you from medical appointments, getting your medicines, non-medical meetings or appointments, work, or from getting things that you need?: No   Physical Activity: Sufficiently Active (5/24/2024)    Exercise Vital Sign      Days of Exercise per Week: 5 days      Minutes of Exercise per Session: 60 min   Stress: No Stress Concern Present (5/24/2024)    [employer] [place] [employer] - Occupational Stress Questionnaire      Feeling of Stress : Only a little   Social Connections: Socially Isolated (5/24/2024)    Social Connection and Isolation Panel [NHANES]      Frequency of Communication with Friends and Family: More than three times a week      Frequency of Social Gatherings with Friends and Family: Once a week      Attends Jewish Services: Never      Active Member of Clubs or Organizations: No      Attends Club or Organization Meetings: Never      Marital Status:    Interpersonal Safety: Low Risk  (5/24/2024)    Interpersonal Safety      Do you feel physically and emotionally safe where you currently live?: Yes      Within the past 12 months, have you been hit, slapped, kicked or otherwise physically hurt by someone?: No      Within the past 12 months, have you been humiliated or emotionally abused in other ways by your partner or ex-partner?: No   Housing Stability: Low Risk  (5/24/2024)    Housing Stability      Do you have housing? : Yes      Are you worried about losing your housing?: No         Patient's past medical, surgical, social, and family histories were reviewed today and no changes are noted.    REVIEW OF SYSTEMS:  10 point ROS is negative other than symptoms noted above in HPI, Past Medical History or as stated  "below  Constitutional: NEGATIVE for fever, chills, change in weight  Skin: NEGATIVE for worrisome rashes, moles or lesions  GI/: NEGATIVE for bowel or bladder changes  Neuro: NEGATIVE for weakness, dizziness or paresthesias    OBJECTIVE:  BP (!) 141/89   Ht 1.626 m (5' 4\")   Wt 68.9 kg (152 lb)   LMP 05/26/2010   BMI 26.09 kg/m     General: healthy, alert and in no distress  HEENT: no scleral icterus or conjunctival erythema  Skin: no suspicious lesions or rash. No jaundice.  CV:  no pedal edema  Resp: normal respiratory effort without conversational dyspnea   Psych: normal mood and affect  Gait: normal steady gait with appropriate coordination and balance  Neuro: Normal light sensory exam of lower extremity  MSK:  RIGHT FOOT  Inspection:    no swelling or ecchymosis  Palpation:    Tender about the 2nd, 3rd interdigital web space and 1st, 2nd metatarsals. Otherwise remainder of bony/ligamentous landmarks are non-tender.  Range of Motion:     Grossly intact and non-painful  Strength:    Grossly intact in all planes  Special Tests:    Positive: none    Negative: MTP stress      Independent visualization of the below image:  Recent Results (from the past 24 hour(s))   XR Foot Right G/E 3 Views    Narrative    Second metatarsal shaft cortical irregularity and remodeling likely due to   an old healed nondisplaced fracture.  Hallux valgus deformity of the first   MTP joint with significant osteophytes and joint space narrowing.           Albert Yeo MD [facility]      Again, thank you for allowing me to participate in the care of your patient.        Sincerely,        Albert Yeo, MD  "

## 2024-10-24 ENCOUNTER — THERAPY VISIT (OUTPATIENT)
Dept: PHYSICAL THERAPY | Facility: CLINIC | Age: 64
End: 2024-10-24
Attending: FAMILY MEDICINE
Payer: COMMERCIAL

## 2024-10-24 DIAGNOSIS — M77.51 BURSITIS OF INTERMETATARSAL BURSA OF RIGHT FOOT: ICD-10-CM

## 2024-10-24 DIAGNOSIS — M79.671 RIGHT FOOT PAIN: ICD-10-CM

## 2024-10-24 PROCEDURE — 97161 PT EVAL LOW COMPLEX 20 MIN: CPT | Mod: GP | Performed by: PHYSICAL THERAPIST

## 2024-10-24 PROCEDURE — 97110 THERAPEUTIC EXERCISES: CPT | Mod: GP | Performed by: PHYSICAL THERAPIST

## 2024-10-24 ASSESSMENT — ACTIVITIES OF DAILY LIVING (ADL)
GETTING_INTO_OR_OUT_OF_A_CAR: NO DIFFICULTY
ROLLING_OVER_IN_BED: NO DIFFICULTY
WALKING_A_MILE: A LITTLE BIT OF DIFFICULTY
RUNNING_ON_EVEN_GROUND: A LITTLE BIT OF DIFFICULTY
SITTING_FOR_1_HOUR: NO DIFFICULTY
SQUATTING: NO DIFFICULTY
PLEASE_INDICATE_YOR_PRIMARY_REASON_FOR_REFERRAL_TO_THERAPY:: FOOT AND/OR ANKLE
PUTTING_ON_YOUR_SHOES_OR_SOCKS: NO DIFFICULTY
MAKING_SHARP_TURNS_WHILE_RUNNING_FAST: A LITTLE BIT OF DIFFICULTY
YOUR_USUAL_HOBBIES,_RECREATIONAL_OR_SPORTING_ACTIVITIES: A LITTLE BIT OF DIFFICULTY
GOING_UP_OR_DOWN_10_STAIRS: NO DIFFICULTY
RUNNING_ON_UNEVEN_GROUND: A LITTLE BIT OF DIFFICULTY
SHOPPING: A LITTLE BIT OF DIFFICULTY
GETTING_INTO_AND_OUT_OF_A_BATH: NO DIFFICULTY
WALKING_BETWEEN_ROOMS: NO DIFFICULTY
LIFTING_AN_OBJECT,_LIKE_A_BAG_OF_GROCERIES_FROM_THE_FLOOR: NO DIFFICULTY
LEFS_RAW_SCORE: 0
WALKING_2_BLOCKS: NO DIFFICULTY
PERFORMING_HEAVY_ACTIVITIES_AROUND_YOUR_HOME: NO DIFFICULTY
PERFORMING_LIGHT_ACTIVITIES_AROUND_YOUR_HOME: NO DIFFICULTY
LEFS_SCORE(%): 0
ANY_OF_YOUR_USUAL_WORK,_HOUSEWORK_OR_SCHOOL_ACTIVITIES: A LITTLE BIT OF DIFFICULTY
STANDING_FOR_1_HOUR: A LITTLE BIT OF DIFFICULTY

## 2024-10-24 NOTE — PROGRESS NOTES
PHYSICAL THERAPY EVALUATION  Type of Visit: Evaluation              Subjective       Patient reporting she had an injury to her right foot about 3 months ago when she dropped something on it in the garage.   Since that time her foot has gotten better, however still a bit painful and wants to prevent future injury.  States she has started some anti-inflammatory meds and reports it has helped by about 50%.   Presenting condition or subjective complaint: (Patient-Rptd) foot injury  Date of onset:      Relevant medical history:     Dates & types of surgery: (Patient-Rptd) bunion 1980    hysterectomy 2019    Prior diagnostic imaging/testing results: (Patient-Rptd) X-ray     Prior therapy history for the same diagnosis, illness or injury: (Patient-Rptd) No        Living Environment  Social support: (Patient-Rptd) With family members   Type of home: (Patient-Rptd) House; 1 level; Basement   Stairs to enter the home:         Ramp: (Patient-Rptd) No   Stairs inside the home: (Patient-Rptd) Yes (Patient-Rptd) 12 Is there a railing: (Patient-Rptd) Yes     Help at home: (Patient-Rptd) None  Equipment owned:       Employment: (Patient-Rptd) No    Hobbies/Interests: (Patient-Rptd) walking gardening reading watch tv visit friends    Patient goals for therapy: (Patient-Rptd) long walks outside   would like to be able to 2-3 miles pain free    Pain assessment: no pain at rest- increases with walking     Objective   FOOT/ANKLE EVALUATION  PAIN: seated at rest- 0/10 pain,   intermittent with activity  INTEGUMENTARY (edema, incisions): slight edema noted right between 1-2, 2-3 metatarsaks  POSTURE: Standing Posture: Rounded shoulders, Forward head  Sitting Posture: Rounded shoulders, Forward head  GAIT:   Weightbearing Status: WBAT  Assistive Device(s): None  Gait Deviations: WNL  BALANCE/PROPRIOCEPTION:  right decreased SLS  WEIGHT BEARING ALIGNMENT: symmetrical , slight arch drop  NON-WEIGHTBEARING ALIGNMENT: bilateral minimal  flexible arch  ROM:   (Degrees) Left AROM Left PROM  Right AROM Right PROM   Ankle Dorsiflexion 5 8 10 12   Ankle Plantarflexion 155 160 145 150   Ankle Inversion 60 65 55 60   Ankle Eversion 15 20 10 15   Great Toe Flexion tight  tight    Great Toe Extension WFL  Limited compared to left    Pain:   End feel:     STRENGTH:   Pain: - none + mild ++ moderate +++ severe  Strength Scale: 0-5/5 Left Right   Ankle Dorsiflexion 5 5   Ankle Plantarflexion 4 4   Ankle Inversion 4- 4-   Ankle Eversion 4- 4-   Great Toe Flexion 4- 4-   Great Toe Extension 4 4   Anterior Tibialis 4 4   Posterior Tibialis 4 4   Peroneals 4 4-   Extensor Digitorum 4 4   Gastroc/Soleus DNT DNT     FLEXIBILITY: overall good mobility throughout foot, slight decreased mobility ankle    Assessment & Plan   CLINICAL IMPRESSIONS  Medical Diagnosis: Right foot pain  Bursitis of intermetatarsal bursa of right foot    Treatment Diagnosis: Right foot pain Bursitis of intermetatarsal bursa of right foot   Impression/Assessment: Patient is a 64 year old female with Right foot pain Bursitis of intermetatarsal bursa of right foot  complaints.  The following significant findings have been identified: Pain, Decreased strength, Impaired balance, Impaired muscle performance, and Decreased activity tolerance. These impairments interfere with their ability to perform self care tasks, work tasks, recreational activities, household chores, driving , household mobility, and community mobility as compared to previous level of function.     Clinical Decision Making (Complexity):  Clinical Presentation: Stable/Uncomplicated  Clinical Presentation Rationale: based on medical and personal factors listed in PT evaluation  Clinical Decision Making (Complexity): Low complexity    PLAN OF CARE  Treatment Interventions:  Interventions: Gait Training, Manual Therapy, Neuromuscular Re-education, Therapeutic Activity, Therapeutic Exercise, Self-Care/Home Management    Long Term  Goals     PT Goal 1  Goal Identifier: walking\  Goal Description: Patient will report ability to walk 1 mile with no foot pain  Rationale: to maximize safety and independence with performance of ADLs and functional tasks;to maximize safety and independence within the home;to maximize safety and independence within the community;to maximize safety and independence with transportation;to maximize safety and independence with self cares  Target Date: 01/24/24      Frequency of Treatment: 2-3/month, decreased as needed  Duration of Treatment: 1-3 months    Recommended Referrals to Other Professionals:   Education Assessment:   Learner/Method: No Barriers to Learning  Education Comments: no concerns    Risks and benefits of evaluation/treatment have been explained.   Patient/Family/caregiver agrees with Plan of Care.     Evaluation Time:     PT Eval, Low Complexity Minutes (62852): 30       Signing Clinician: Mira Cummings PT

## 2024-11-04 ENCOUNTER — THERAPY VISIT (OUTPATIENT)
Dept: PHYSICAL THERAPY | Facility: CLINIC | Age: 64
End: 2024-11-04
Attending: FAMILY MEDICINE
Payer: COMMERCIAL

## 2024-11-04 DIAGNOSIS — M77.51 BURSITIS OF INTERMETATARSAL BURSA OF RIGHT FOOT: ICD-10-CM

## 2024-11-04 DIAGNOSIS — M79.671 RIGHT FOOT PAIN: Primary | ICD-10-CM

## 2024-11-04 PROCEDURE — 97112 NEUROMUSCULAR REEDUCATION: CPT | Mod: GP | Performed by: PHYSICAL THERAPIST

## 2024-11-04 PROCEDURE — 97110 THERAPEUTIC EXERCISES: CPT | Mod: GP | Performed by: PHYSICAL THERAPIST

## 2024-11-15 ENCOUNTER — OFFICE VISIT (OUTPATIENT)
Dept: FAMILY MEDICINE | Facility: CLINIC | Age: 64
End: 2024-11-15

## 2024-11-15 ENCOUNTER — HOSPITAL ENCOUNTER (OUTPATIENT)
Dept: CT IMAGING | Facility: CLINIC | Age: 64
Discharge: HOME | End: 2024-11-15
Attending: FAMILY MEDICINE
Payer: COMMERCIAL

## 2024-11-15 VITALS
OXYGEN SATURATION: 99 % | DIASTOLIC BLOOD PRESSURE: 84 MMHG | SYSTOLIC BLOOD PRESSURE: 134 MMHG | HEART RATE: 79 BPM | TEMPERATURE: 97.9 F

## 2024-11-15 DIAGNOSIS — R39.9 URINARY SYMPTOM OR SIGN: Primary | ICD-10-CM

## 2024-11-15 DIAGNOSIS — K57.92 ACUTE DIVERTICULITIS: ICD-10-CM

## 2024-11-15 DIAGNOSIS — R10.31 ABDOMINAL PAIN, RIGHT LOWER QUADRANT: ICD-10-CM

## 2024-11-15 LAB
% GRANULOCYTES: 64.9 %
APPEARANCE UR: CLEAR
BACTERIA, UR: ABNORMAL
BILIRUB UR QL: ABNORMAL
CASTS/LPF: ABNORMAL
COLOR UR: YELLOW
EP/HPF: ABNORMAL
GLUCOSE URINE: ABNORMAL MG/DL
HCT VFR BLD AUTO: 46.7 % (ref 35–47)
HEMOGLOBIN: 15.2 G/DL (ref 11.7–15.7)
HGB UR QL: ABNORMAL
KETONES UR QL: 15 MG/DL
LYMPHOCYTES NFR BLD AUTO: 26 %
MCH RBC QN AUTO: 32.3 PG (ref 26–33)
MCHC RBC AUTO-ENTMCNC: 32.5 G/DL (ref 31–36)
MCV RBC AUTO: 99.2 FL (ref 78–100)
MISC.: ABNORMAL
MONOCYTES NFR BLD AUTO: 9.1 %
NITRITE UR QL STRIP: ABNORMAL
PH UR STRIP: 5.5 PH (ref 5–7)
PLATELET COUNT - QUEST: 204 10^9/L (ref 150–375)
PROT UR QL: 30 MG/DL
RBC # BLD AUTO: 4.71 10*12/L (ref 3.8–5.2)
RBC, UR MICRO: ABNORMAL (ref ?–2)
SP GR UR STRIP: 1.02
UROBILINOGEN UR QL STRIP: 0.2 EU/DL (ref 0.2–1)
WBC # BLD AUTO: 8.2 10*9/L (ref 4–11)
WBC #/AREA URNS HPF: ABNORMAL /[HPF]
WBC, UR MICRO: ABNORMAL (ref ?–2)

## 2024-11-15 PROCEDURE — 74177 CT ABD & PELVIS W/CONTRAST: CPT

## 2024-11-15 PROCEDURE — 250N000009 HC RX 250: Performed by: FAMILY MEDICINE

## 2024-11-15 PROCEDURE — 250N000011 HC RX IP 250 OP 636: Performed by: FAMILY MEDICINE

## 2024-11-15 RX ORDER — IOPAMIDOL 755 MG/ML
500 INJECTION, SOLUTION INTRAVASCULAR ONCE
Status: COMPLETED | OUTPATIENT
Start: 2024-11-15 | End: 2024-11-15

## 2024-11-15 RX ORDER — AMOXICILLIN AND CLAVULANATE POTASSIUM 500; 125 MG/1; MG/1
1 TABLET, FILM COATED ORAL 3 TIMES DAILY
Qty: 42 TABLET | Refills: 0 | Status: SHIPPED | OUTPATIENT
Start: 2024-11-15 | End: 2024-11-29

## 2024-11-15 RX ADMIN — IOPAMIDOL 76 ML: 755 INJECTION, SOLUTION INTRAVENOUS at 13:22

## 2024-11-15 RX ADMIN — SODIUM CHLORIDE 59 ML: 9 INJECTION, SOLUTION INTRAVENOUS at 13:22

## 2024-11-15 NOTE — NURSING NOTE
Chief Complaint   Patient presents with    UTI     Bladder pressure and pain started Wednesday, feeling like she needs to have a bowel movement     Pre-visit Screening:  Immunizations:  up to date  Colonoscopy:  is up to date  Mammogram: is up to date  Asthma Action Test/Plan:  NA  PHQ9:  NA  GAD7:  NA  Questioned patient about current smoking habits Pt. quit smoking some time ago.  Ok to leave detailed message on voice mail for today's visit only Yes, phone # [phone]

## 2024-11-15 NOTE — PROGRESS NOTES
"Assessment & Plan   Problem List Items Addressed This Visit    None  Visit Diagnoses       Urinary symptom or sign    -  Primary    Relevant Orders    URINALYSIS, ROUTINE ([facility]) (Completed)    URINE CULTURE AEROBIC BACTERIAL ([facility]) (Completed)    Abdominal pain, right lower quadrant        Relevant Orders    VENOUS COLLECTION (Completed)    HEMOGRAM PLATELET DIFF ([facility]) (Completed)    CT Abdomen w Contrast    CT Abdomen Pelvis w Contrast (Completed)    Acute diverticulitis        Relevant Medications    amoxicillin-clavulanate (AUGMENTIN) 500-125 MG tablet           1. Urinary symptom or sign (Primary)    - URINALYSIS, ROUTINE ([facility])  - URINE CULTURE AEROBIC BACTERIAL ([facility])    2. Abdominal pain, right lower quadrant  The urine is normal, cbc in range. But this may be diverticulitis. Ct abd.  - VENOUS COLLECTION  - HEMOGRAM PLATELET DIFF ([facility])  - CT Abdomen w Contrast; Future  - CT Abdomen Pelvis w Contrast; Future    3. Acute diverticulitis  Ct scan shows acute diverticulitis. She had some concerns about antibiotics. Start augmentin, recheck in clinic next week.  - amoxicillin-clavulanate (AUGMENTIN) 500-125 MG tablet; Take 1 tablet by mouth 3 times daily for 14 days.  Dispense: 42 tablet; Refill: 0            BMI  Estimated body mass index is 26.09 kg/m  as calculated from the following:    Height as of 10/22/24: 1.626 m (5' 4\").    Weight as of 10/22/24: 68.9 kg (152 lb).         FUTURE APPOINTMENTS:       - Follow-up visit next week. We manage her chronic medical care.    No follow-ups on file.    Constanza Johnson MD  [facility]    Subjective     Nursing Notes:   Rozina Florentino CMA  11/15/2024 10:40 AM  Signed  Chief Complaint   Patient presents with    UTI     Bladder pressure and pain started Wednesday, feeling like she needs to have a bowel movement     Pre-visit Screening:  Immunizations:  up to date  Colonoscopy:  is up to date  Mammogram: is up to date  Asthma Action Test/Plan:  " NA  PHQ9:  NA  GAD7:  NA  Questioned patient about current smoking habits Pt. quit smoking some time ago.  Ok to leave detailed message on voice mail for today's visit only Yes, phone # [phone]       Gaby Aguilar is a 64 year old female who presents to clinic today for the following health issues   HPI       Pain in right mid lower abd. Started Wednesday night, felt like she had to have a bm but has had several. Started a probiotic. Thinks she is eating and drinking plenty. Temp yesterday at 99. No blood or black stools. No loose stools.no dysuria. k      Review of Systems   Constitutional, HEENT, cardiovascular, pulmonary, gi and gu systems are negative, except as otherwise noted.      Objective    /84 (BP Location: Left arm, Patient Position: Sitting, Cuff Size: Adult Large)   Pulse 79   Temp 97.9  F (36.6  C) (Temporal)   LMP 05/26/2010   SpO2 99%   There is no height or weight on file to calculate BMI.  Physical Exam   GENERAL: alert and no distress  RESP: lungs clear to auscultation - no rales, rhonchi or wheezes  CV: regular rate and rhythm, normal S1 S2, no S3 or S4, no murmur, click or rub, no peripheral edema  MS: no gross musculoskeletal defects noted, no edema  PSYCH: mentation appears normal, affect normal/bright  Tender to right lower abd to palpation, no rebound and no guarding    Results for orders placed or performed during the hospital encounter of 11/15/24   CT Abdomen Pelvis w Contrast     Status: None    Narrative    CT ABDOMEN PELVIS W CONTRAST 11/15/2024 1:32 PM    CLINICAL HISTORY: Abdominal pain, right lower quadrant    TECHNIQUE: CT scan of the abdomen and pelvis was performed following  injection of IV contrast. Multiplanar reformats were obtained. Dose  reduction techniques were used.  CONTRAST: 76mL Isovue-[id number]    COMPARISON: None.    FINDINGS:   LOWER CHEST: Normal.    HEPATOBILIARY: Hepatic steatosis.    PANCREAS: Normal.    SPLEEN: Normal spleen. Few small splenules in  the left upper quadrant.    ADRENAL GLANDS: Normal.    KIDNEYS/BLADDER: Normal.    BOWEL: Colonic diverticulosis without circumferential wall thickening  and inflammatory changes around the diverticula segment of the distal  sigmoid colon. No small bowel or colonic obstruction. Normal appendix.  Moderate amount of formed stool in the colon.    PELVIC ORGANS: Hysterectomy. No pelvic masses.    ADDITIONAL FINDINGS: Trace free fluid in the pelvis. No abscess or  free air. No lymphadenopathy. No abdominal aortic aneurysm.    MUSCULOSKELETAL: No suspicious lesions in the bones.      Impression    IMPRESSION:   1.  Acute uncomplicated sigmoid diverticulitis.  2.  Hepatic steatosis.    Findings were discussed with Dr. Johnson at 2:40 PM.    JACKSON SIMPSON MD         SYSTEM ID:  [id number]   Results for orders placed or performed in visit on 11/15/24   URINALYSIS, ROUTINE ([facility])     Status: Abnormal   Result Value Ref Range    Color Urine Yellow     Appearance Urine Clear     Glucose Urine Neg mg/dL    Bilirubin Urine Neg     Ketones Urine 15 (A) mg/dL    Specific Gravity Urine 1.025     Blood Urine Neg     pH Urine 5.5 pH    Protein Urine 30 (A) neg - neg mg/dL    Urobilinogen Urine 0.2 EU/dL    Nitrite Urine Neg     Leukocytes trace (A)     Wbc, Urine Micro 1-2 neg - 2    RBC Micro Urine neg neg - 2    EP/HPF neg     Bacteria Urine few (A) neg - neg    Casts/LPF neg     Miscellaneous neg    URINE CULTURE AEROBIC BACTERIAL ([facility])     Status: None   Result Value Ref Range    Urine Voided Culture SEE NOTE    HEMOGRAM PLATELET DIFF ([facility])     Status: None   Result Value Ref Range    WBC 8.2 4.0 - 11 10*9/L    RBC Count 4.71 3.8 - 5.2 10*12/L    Hemoglobin 15.2 11.7 - 15.7 g/dL    Hematocrit 46.7 35.0 - 47.0 %    MCV 99.2 78 - 100 fL    MCH 32.3 26 - 33 pg    MCHC 32.5 31 - 36 g/dL    Platelet Count 204 150 - 375 10^9/L    % Granulocytes 64.9 %    % Lymphocytes 26.0 %    % Monocytes 9.1 %

## 2024-11-17 LAB — URINE VOIDED CULTURE: NORMAL

## 2024-11-20 ENCOUNTER — OFFICE VISIT (OUTPATIENT)
Dept: CARDIOLOGY | Facility: CLINIC | Age: 64
End: 2024-11-20
Attending: PHYSICIAN ASSISTANT
Payer: COMMERCIAL

## 2024-11-20 VITALS
WEIGHT: 153 LBS | HEART RATE: 66 BPM | DIASTOLIC BLOOD PRESSURE: 80 MMHG | HEIGHT: 64 IN | BODY MASS INDEX: 26.12 KG/M2 | OXYGEN SATURATION: 97 % | SYSTOLIC BLOOD PRESSURE: 122 MMHG

## 2024-11-20 DIAGNOSIS — I48.0 PAROXYSMAL ATRIAL FIBRILLATION (H): Primary | ICD-10-CM

## 2024-11-20 RX ORDER — METOPROLOL SUCCINATE 25 MG/1
25 TABLET, EXTENDED RELEASE ORAL DAILY
Qty: 90 TABLET | Refills: 4 | Status: SHIPPED | OUTPATIENT
Start: 2024-11-20

## 2024-11-20 RX ORDER — LACTOBACILLUS RHAMNOSUS GG 10B CELL
1 CAPSULE ORAL 2 TIMES DAILY
COMMUNITY

## 2024-11-20 NOTE — PROGRESS NOTES
PHYSICIAN NOTE:  This visit was completed in person at the [facility].      I had the pleasure of seeing Ms. Gaby Aguilar for evaluation of atrial fibrillation.      Pleasant 64-year-old female with the following medical issues:  Paroxysmal atrial fibrillation with RVR. Pharmacologic treatment was limited by bradycardia. Catheter ablation (PVI, incomplete CTI line due to challenging isthmus anatomy) in 10/2023.  [id number] of 1 or 2 (gender +/- HTN).   Sinus node dysfunction. Post AF conversion pauses.  Previous negative testing for sleep apnea.  Borderline hypertension.    Gaby is feeling well. She has had no AF that she knows of since her EP procedure in 10/2023. She has a [employer] mobile device and checks her heart rhythm 2-3 times per month.     No sudden onset dizziness, syncope or near syncope. No chest discomfort during exertion.    She brought in the list of BP measurements. Approximately 30% of the time SBP is over 140 mmHg.      PHYSICAL EXAMINATION:  Vital signs: 120/80, 66, 69.4 kg, BMI 26.3  General:  in no apparent distress  ENT/Mouth:  no nasal discharge.  Eyes:  normal conjunctivae.   Chest/Lungs:  patient is not dyspneic.  Lungs CTA, without rales or wheezing  Cardiovascular: normal JVP, rhythm is regular.  No gallop, murmur or rub.    Abdomen:  no abdominal distention.    Extremities:  no edema  Skin:  no xanthelasma.    Neurologic:  alert & oriented x 3.    Vascular:  2+ carotids without bruits.         DIAGNOSTIC STUDIES (reviewed/interpreted in the clinic today):  Laboratory studies: potassium 4.37, creatinine 0.69, cholesterol 24, , HDL 49, hematocrit 46.7%.  ECG: sinus rhythm, atrial conduction abnormality, otherwise normal.      IMPRESSION:  Paroxysmal atrial fibrillation with RVR. No symptomatic AF at the moment. Anticoagulation is not necessary with [id number] of 1 or 2; we will have to readdress after she turns 65 next year. She inquired whether she can stop  metoprolol. Because her BP is borderline high, I recommended she continue it. So far she has not experienced side-effects from the medication.  Sick sinus syndrome with sinus rhythm post AF conversion pauses. Remains asymptomatic.  Borderline hypertension. See above, ~30% of home BP readings show abnormal SBP >140 mmHg.    RECOMMENDATIONS:  Continue metoprolol, it was refilled.  7-day monitor in follow-up with EP KATY in one year.    It was my pleasure seeing this delightful patient.  Please feel free to call with any questions.   Total time spent today, including time for chart review and documentation, was 30 minutes.    The longitudinal plan of care for the diagnosis(es)/condition(s) as documented were addressed during this visit. Due to the added complexity in care, I will continue to support Gaby in the subsequent management and with ongoing continuity of care.     Jessica Way MD, [facility]      (Chart documentation was completed, in part, using Dragon voice-recognition software. The note was reviewed, however grammatical and spelling errors may be present.)      Orders Placed This Encounter   Procedures    EKG 12-lead complete w/read - Clinics (performed today)       Orders Placed This Encounter   Medications    Cholecalciferol (VITAMIN D-3 PO)     Sig: Take by mouth.    lactobacillus rhamnosus, GG, (CULTURELL) capsule     Sig: Take 1 capsule by mouth 2 times daily.    metoprolol succinate ER (TOPROL XL) 25 MG 24 hr tablet     Sig: Take 1 tablet (25 mg) by mouth daily.     Dispense:  90 tablet     Refill:  4       Medications Discontinued During This Encounter   Medication Reason    biotin 1000 MCG TABS tablet Stopped by Patient (No AVS)    metoprolol succinate ER (TOPROL XL) 25 MG 24 hr tablet Reorder (No AVS)         Encounter Diagnosis   Name Primary?    Paroxysmal atrial fibrillation (H)        CURRENT MEDICATIONS:  Current Outpatient Medications   Medication Sig Dispense Refill    amoxicillin-clavulanate  (AUGMENTIN) 500-125 MG tablet Take 1 tablet by mouth 3 times daily for 14 days. 42 tablet 0    Ascorbic Acid (VITAMIN C) 500 MG CAPS Unknown dose at this time      Calcium Carbonate-Vitamin D (CALCIUM + D) 600-200 MG-UNIT per tablet Take 2 tablets by mouth daily       Cholecalciferol (VITAMIN D-3 PO) Take by mouth.      ketoconazole (NIZORAL) 2 % external shampoo Use every 1-2 days when flared. Leave in few minutes before rinsing. Use twice weekly to prevent flares. 120 mL 11    lactobacillus rhamnosus, GG, (CULTURELL) capsule Take 1 capsule by mouth 2 times daily.      metoprolol succinate ER (TOPROL XL) 25 MG 24 hr tablet Take 1 tablet (25 mg) by mouth daily. 90 tablet 4    Multiple Vitamins-Calcium (ONE-A-DAY WOMENS FORMULA) TABS Take 1 tablet by mouth daily.      UNABLE TO FIND MEDICATION NAME: [employer] Allegra      VITAMIN E NATURAL PO          ALLERGIES     Allergies   Allergen Reactions    Sulfa Antibiotics Hives       PAST MEDICAL HISTORY:  Past Medical History:   Diagnosis Date    Allergic rhinitis 05/27/2011     Problem list name updated by automated process. Provider to review    Arthritis     hands    Diffuse cystic mastopathy 12/27/2012    History of basal cell carcinoma     Hx of atrial flutter 12/14/2018       PAST SURGICAL HISTORY:  Past Surgical History:   Procedure Laterality Date    EP ABLATION FOCAL AFIB N/A 10/3/2023    Procedure: Ablation Atrial Fibrilation;  Surgeon: Jessica Way MD;  Location:  [facility]    HC CORRECT BUNION,SIMPLE Right 1978    HC TOOTH EXTRACTION W/FORCEP  age 20    LAPAROSCOPIC HYSTERECTOMY TOTAL, BILATERAL SALPINGO-OOPHORECTOMY, COMBINED Bilateral 3/6/2019    Procedure: Single port total laparoscopic hysterectomy bilateral salpingo-oopherectomy;  Surgeon: Laisha Kathleen MD;  Location:  OR       FAMILY HISTORY:  Family History   Problem Relation Age of Onset    Cancer Mother         presacral cancer    Hypertension Mother     Hypertension  Father     Prostate Cancer Father     Diabetes Maternal Grandmother     Breast Cancer Maternal Aunt     C.A.D. 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   Tobacco Use    Smoking status: Former     Current packs/day: 0.00     Average packs/day: 0.5 packs/day for 6.0 years (3.0 ttl pk-yrs)     Types: Cigarettes     Start date: 3/26/1976     Quit date: 3/26/1982     Years since quittin.6     Passive exposure: Current    Smokeless tobacco: Never   Substance and Sexual Activity    Alcohol use: Yes     Alcohol/week: 14.0 standard drinks of alcohol     Types: 14 Standard drinks or equivalent per week    Drug use: No    Sexual activity: Yes     Partners: Male     Birth control/protection: Post-menopausal     Comment:    Other Topics Concern     Service No    Blood Transfusions No    Caffeine Concern Yes    Occupational Exposure No    Hobby Hazards No    Sleep Concern No    Stress Concern No    Weight Concern No    Special Diet No    Exercise Yes    Seat Belt Yes    Self-Exams Yes     Social Drivers of Health     Financial Resource Strain: Low Risk  (2024)    Financial Resource Strain     Within the past 12 months, have you or your family members you live with been unable to get utilities (heat, electricity) when it was really needed?: No   Food Insecurity: Low Risk  (2024)    Food Insecurity     Within the past 12 months, did you worry that your food would run out before you got money to buy more?: No     Within the past 12 months, did the food you bought just not last and you didn t have money to get more?: No   Transportation Needs: Low Risk  (2024)    Transportation Needs     Within the past 12 months, has lack of transportation  kept you from medical appointments, getting your medicines, non-medical meetings or appointments, work, or from getting things that you need?: No   Physical Activity: Sufficiently Active (5/24/2024)    Exercise Vital Sign     Days of Exercise per Week: 5 days     Minutes of Exercise per Session: 60 min   Stress: No Stress Concern Present (5/24/2024)    Citizen of [place] of Occupational Health - Occupational Stress Questionnaire     Feeling of Stress : Only a little   Social Connections: Socially Isolated (5/24/2024)    Social Connection and Isolation Panel [NHANES]     Frequency of Communication with Friends and Family: More than three times a week     Frequency of Social Gatherings with Friends and Family: Once a week     Attends Buddhist Services: Never     Active Member of Clubs or Organizations: No     Attends Club or Organization Meetings: Never     Marital Status:    Interpersonal Safety: Low Risk  (5/24/2024)    Interpersonal Safety     Do you feel physically and emotionally safe where you currently live?: Yes     Within the past 12 months, have you been hit, slapped, kicked or otherwise physically hurt by someone?: No     Within the past 12 months, have you been humiliated or emotionally abused in other ways by your partner or ex-partner?: No   Housing Stability: Low Risk  (5/24/2024)    Housing Stability     Do you have housing? : Yes     Are you worried about losing your housing?: No

## 2024-11-20 NOTE — LETTER
11/20/2024    Constanza Johnson MD  [address]  [facility] [address]    RE: Gaby Aguilar       Dear Colleague,     I had the pleasure of seeing Gaby Aguilar in the [facility].  PHYSICIAN NOTE:  This visit was completed in person at the [facility].      I had the pleasure of seeing Ms. Gaby Aguilar for evaluation of atrial fibrillation.      Pleasant 64-year-old female with the following medical issues:  Paroxysmal atrial fibrillation with RVR. Pharmacologic treatment was limited by bradycardia. Catheter ablation (PVI, incomplete CTI line due to challenging isthmus anatomy) in 10/2023.  [id number] of 1 or 2 (gender +/- HTN).   Sinus node dysfunction. Post AF conversion pauses.  Previous negative testing for sleep apnea.  Borderline hypertension.    Gaby is feeling well. She has had no AF that she knows of since her EP procedure in 10/2023. She has a [employer] mobile device and checks her heart rhythm 2-3 times per month.     No sudden onset dizziness, syncope or near syncope. No chest discomfort during exertion.    She brought in the list of BP measurements. Approximately 30% of the time SBP is over 140 mmHg.      PHYSICAL EXAMINATION:  Vital signs: 120/80, 66, 69.4 kg, BMI 26.3  General:  in no apparent distress  ENT/Mouth:  no nasal discharge.  Eyes:  normal conjunctivae.   Chest/Lungs:  patient is not dyspneic.  Lungs CTA, without rales or wheezing  Cardiovascular: normal JVP, rhythm is regular.  No gallop, murmur or rub.    Abdomen:  no abdominal distention.    Extremities:  no edema  Skin:  no xanthelasma.    Neurologic:  alert & oriented x 3.    Vascular:  2+ carotids without bruits.         DIAGNOSTIC STUDIES (reviewed/interpreted in the clinic today):  Laboratory studies: potassium 4.37, creatinine 0.69, cholesterol 24, , HDL 49, hematocrit 46.7%.  ECG: sinus rhythm, atrial conduction abnormality, otherwise normal.      IMPRESSION:  Paroxysmal atrial  fibrillation with RVR. No symptomatic AF at the moment. Anticoagulation is not necessary with [id number] of 1 or 2; we will have to readdress after she turns 65 next year. She inquired whether she can stop metoprolol. Because her BP is borderline high, I recommended she continue it. So far she has not experienced side-effects from the medication.  Sick sinus syndrome with sinus rhythm post AF conversion pauses. Remains asymptomatic.  Borderline hypertension. See above, ~30% of home BP readings show abnormal SBP >140 mmHg.    RECOMMENDATIONS:  Continue metoprolol, it was refilled.  7-day monitor in follow-up with EP KATY in one year.    It was my pleasure seeing this delightful patient.  Please feel free to call with any questions.   Total time spent today, including time for chart review and documentation, was 30 minutes.    The longitudinal plan of care for the diagnosis(es)/condition(s) as documented were addressed during this visit. Due to the added complexity in care, I will continue to support Gaby in the subsequent management and with ongoing continuity of care.     Jessica Way MD, FACC      (Chart documentation was completed, in part, using Dragon voice-recognition software. The note was reviewed, however grammatical and spelling errors may be present.)      Orders Placed This Encounter   Procedures     EKG 12-lead complete w/read - Clinics (performed today)       Orders Placed This Encounter   Medications     Cholecalciferol (VITAMIN D-3 PO)     Sig: Take by mouth.     lactobacillus rhamnosus, GG, (CULTURELL) capsule     Sig: Take 1 capsule by mouth 2 times daily.     metoprolol succinate ER (TOPROL XL) 25 MG 24 hr tablet     Sig: Take 1 tablet (25 mg) by mouth daily.     Dispense:  90 tablet     Refill:  4       Medications Discontinued During This Encounter   Medication Reason     biotin 1000 MCG TABS tablet Stopped by Patient (No AVS)     metoprolol succinate ER (TOPROL XL) 25 MG 24 hr tablet  Reorder (No AVS)         Encounter Diagnosis   Name Primary?     Paroxysmal atrial fibrillation (H)        CURRENT MEDICATIONS:  Current Outpatient Medications   Medication Sig Dispense Refill     amoxicillin-clavulanate (AUGMENTIN) 500-125 MG tablet Take 1 tablet by mouth 3 times daily for 14 days. 42 tablet 0     Ascorbic Acid (VITAMIN C) 500 MG CAPS Unknown dose at this time       Calcium Carbonate-Vitamin D (CALCIUM + D) 600-200 MG-UNIT per tablet Take 2 tablets by mouth daily        Cholecalciferol (VITAMIN D-3 PO) Take by mouth.       ketoconazole (NIZORAL) 2 % external shampoo Use every 1-2 days when flared. Leave in few minutes before rinsing. Use twice weekly to prevent flares. 120 mL 11     lactobacillus rhamnosus, GG, (CULTURELL) capsule Take 1 capsule by mouth 2 times daily.       metoprolol succinate ER (TOPROL XL) 25 MG 24 hr tablet Take 1 tablet (25 mg) by mouth daily. 90 tablet 4     Multiple Vitamins-Calcium (ONE-A-DAY WOMENS FORMULA) TABS Take 1 tablet by mouth daily.       UNABLE TO FIND MEDICATION NAME: [employer] Allegra       VITAMIN E NATURAL PO          ALLERGIES     Allergies   Allergen Reactions     Sulfa Antibiotics Hives       PAST MEDICAL HISTORY:  Past Medical History:   Diagnosis Date     Allergic rhinitis 05/27/2011     Problem list name updated by automated process. Provider to review     Arthritis     hands     Diffuse cystic mastopathy 12/27/2012     History of basal cell carcinoma      Hx of atrial flutter 12/14/2018       PAST SURGICAL HISTORY:  Past Surgical History:   Procedure Laterality Date     EP ABLATION FOCAL AFIB N/A 10/3/2023    Procedure: Ablation Atrial Fibrilation;  Surgeon: Jessica Way MD;  Location:  [facility]ATH LAB     HC CORRECT BUNION,SIMPLE Right 1978     HC TOOTH EXTRACTION W/FORCEP  age 20     LAPAROSCOPIC HYSTERECTOMY TOTAL, BILATERAL SALPINGO-OOPHORECTOMY, COMBINED Bilateral 3/6/2019    Procedure: Single port total laparoscopic  hysterectomy bilateral salpingo-oopherectomy;  Surgeon: Laisha Kathleen MD;  Location: [facility] OR       FAMILY HISTORY:  Family History   Problem Relation Age of Onset     Cancer Mother         presacral cancer     Hypertension Mother      Hypertension Father      Prostate Cancer Father      Diabetes Maternal Grandmother      Breast Cancer Maternal Aunt      C.A.D. 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   Tobacco Use     Smoking status: Former     Current packs/day: 0.00     Average packs/day: 0.5 packs/day for 6.0 years (3.0 ttl pk-yrs)     Types: Cigarettes     Start date: 3/26/1976     Quit date: 3/26/1982     Years since quittin.6     Passive exposure: Current     Smokeless tobacco: Never   Substance and Sexual Activity     Alcohol use: Yes     Alcohol/week: 14.0 standard drinks of alcohol     Types: 14 Standard drinks or equivalent per week     Drug use: No     Sexual activity: Yes     Partners: Male     Birth control/protection: Post-menopausal     Comment:    Other Topics Concern      Service No     Blood Transfusions No     Caffeine Concern Yes     Occupational Exposure No     Hobby Hazards No     Sleep Concern No     Stress Concern No     Weight Concern No     Special Diet No     Exercise Yes     Seat Belt Yes     Self-Exams Yes     Social Drivers of Health     Financial Resource Strain: Low Risk  (2024)    Financial Resource Strain      Within the past 12 months, have you or your family members you live with been unable to get utilities (heat, electricity) when it was really needed?: No   Food Insecurity: Low Risk  (2024)    Food Insecurity      Within the past 12 months, did you worry that your food would run out  before you got money to buy more?: No      Within the past 12 months, did the food you bought just not last and you didn t have money to get more?: No   Transportation Needs: Low Risk  (5/24/2024)    Transportation Needs      Within the past 12 months, has lack of transportation kept you from medical appointments, getting your medicines, non-medical meetings or appointments, work, or from getting things that you need?: No   Physical Activity: Sufficiently Active (5/24/2024)    Exercise Vital Sign      Days of Exercise per Week: 5 days      Minutes of Exercise per Session: 60 min   Stress: No Stress Concern Present (5/24/2024)    [employer] [place] [employer] - Occupational Stress Questionnaire      Feeling of Stress : Only a little   Social Connections: Socially Isolated (5/24/2024)    Social Connection and Isolation Panel [NHANES]      Frequency of Communication with Friends and Family: More than three times a week      Frequency of Social Gatherings with Friends and Family: Once a week      Attends Anabaptism Services: Never      Active Member of Clubs or Organizations: No      Attends Club or Organization Meetings: Never      Marital Status:    Interpersonal Safety: Low Risk  (5/24/2024)    Interpersonal Safety      Do you feel physically and emotionally safe where you currently live?: Yes      Within the past 12 months, have you been hit, slapped, kicked or otherwise physically hurt by someone?: No      Within the past 12 months, have you been humiliated or emotionally abused in other ways by your partner or ex-partner?: No   Housing Stability: Low Risk  (5/24/2024)    Housing Stability      Do you have housing? : Yes      Are you worried about losing your housing?: No               Thank you for allowing me to participate in the care of your patient.      Sincerely,     Jessica Way MD     [facility]  cc:   June Christian,  HONEY  [address]  [place] [id number]

## 2024-11-27 ENCOUNTER — TRANSFERRED RECORDS (OUTPATIENT)
Dept: FAMILY MEDICINE | Facility: CLINIC | Age: 64
End: 2024-11-27

## 2024-12-30 ENCOUNTER — TRANSFERRED RECORDS (OUTPATIENT)
Dept: FAMILY MEDICINE | Facility: CLINIC | Age: 64
End: 2024-12-30

## 2025-01-17 PROBLEM — D12.6 ADENOMATOUS POLYP OF COLON: Status: ACTIVE | Noted: 2025-01-17

## 2025-01-20 ENCOUNTER — PATIENT OUTREACH (OUTPATIENT)
Dept: GASTROENTEROLOGY | Facility: CLINIC | Age: 65
End: 2025-01-20
Payer: COMMERCIAL

## 2025-02-26 ENCOUNTER — OFFICE VISIT (OUTPATIENT)
Dept: DERMATOLOGY | Facility: CLINIC | Age: 65
End: 2025-02-26
Attending: NURSE PRACTITIONER
Payer: COMMERCIAL

## 2025-02-26 DIAGNOSIS — L82.1 SEBORRHEIC KERATOSES: ICD-10-CM

## 2025-02-26 DIAGNOSIS — L21.9 SEBORRHEIC DERMATITIS: ICD-10-CM

## 2025-02-26 DIAGNOSIS — D18.01 CHERRY ANGIOMA: ICD-10-CM

## 2025-02-26 DIAGNOSIS — D22.9 MULTIPLE BENIGN NEVI: ICD-10-CM

## 2025-02-26 DIAGNOSIS — Z12.83 ENCOUNTER FOR SCREENING FOR MALIGNANT NEOPLASM OF SKIN: ICD-10-CM

## 2025-02-26 DIAGNOSIS — L81.4 LENTIGINES: ICD-10-CM

## 2025-02-26 DIAGNOSIS — L57.0 AK (ACTINIC KERATOSIS): Primary | ICD-10-CM

## 2025-02-26 DIAGNOSIS — L73.8 SEBACEOUS HYPERPLASIA OF FACE: ICD-10-CM

## 2025-02-26 RX ORDER — KETOCONAZOLE 20 MG/ML
SHAMPOO, SUSPENSION TOPICAL
Qty: 120 ML | Refills: 11 | Status: SHIPPED | OUTPATIENT
Start: 2025-02-26

## 2025-02-26 NOTE — PROGRESS NOTES
[facility] Dermatology Note  Encounter Date: Feb 26, 2025  Office Visit     Reviewed patients past medical history and pertinent chart review prior to patients visit today.     Dermatology Problem List:  History of BCC on the left nasal ala 10/22 at outside clinic  Seb derm, ketoconazole shampoo given 2/21/2024   AK nasal tip, cryo 2/21/2024  ISK, cryo    Actinic keratosis on the left upper cutaneous lip, not treated at visit 2/26/2025 due to upcoming vacation  ____________________________________________    Assessment & Plan:     # History of BCC on left nasal ala at outside clinic. Well healed scar without signs of recurrence.     # Actinic keratosis, vertex scalp. Premalignant nature discussed with patient. Treatment options discussed with patient today including no treatment, topical treatment, and cryotherapy. Patient elects to treat visible lesions today with cryotherapy. After verbal consent and discussion of risks and benefits including but no limited to dyspigmentation/scar, blister, and pain. A total of 1 actinic keratoses were treated with 1-2mm freeze border for 2 cycle with liquid nitrogen. Post cryotherapy instructions were provided.   -Ak on left upper cutaneous lip, patient declines treatment today as she is going on vacation next week. Will folllow up in a few months for recheck and treatment at that time if still present.     # right chest, ill defined pink patch without definative signs of malignancy. Recheck in 3 months      - If lesion grows, changes, becomes symptomatic, bleeds without trauma, or becomes concerning to patient for any reason I would like to see them back for follow up to recheck for signs of malignancy. I discussed this with patient and patient agrees.        # Seborrheic dermatitis, scalp.  Continue ketoconazole 2% shampoo to use every 1-2 days while rash is present, and ongoing 1-3 times weekly when rash is well controlled. Advised to leave on for 2-5  minutes before rinsing.    # Benign skin findings including: sebaceous hyperplasia, seborrheic keratoses, cherry angioma, lentigines and benign nevi.   - No further intervention required. Patient to report changes.   - Patient reassured of the benign nature of these lesions.    #Signs and Symptoms of non-melanoma skin cancer and ABCDEs of melanoma reviewed with patient. Patient encouraged to perform monthly self skin exams and educated on how to perform them. UV precautions reviewed with patient. Patient was asked about new or changing moles/lesions on body.     #Reviewed Sunscreen: Apply 20 minutes prior to going outdoors and reapply every two hours, when wet or sweating. We recommend using an SPF 30 or higher, and to use one that is water resistant.       Follow-up:  1 years for follow up full body skin exam, prn for new or changing lesions or new concerns    Donna Thorne, [facility]  Dermatology   ____________________________________________    CC: Skin Check ([facility]/Concerns: Dry patch upper lip./Recheck itchy / flakey scalp)    HPI:  Ms. Gaby Aguilar is a(n) 64 year old female who presents today as a return patient for a full body skin cancer screening.  She has been using the ketoconazole 2% shampoo to the scalp and still has a crusted spot on the top of the scalp that stays consistently crusted.  Sometimes she picks it off and it grows back.  She also has a scaly crusted spot about her left upper lip.  She does not want to do anything with this today as she has an upcoming vacation next week in [place].    Patient is otherwise feeling well, without additional skin concerns.     Physical Exam:  Vitals: LMP 05/26/2010   SKIN: Total skin including the genitalia areas was performed. The exam included the head/face, neck, both arms, chest, back, abdomen, both legs, digits, mons pubis, genitals, buttock and nails.   -ill defined area of slight pink discoloration with one linear edge that is raised and a tan cerebriform area  at base of raised ridge  -left upper cutaneous lip has some pink scaly papule  -vertex scalp, crustd pink scay papule  -well healed scar on left nasal ala  -There are yellow oily papules with central dells on the face including on the left nasal sidewall as noted in HPI  -Pink scaly linear papule on the nasal tip  -several 1-2mm red dome shaped symmetric papules scattered on the trunk  -multiple tan/brown flat round macules and raised papules scattered throughout trunk, extremities and head. No worrisome features for malignancy noted on examination.  -scattered tan, homogenous macules scattered on sun exposed areas of trunk, extremities and face.   -scattered waxy, stuck on tan/brown papules and patches on the trunk chest and back as noted in HPI    - No other lesions of concern on areas examined.     Medications:  Current Outpatient Medications   Medication Sig Dispense Refill    ketoconazole (NIZORAL) 2 % external shampoo Use every 1-2 days when flared. Leave in few minutes before rinsing. Use twice weekly to prevent flares. 120 mL 11    Ascorbic Acid (VITAMIN C) 500 MG CAPS Unknown dose at this time      Calcium Carbonate-Vitamin D (CALCIUM + D) 600-200 MG-UNIT per tablet Take 2 tablets by mouth daily       Cholecalciferol (VITAMIN D-3 PO) Take by mouth.      lactobacillus rhamnosus, GG, (CULTURELL) capsule Take 1 capsule by mouth 2 times daily.      metoprolol succinate ER (TOPROL XL) 25 MG 24 hr tablet Take 1 tablet (25 mg) by mouth daily. 90 tablet 4    Multiple Vitamins-Calcium (ONE-A-DAY WOMENS FORMULA) TABS Take 1 tablet by mouth daily.      UNABLE TO FIND MEDICATION NAME: [employer] Allegra      VITAMIN E NATURAL PO        No current facility-administered medications for this visit.      Past Medical History:   Patient Active Problem List   Diagnosis    Allergic rhinitis    ACP (advance care planning)    Diffuse cystic mastopathy    Atrophic vaginitis    Hx of atrial flutter    Atrial flutter (H)--followed by  cardiology    Esophageal reflux    Localized osteoarthritis of hand    Pitted nails    Mass of upper inner quadrant of right breast    Adenomatous polyp of colon     Past Medical History:   Diagnosis Date    Allergic rhinitis 05/27/2011     Problem list name updated by automated process. Provider to review    Arthritis     hands    Diffuse cystic mastopathy 12/27/2012    History of basal cell carcinoma     Hx of atrial flutter 12/14/2018       CC No referring provider defined for this encounter. on close of this encounter.

## 2025-02-26 NOTE — LETTER
2/26/2025      Gaby Aguilar  [address]   [place] [address]      Dear Colleague,    Thank you for referring your patient, Gaby Aguilar, to the [facility]. Please see a copy of my visit note below.    [facility] Dermatology Note  Encounter Date: Feb 26, 2025  Office Visit     Reviewed patients past medical history and pertinent chart review prior to patients visit today.     Dermatology Problem List:  History of BCC on the left nasal ala 10/22 at outside clinic  Seb derm, ketoconazole shampoo given 2/21/2024   AK nasal tip, cryo 2/21/2024  ISK, cryo    Actinic keratosis on the left upper cutaneous lip, not treated at visit 2/26/2025 due to upcoming vacation  ____________________________________________    Assessment & Plan:     # History of BCC on left nasal ala at outside clinic. Well healed scar without signs of recurrence.     # Actinic keratosis, vertex scalp. Premalignant nature discussed with patient. Treatment options discussed with patient today including no treatment, topical treatment, and cryotherapy. Patient elects to treat visible lesions today with cryotherapy. After verbal consent and discussion of risks and benefits including but no limited to dyspigmentation/scar, blister, and pain. A total of 1 actinic keratoses were treated with 1-2mm freeze border for 2 cycle with liquid nitrogen. Post cryotherapy instructions were provided.   -Ak on left upper cutaneous lip, patient declines treatment today as she is going on vacation next week. Will folllow up in a few months for recheck and treatment at that time if still present.     # right chest, ill defined pink patch without definative signs of malignancy. Recheck in 3 months      - If lesion grows, changes, becomes symptomatic, bleeds without trauma, or becomes concerning to patient for any reason I would like to see them back for follow up to recheck for signs of malignancy. I discussed  this with patient and patient agrees.        # Seborrheic dermatitis, scalp.  Continue ketoconazole 2% shampoo to use every 1-2 days while rash is present, and ongoing 1-3 times weekly when rash is well controlled. Advised to leave on for 2-5 minutes before rinsing.    # Benign skin findings including: sebaceous hyperplasia, seborrheic keratoses, cherry angioma, lentigines and benign nevi.   - No further intervention required. Patient to report changes.   - Patient reassured of the benign nature of these lesions.    #Signs and Symptoms of non-melanoma skin cancer and ABCDEs of melanoma reviewed with patient. Patient encouraged to perform monthly self skin exams and educated on how to perform them. UV precautions reviewed with patient. Patient was asked about new or changing moles/lesions on body.     #Reviewed Sunscreen: Apply 20 minutes prior to going outdoors and reapply every two hours, when wet or sweating. We recommend using an SPF 30 or higher, and to use one that is water resistant.       Follow-up:  1 years for follow up full body skin exam, prn for new or changing lesions or new concerns    Donna Thorne, [facility]  Dermatology   ____________________________________________    [facility]Concerns: Dry patch upper lip./Recheck itchy / flakey scalp)    HPI:  Ms. Gaby Aguilar is a(n) 64 year old female who presents today as a return patient for a full body skin cancer screening.  She has been using the ketoconazole 2% shampoo to the scalp and still has a crusted spot on the top of the scalp that stays consistently crusted.  Sometimes she picks it off and it grows back.  She also has a scaly crusted spot about her left upper lip.  She does not want to do anything with this today as she has an upcoming vacation next week in [place].    Patient is otherwise feeling well, without additional skin concerns.     Physical Exam:  Vitals: LMP 05/26/2010   SKIN: Total skin including the genitalia areas was performed.  The exam included the head/face, neck, both arms, chest, back, abdomen, both legs, digits, mons pubis, genitals, buttock and nails.   -ill defined area of slight pink discoloration with one linear edge that is raised and a tan cerebriform area at base of raised ridge  -left upper cutaneous lip has some pink scaly papule  -vertex scalp, crustd pink scay papule  -well healed scar on left nasal ala  -There are yellow oily papules with central dells on the face including on the left nasal sidewall as noted in HPI  -Pink scaly linear papule on the nasal tip  -several 1-2mm red dome shaped symmetric papules scattered on the trunk  -multiple tan/brown flat round macules and raised papules scattered throughout trunk, extremities and head. No worrisome features for malignancy noted on examination.  -scattered tan, homogenous macules scattered on sun exposed areas of trunk, extremities and face.   -scattered waxy, stuck on tan/brown papules and patches on the trunk chest and back as noted in HPI    - No other lesions of concern on areas examined.     Medications:  Current Outpatient Medications   Medication Sig Dispense Refill     ketoconazole (NIZORAL) 2 % external shampoo Use every 1-2 days when flared. Leave in few minutes before rinsing. Use twice weekly to prevent flares. 120 mL 11     Ascorbic Acid (VITAMIN C) 500 MG CAPS Unknown dose at this time       Calcium Carbonate-Vitamin D (CALCIUM + D) 600-200 MG-UNIT per tablet Take 2 tablets by mouth daily        Cholecalciferol (VITAMIN D-3 PO) Take by mouth.       lactobacillus rhamnosus, GG, (CULTURELL) capsule Take 1 capsule by mouth 2 times daily.       metoprolol succinate ER (TOPROL XL) 25 MG 24 hr tablet Take 1 tablet (25 mg) by mouth daily. 90 tablet 4     Multiple Vitamins-Calcium (ONE-A-DAY WOMENS FORMULA) TABS Take 1 tablet by mouth daily.       UNABLE TO FIND MEDICATION NAME: [employer] Allegra       VITAMIN E NATURAL PO        No current facility-administered  medications for this visit.      Past Medical History:   Patient Active Problem List   Diagnosis     Allergic rhinitis     ACP (advance care planning)     Diffuse cystic mastopathy     Atrophic vaginitis     Hx of atrial flutter     Atrial flutter (H)--followed by cardiology     Esophageal reflux     Localized osteoarthritis of hand     Pitted nails     Mass of upper inner quadrant of right breast     Adenomatous polyp of colon     Past Medical History:   Diagnosis Date     Allergic rhinitis 05/27/2011     Problem list name updated by automated process. Provider to review     Arthritis     hands     Diffuse cystic mastopathy 12/27/2012     History of basal cell carcinoma      Hx of atrial flutter 12/14/2018       CC No referring provider defined for this encounter. on close of this encounter.      Again, thank you for allowing me to participate in the care of your patient.        Sincerely,        FERNANDEZ Gonzalez CNP    Electronically signed

## 2025-02-26 NOTE — PATIENT INSTRUCTIONS
Proper skin care from [facility]:    -Eliminate harsh soaps as they strip the natural oils from the skin, often resulting in dry itchy skin ( i.e. Dial, Zest, [place])  -Use mild soaps such as Cetaphil or Dove Sensitive Skin in the shower. You do not need to use soap on arms, legs, and trunk every time you shower unless visibly soiled.   -Avoid hot or cold showers.  -After showering, lightly dry off and apply moisturizing within 2-3 minutes. This will help trap moisture in the skin.   -Aggressive use of a moisturizer at least 1-2 times a day to the entire body (including -Vanicream, Cetaphil, Aquaphor or Cerave) and moisturize hands after every washing.  -We recommend using moisturizers that come in a tub that needs to be scooped out, not a pump. This has more of an oil base. It will hold moisture in your skin much better than a water base moisturizer. The above recommended are non-pore clogging.      Wear a sunscreen with at least SPF 30 on your face, ears, neck and V of the chest daily. Wear sunscreen on other areas of the body if those areas are exposed to the sun throughout the day. Sunscreens can contain physical and/or chemical blockers. Physical blockers are less likely to clog pores, these include zinc oxide and titanium dioxide. Reapply every two hour and after swimming.     Sunscreen examples: https://www.ewg.org/sunscreen/    UV radiation  UVA radiation remains constant throughout the day and throughout the year. It is a longer wavelength than UVB and therefore penetrates deeper into the skin leading to immediate and delayed tanning, photoaging, and skin cancer. 70-80% of UVA and UVB radiation occurs between the hours of 10am-2pm.  UVB radiation  UVB radiation causes the most harmful effects and is more significant during the summer months. However, snow and ice can reflect UVB radiation leading to skin damage during the winter months as well. UVB radiation is responsible for tanning,  burning, inflammation, delayed erythema (pinkness), pigmentation (brown spots), and skin cancer.     I recommend self monthly full body exams and yearly full body exams with a dermatology provider. If you develop a new or changing lesion please follow up for examination. Most skin cancers are pink and scaly or pink and pearly. However, we do see blue/brown/black skin cancers.  Consider the ABCDEs of melanoma when giving yourself your monthly full body exam ( don't forget the groin, buttocks, feet, toes, etc). A-asymmetry, B-borders, C-color, D-diameter, E-elevation or evolving. If you see any of these changes please follow up in clinic. If you cannot see your back I recommend purchasing a hand held mirror to use with a larger wall mirror.       Checking for Skin Cancer  You can find cancer early by checking your skin each month. There are 3 kinds of skin cancer. They are melanoma, basal cell carcinoma, and squamous cell carcinoma. Doing monthly skin checks is the best way to find new marks or skin changes. Follow the instructions below for checking your skin.   The ABCDEs of checking moles for melanoma   Check your moles or growths for signs of melanoma using ABCDE:   Asymmetry: the sides of the mole or growth don t match  Border: the edges are ragged, notched, or blurred  Color: the color within the mole or growth varies  Diameter: the mole or growth is larger than 6 mm (size of a pencil eraser)  Evolving: the size, shape, or color of the mole or growth is changing (evolving is not shown in the images below)    Checking for other types of skin cancer  Basal cell carcinoma or squamous cell carcinoma have symptoms such as:     A spot or mole that looks different from all other marks on your skin  Changes in how an area feels, such as itching, tenderness, or pain  Changes in the skin's surface, such as oozing, bleeding, or scaliness  A sore that does not heal  New swelling or redness beyond the border of a  mole    Who s at risk?  Anyone can get skin cancer. But you are at greater risk if you have:   Fair skin, light-colored hair, or light-colored eyes  Many moles or abnormal moles on your skin  A history of sunburns from sunlight or tanning beds  A family history of skin cancer  A history of exposure to radiation or chemicals  A weakened immune system  If you have had skin cancer in the past, you are at risk for recurring skin cancer.   How to check your skin  Do your monthly skin checkups in front of a full-length mirror. Check all parts of your body, including your:   Head (ears, face, neck, and scalp)  Torso (front, back, and sides)  Arms (tops, undersides, upper, and lower armpits)  Hands (palms, backs, and fingers, including under the nails)  Buttocks and genitals  Legs (front, back, and sides)  Feet (tops, soles, toes, including under the nails, and between toes)  If you have a lot of moles, take digital photos of them each month. Make sure to take photos both up close and from a distance. These can help you see if any moles change over time.   Most skin changes are not cancer. But if you see any changes in your skin, call your doctor right away. Only he or she can diagnose a problem. If you have skin cancer, seeing your doctor can be the first step toward getting the treatment that could save your life.   [employer] last reviewed this educational content on 4/1/2019 2000-2020 [employer]. [address] [place] [address], [place] [address]. All rights reserved. This information is not intended as a substitute for professional medical care. Always follow your healthcare professional's instructions.       When should I call my doctor?  If you are worsening or not improving, please, contact us or seek urgent care as noted below.     Who should I call with questions (adults)?    [facility] [phone]  For urgent needs outside of business hours call the [facility] at  [phone] and ask for the dermatology resident on call to be paged  If this is a medical emergency and you are unable to reach an ER, Call [phone]      If you need a prescription refill, please contact your pharmacy. Refills are approved or denied by our Physicians during normal business hours, Monday through Friday.  Per office policy, refills will not be granted if you have not been seen within the past year (or sooner depending on the condition).

## 2025-04-01 ENCOUNTER — OFFICE VISIT (OUTPATIENT)
Dept: FAMILY MEDICINE | Facility: CLINIC | Age: 65
End: 2025-04-01

## 2025-04-01 VITALS
SYSTOLIC BLOOD PRESSURE: 116 MMHG | OXYGEN SATURATION: 98 % | WEIGHT: 149 LBS | BODY MASS INDEX: 25.58 KG/M2 | TEMPERATURE: 98.3 F | HEART RATE: 66 BPM | DIASTOLIC BLOOD PRESSURE: 74 MMHG

## 2025-04-01 DIAGNOSIS — K14.8 TONGUE LESION: Primary | ICD-10-CM

## 2025-04-01 PROCEDURE — G2211 COMPLEX E/M VISIT ADD ON: HCPCS | Performed by: FAMILY MEDICINE

## 2025-04-01 PROCEDURE — 99213 OFFICE O/P EST LOW 20 MIN: CPT | Performed by: FAMILY MEDICINE

## 2025-04-01 NOTE — PROGRESS NOTES
"Assessment & Plan   Problem List Items Addressed This Visit    None  Visit Diagnoses       Tongue lesion    -  Primary    Relevant Orders    Adult ENT  Referral - To a [facility] Location (Use POS/Location)           1. Tongue lesion (Primary)  Tongue lesions per dentist. Referral to ent for evaluation and consider biopsy.  - Adult ENT  Referral - To a [facility] Location (Use POS/Location)            BMI  Estimated body mass index is 25.58 kg/m  as calculated from the following:    Height as of 11/20/24: 1.626 m (5' 4\").    Weight as of this encounter: 67.6 kg (149 lb).         FUTURE APPOINTMENTS:       - Follow-up visit as needed. We manage her chronic medical care.    No follow-ups on file.    Constanza Johnson MD  [facility]    Subjective     Nursing Notes:   Rozina Florentino CMA  4/1/2025  2:52 PM  Signed  Chief Complaint   Patient presents with    Mouth Problem     She went to the dentist 2 months ago and they noticed small white spots in her mouth/on her tongue, they advised she see a specialists, advised this may be autoimmune       Pre-visit Screening:  Immunizations:  not up to date - prevnar 20 due  Colonoscopy:  is up to date  Mammogram: is up to date  Asthma Action Test/Plan:  NA  PHQ9:  NA  GAD7:  NA  Questioned patient about current smoking habits Pt. quit smoking some time ago.  Ok to leave detailed message on voice mail for today's visit only Yes, phone # [phone]       Gaby Aguilar is a 64 year old female who presents to clinic today for the following health issues   HPI     Here for tongue lesions on the right side of tongue recently noted by her dentist. History tobacco use.  She tried to schedule with oral surgeon but this wasn't accepted by her insurance.        Review of Systems   Constitutional, HEENT, cardiovascular, pulmonary, gi and gu systems are negative, except as otherwise noted.      Objective    /74 (BP Location: " Right arm, Patient Position: Sitting, Cuff Size: Adult Large)   Pulse 66   Temp 98.3  F (36.8  C) (Temporal)   Wt 67.6 kg (149 lb)   LMP 05/26/2010   SpO2 98%   BMI 25.58 kg/m    Body mass index is 25.58 kg/m .  Physical Exam   GENERAL: alert and no distress  MS: no gross musculoskeletal defects noted, no edema  PSYCH: mentation appears normal, affect normal/bright  Tongue and mucosa normal appearing, I wasn't able to see any lesions.

## 2025-04-01 NOTE — NURSING NOTE
Chief Complaint   Patient presents with    Mouth Problem     She went to the dentist 2 months ago and they noticed small white spots in her mouth/on her tongue, they advised she see a specialists, advised this may be autoimmune       Pre-visit Screening:  Immunizations:  not up to date - prevnar 20 due  Colonoscopy:  is up to date  Mammogram: is up to date  Asthma Action Test/Plan:  NA  PHQ9:  NA  GAD7:  NA  Questioned patient about current smoking habits Pt. quit smoking some time ago.  Ok to leave detailed message on voice mail for today's visit only Yes, phone # [phone]

## 2025-04-28 ENCOUNTER — TRANSFERRED RECORDS (OUTPATIENT)
Dept: FAMILY MEDICINE | Facility: CLINIC | Age: 65
End: 2025-04-28

## 2025-06-30 NOTE — PROGRESS NOTES
Preventive Care Visit  [facility]  Constanza Johnson MD, Family Medicine  2025       SUBJECTIVE:   Gaby is a 64 year old, presenting for the following:  Physical (Fasting. Says her chest hurts when she wakes up in morning.)      HPI      Here for a physical, fasting labs.  Complains of chest pain in the morning. Has a cardiologist.  This has been since January, not every day. Is a side sleeper, changes from side to side. When she is changing her chest hurts. Is like a muscle pull. Non tender now and when it happens. But then it goes away.   No pain with working out.  Breathing is ok. Has had an ablation 2 1/2 years ago. Will see her cardiologist in November.   History hysterectomy              Social History     Tobacco Use    Smoking status: Former     Current packs/day: 0.00     Average packs/day: 0.5 packs/day for 6.0 years (3.0 ttl pk-yrs)     Types: Cigarettes     Start date: 3/26/1976     Quit date: 3/26/1982     Years since quittin.2     Passive exposure: Current    Smokeless tobacco: Never   Substance Use Topics    Alcohol use: Yes     Alcohol/week: 7.0 standard drinks of alcohol     Types: 7 Standard drinks or equivalent per week              No data to display            No concerns.  Reviewed orders with patient.  Reviewed health maintenance and updated orders accordingly - Yes  BP Readings from Last 3 Encounters:   25 130/80   25 116/74   24 118/76    Wt Readings from Last 3 Encounters:   25 66.5 kg (146 lb 9.6 oz)   25 67.6 kg (149 lb)   24 69.4 kg (153 lb)                  Patient Active Problem List   Diagnosis    Allergic rhinitis    ACP (advance care planning)    Diffuse cystic mastopathy    Atrophic vaginitis    Atrial flutter (H)--followed by cardiology    Esophageal reflux    Localized osteoarthritis of hand    Pitted nails    Mass of upper inner quadrant of right breast    Adenomatous polyp of colon     Past Surgical  History:   Procedure Laterality Date    EP ABLATION FOCAL AFIB N/A 10/3/2023    Procedure: Ablation Atrial Fibrilation;  Surgeon: Jessica Way MD;  Location:  [facility]    HC CORRECT BUNION,SIMPLE Right     HC TOOTH EXTRACTION W/FORCEP  age 20    LAPAROSCOPIC HYSTERECTOMY TOTAL, BILATERAL SALPINGO-OOPHORECTOMY, COMBINED Bilateral 3/6/2019    Procedure: Single port total laparoscopic hysterectomy bilateral salpingo-oopherectomy;  Surgeon: Laisha Kathleen MD;  Location: [facility] OR       Social History     Tobacco Use    Smoking status: Former     Current packs/day: 0.00     Average packs/day: 0.5 packs/day for 6.0 years (3.0 ttl pk-yrs)     Types: Cigarettes     Start date: 3/26/1976     Quit date: 3/26/1982     Years since quittin.2     Passive exposure: Current    Smokeless tobacco: Never   Substance Use Topics    Alcohol use: Yes     Alcohol/week: 7.0 standard drinks of alcohol     Types: 7 Standard drinks or equivalent per week     Family History   Problem Relation Age of Onset    Cancer Mother         presacral cancer    Hypertension Mother     Hypertension Father     Prostate Cancer Father     Diabetes Maternal Grandmother     Breast Cancer Maternal Aunt     C.A.D. No family hx of     Cerebrovascular Disease No family hx of     Cancer - colorectal No family hx of          Current Outpatient Medications   Medication Sig Dispense Refill    Ascorbic Acid (VITAMIN C) 500 MG CAPS Unknown dose at this time      Calcium Carbonate-Vitamin D (CALCIUM + D) 600-200 MG-UNIT per tablet Take 2 tablets by mouth daily       Cholecalciferol (VITAMIN D-3 PO) Take by mouth.      ketoconazole (NIZORAL) 2 % external shampoo Use every 1-2 days when flared. Leave in few minutes before rinsing. Use twice weekly to prevent flares. 120 mL 11    metoprolol succinate ER (TOPROL XL) 25 MG 24 hr tablet Take 1 tablet (25 mg) by mouth daily. 90 tablet 4    Multiple Vitamins-Calcium (ONE-A-DAY WOMENS FORMULA)  TABS Take 1 tablet by mouth daily.      UNABLE TO FIND MEDICATION NAME: [employer] Allegra      VITAMIN E NATURAL PO          Breast Cancer Screening:    FHS-7:       8/17/2022     7:26 AM 8/24/2023    10:37 AM 9/24/2024    10:55 AM   Breast CA Risk Assessment (FHS-7)   Did any of your first-degree relatives have breast or ovarian cancer? No No No   Did any of your relatives have bilateral breast cancer? No No No   Did any man in your family have breast cancer? No No No   Did any woman in your family have breast and ovarian cancer? No No No   Did any woman in your family have breast cancer before age 50 y? No No No   Do you have 2 or more relatives with breast and/or ovarian cancer? Yes Yes Yes   Do you have 2 or more relatives with breast and/or bowel cancer? Yes Yes Yes     Mammogram order reviewed    Pertinent mammograms are reviewed under the imaging tab.      History of abnormal Pap smear: has had hysterectomy            Reviewed and updated as needed this visit by clinical staff   Tobacco  Allergies  Meds              Reviewed and updated as needed this visit by Provider                  Past Medical History:   Diagnosis Date    Allergic rhinitis 05/27/2011     Problem list name updated by automated process. Provider to review    Arthritis     hands    Diffuse cystic mastopathy 12/27/2012    History of basal cell carcinoma     Hx of atrial flutter 12/14/2018      Past Surgical History:   Procedure Laterality Date    EP ABLATION FOCAL AFIB N/A 10/3/2023    Procedure: Ablation Atrial Fibrilation;  Surgeon: Jessica Way MD;  Location:  [facility]    HC CORRECT BUNION,SIMPLE Right 1978    HC TOOTH EXTRACTION W/FORCEP  age 20    LAPAROSCOPIC HYSTERECTOMY TOTAL, BILATERAL SALPINGO-OOPHORECTOMY, COMBINED Bilateral 3/6/2019    Procedure: Single port total laparoscopic hysterectomy bilateral salpingo-oopherectomy;  Surgeon: Laisha Kathleen MD;  Location:  OR     Review of Systems    Review  "of Systems  Constitutional, HEENT, cardiovascular, pulmonary, gi and gu systems are negative, except as otherwise noted.    OBJECTIVE:   /80 (BP Location: Right arm, Patient Position: Sitting, Cuff Size: Adult Regular)   Pulse 58   Temp 97.1  F (36.2  C) (Temporal)   Ht 1.613 m (5' 3.5\")   Wt 66.5 kg (146 lb 9.6 oz)   LMP 05/26/2010   SpO2 99%   BMI 25.56 kg/m     Estimated body mass index is 25.56 kg/m  as calculated from the following:    Height as of this encounter: 1.613 m (5' 3.5\").    Weight as of this encounter: 66.5 kg (146 lb 9.6 oz).  Physical Exam  GENERAL: alert and no distress  EYES: Eyes grossly normal to inspection, PERRL and conjunctivae and sclerae normal  HENT: ear canals and TM's normal, nose and mouth without ulcers or lesions  NECK: no adenopathy, no asymmetry, masses, or scars  RESP: lungs clear to auscultation - no rales, rhonchi or wheezes  BREAST: normal without masses, tenderness or nipple discharge and no palpable axillary masses or adenopathy  CV: regular rate and rhythm, normal S1 S2, no S3 or S4, no murmur, click or rub, no peripheral edema  ABDOMEN: soft, nontender, no hepatosplenomegaly, no masses and bowel sounds normal   (female) w/bimanual: normal female external genitalia, normal urethral meatus, normal vaginal mucosa, and normal cervix/adnexa/uterus without masses or discharge  MS: no gross musculoskeletal defects noted, no edema  SKIN: no suspicious lesions or rashes  NEURO: Normal strength and tone, mentation intact and speech normal  PSYCH: mentation appears normal, affect normal/bright    Diagnostic Test Results:  Labs reviewed in Epic  No results found for this or any previous visit (from the past 24 hours).    ASSESSMENT/PLAN:   1. Encounter for routine history and physical exam in female patient (Primary)    - VENOUS COLLECTION  - Lipid Panel (BFP)  - Comprehensive Metobolic Panel (BFP)    2. Encounter for screening mammogram for breast cancer    - MA " "Screening Bilateral w/ [place]; Future  - Radiology Referral (Affiliate Use Only)    3. Screening for osteoporosis    - Radiology Referral (Affiliate Use Only)  - DX Bone Density; Future    4. Atrial flutter, unspecified type (H)  Followed by cardiology.    5. Other chest pain  No chest pain with exercise, advised to call her cardiologist to discuss if they want testing. No current chest pain.    6. Lichen planus  Was referred to ENT.     Patient has been advised of split billing requirements and indicates understanding: Yes      Counseling  Reviewed preventive health counseling, as reflected in patient instructions       Regular exercise       Healthy diet/nutrition      BMI  Estimated body mass index is 25.56 kg/m  as calculated from the following:    Height as of this encounter: 1.613 m (5' 3.5\").    Weight as of this encounter: 66.5 kg (146 lb 9.6 oz).         She reports that she quit smoking about 43 years ago. Her smoking use included cigarettes. She started smoking about 49 years ago. She has a 3 pack-year smoking history. She has been exposed to tobacco smoke. She has never used smokeless tobacco.          Signed Electronically by: Constanza Johnson MD  "

## 2025-07-01 ENCOUNTER — OFFICE VISIT (OUTPATIENT)
Dept: FAMILY MEDICINE | Facility: CLINIC | Age: 65
End: 2025-07-01

## 2025-07-01 ENCOUNTER — RESULTS FOLLOW-UP (OUTPATIENT)
Dept: FAMILY MEDICINE | Facility: CLINIC | Age: 65
End: 2025-07-01

## 2025-07-01 VITALS
OXYGEN SATURATION: 99 % | SYSTOLIC BLOOD PRESSURE: 130 MMHG | HEART RATE: 58 BPM | WEIGHT: 146.6 LBS | DIASTOLIC BLOOD PRESSURE: 80 MMHG | BODY MASS INDEX: 25.03 KG/M2 | TEMPERATURE: 97.1 F | HEIGHT: 64 IN

## 2025-07-01 DIAGNOSIS — R73.09 HIGH GLUCOSE: ICD-10-CM

## 2025-07-01 DIAGNOSIS — Z13.820 SCREENING FOR OSTEOPOROSIS: ICD-10-CM

## 2025-07-01 DIAGNOSIS — Z12.31 ENCOUNTER FOR SCREENING MAMMOGRAM FOR BREAST CANCER: ICD-10-CM

## 2025-07-01 DIAGNOSIS — Z00.00 ENCOUNTER FOR ROUTINE HISTORY AND PHYSICAL EXAM IN FEMALE PATIENT: Primary | ICD-10-CM

## 2025-07-01 DIAGNOSIS — L43.9 LICHEN PLANUS: ICD-10-CM

## 2025-07-01 DIAGNOSIS — I48.92 ATRIAL FLUTTER, UNSPECIFIED TYPE (H): ICD-10-CM

## 2025-07-01 DIAGNOSIS — R07.89 OTHER CHEST PAIN: ICD-10-CM

## 2025-07-01 PROBLEM — Z86.79 HX OF ATRIAL FLUTTER: Status: RESOLVED | Noted: 2018-12-14 | Resolved: 2025-07-01

## 2025-07-01 LAB
ALBUMIN SERPL-MCNC: 4.8 G/DL (ref 3.6–5.1)
ALP SERPL-CCNC: 61 U/L (ref 33–130)
ALT 1742-6: 17 U/L (ref 0–32)
AST 1920-8: 19 U/L (ref 0–35)
BILIRUB SERPL-MCNC: 1.3 MG/DL (ref 0.2–1.2)
BUN SERPL-MCNC: 11 MG/DL (ref 7–25)
BUN/CREATININE RATIO: 15 (ref 6–32)
CALCIUM SERPL-MCNC: 9.8 MG/DL (ref 8.6–10.3)
CHLORIDE SERPLBLD-SCNC: 102.9 MMOL/L (ref 98–110)
CHOLEST SERPL-MCNC: 223 MG/DL (ref 0–199)
CHOLEST/HDLC SERPL: 4 {RATIO} (ref 0–5)
CO2 SERPL-SCNC: 29 MMOL/L (ref 20–32)
CREAT SERPL-MCNC: 0.75 MG/DL (ref 0.6–1.3)
GLUCOSE SERPL-MCNC: 108 MG/DL (ref 60–99)
HDLC SERPL-MCNC: 55 MG/DL (ref 40–150)
HEMOGLOBIN A1C: 5.4 % (ref 4–5.6)
LDLC SERPL CALC-MCNC: 126 MG/DL (ref 0–129)
POTASSIUM SERPL-SCNC: 4.5 MMOL/L (ref 3.5–5.3)
PROT SERPL-MCNC: 7.2 G/DL (ref 6.1–8.1)
SODIUM SERPL-SCNC: 140.5 MMOL/L (ref 135–146)
TRIGL SERPL-MCNC: 209 MG/DL (ref 0–149)

## 2025-07-01 PROCEDURE — 83036 HEMOGLOBIN GLYCOSYLATED A1C: CPT | Performed by: FAMILY MEDICINE

## 2025-07-01 PROCEDURE — 80053 COMPREHEN METABOLIC PANEL: CPT | Performed by: FAMILY MEDICINE

## 2025-07-01 PROCEDURE — 36415 COLL VENOUS BLD VENIPUNCTURE: CPT | Performed by: FAMILY MEDICINE

## 2025-07-01 PROCEDURE — 80061 LIPID PANEL: CPT | Performed by: FAMILY MEDICINE

## 2025-07-01 PROCEDURE — 99396 PREV VISIT EST AGE 40-64: CPT | Performed by: FAMILY MEDICINE

## 2025-07-01 NOTE — NURSING NOTE
Chief Complaint   Patient presents with    Physical     Fasting. Says her chest hurts when she wakes up in morning.     Pre-visit Screening:  Immunizations:  not up to date - Covid and Pnuemo and RSV  Colonoscopy:  is up to date  Mammogram: ordered today  Asthma Action Test/Plan:  NA  PHQ9:  NA  GAD7:  NA  Questioned patient about current smoking habits Pt. has never smoked.  Ok to leave detailed message on voice mail for today's visit only Yes, phone # [phone] (home)

## 2025-08-06 ENCOUNTER — OFFICE VISIT (OUTPATIENT)
Dept: DERMATOLOGY | Facility: CLINIC | Age: 65
End: 2025-08-06
Payer: COMMERCIAL

## 2025-08-06 DIAGNOSIS — L57.0 AK (ACTINIC KERATOSIS): Primary | ICD-10-CM

## 2025-08-06 DIAGNOSIS — L82.1 SEBORRHEIC KERATOSES: ICD-10-CM

## (undated) DEVICE — LINEN HALF SHEET 5512

## (undated) DEVICE — SPONGE LAP 18X18" X8435

## (undated) DEVICE — CATH BLAZER DX-20 DUO-DECAPOLA

## (undated) DEVICE — INTRODUCER SHEATH GREEN 6.5FRX11CM .038IN PSI-6F-11-038ACT

## (undated) DEVICE — GLOVE PROTEXIS W/NEU-THERA 7.5  2D73TE75

## (undated) DEVICE — GUIDEWIRE TRNSEPT 0.014 X35CM SAFE SE

## (undated) DEVICE — CATH EP 6FR 2MM TIP 2-8-2 115C

## (undated) DEVICE — INTRODUCER SHEATH FAST-CATH SWARTZ 8.5FRX63CM SL1 CVD 406849

## (undated) DEVICE — TUBE SET SMARKABLATE IRRIGATION

## (undated) DEVICE — PACK LAP HYST RIDGES

## (undated) DEVICE — LINEN POUCH DBL 5427

## (undated) DEVICE — INTRODUCER SHEATH FAST-CATH 9FRX12CM 406116

## (undated) DEVICE — PATCH CARTO 3 EXTERNAL REFERENCE 3D MAPPING CREFP6

## (undated) DEVICE — PACK EP SRG PROC LF DISP SAN32EPFSR

## (undated) DEVICE — CATH NAV PENTARAY F CURVE

## (undated) DEVICE — ESU HANDPIECE BIPOLAR THUNDERBEAT FC 5MMX35CM TB-0535FC

## (undated) DEVICE — LINEN FULL SHEET 5511

## (undated) DEVICE — DEVICE ENDO STITCH APPLIER 10MM 173016

## (undated) DEVICE — RAD INTRODUCER KIT MICRO 5FRX10CM .018 NITINOL G/W

## (undated) DEVICE — CATH EP CATH EP REPRO DAIG RSPN FX CRV DX EP C

## (undated) DEVICE — LINEN TOWEL PACK X5 5464

## (undated) DEVICE — DEFIB PRO-PADZ LVP LQD GEL ADULT 8900-2105-01

## (undated) DEVICE — EVAC SYSTEM CLEAR FLOW SC082500

## (undated) DEVICE — ESU PENCIL W/HOLSTER E2350H

## (undated) DEVICE — LINEN DRAPE 54X72" 5467

## (undated) DEVICE — NDL TRANSSEPTAL BRK 18GA 71CM BRK CVD 407200

## (undated) DEVICE — SU MONOCRYL 4-0 PS-2 27" UND Y426H

## (undated) DEVICE — GLOVE BIOGEL SKINSENSE PC SZ 7.0 31470

## (undated) DEVICE — SU ENDO STITCH POLYSORB 0 48" 170052

## (undated) DEVICE — SOL NACL 0.9% IRRIG 1000ML BOTTLE 2F7124

## (undated) DEVICE — NDL 25GA 1.5" 305127

## (undated) DEVICE — PAD CHUX UNDERPAD 30X36" P3036C

## (undated) DEVICE — SU VICRYL 0 UR-6 27" J603H

## (undated) DEVICE — RETR ELEV / UTERINE MANIPULATOR V-CARE MED CUP 60-6085-201A

## (undated) DEVICE — SUCTION CANISTER MEDIVAC LINER 3000ML W/LID 65651-530

## (undated) DEVICE — SOL NACL 0.9% IRRIG 3000ML BAG 2B7477

## (undated) DEVICE — CATH SOUNDSTAR 8FRX90CM 10439011

## (undated) DEVICE — CATH THERMOCOOL SMARTTOUCH SF DF CURVE

## (undated) DEVICE — TROCAR TRIPORT PLUS OLYMPUS SINGLE ACCESS WA58010T

## (undated) DEVICE — GLOVE PROTEXIS MICRO 7.0  2D73PM70

## (undated) DEVICE — GOWN XLG DISP 9545

## (undated) DEVICE — GLOVE PROTEXIS MICRO 6.5  2D73PM65

## (undated) DEVICE — BAG CLEAR TRASH 1.3M 39X33" P4040C

## (undated) RX ORDER — FENTANYL CITRATE 50 UG/ML
INJECTION, SOLUTION INTRAMUSCULAR; INTRAVENOUS
Status: DISPENSED
Start: 2019-03-06

## (undated) RX ORDER — HEPARIN SODIUM 1000 [USP'U]/ML
INJECTION, SOLUTION INTRAVENOUS; SUBCUTANEOUS
Status: DISPENSED
Start: 2023-10-03

## (undated) RX ORDER — KETOROLAC TROMETHAMINE 30 MG/ML
INJECTION, SOLUTION INTRAMUSCULAR; INTRAVENOUS
Status: DISPENSED
Start: 2023-10-03

## (undated) RX ORDER — OXYCODONE HYDROCHLORIDE 5 MG/1
TABLET ORAL
Status: DISPENSED
Start: 2019-03-06

## (undated) RX ORDER — EPHEDRINE SULFATE 50 MG/ML
INJECTION, SOLUTION INTRAMUSCULAR; INTRAVENOUS; SUBCUTANEOUS
Status: DISPENSED
Start: 2023-10-03

## (undated) RX ORDER — IPRATROPIUM BROMIDE AND ALBUTEROL SULFATE 2.5; .5 MG/3ML; MG/3ML
SOLUTION RESPIRATORY (INHALATION)
Status: DISPENSED
Start: 2019-03-04

## (undated) RX ORDER — ACETAMINOPHEN 325 MG/1
TABLET ORAL
Status: DISPENSED
Start: 2023-10-03

## (undated) RX ORDER — HEPARIN SODIUM 200 [USP'U]/100ML
INJECTION, SOLUTION INTRAVENOUS
Status: DISPENSED
Start: 2023-10-03

## (undated) RX ORDER — BUPIVACAINE HYDROCHLORIDE 5 MG/ML
INJECTION, SOLUTION EPIDURAL; INTRACAUDAL
Status: DISPENSED
Start: 2019-03-06

## (undated) RX ORDER — PROPOFOL 10 MG/ML
INJECTION, EMULSION INTRAVENOUS
Status: DISPENSED
Start: 2019-03-06

## (undated) RX ORDER — FENTANYL CITRATE 50 UG/ML
INJECTION, SOLUTION INTRAMUSCULAR; INTRAVENOUS
Status: DISPENSED
Start: 2023-10-03

## (undated) RX ORDER — PROTAMINE SULFATE 10 MG/ML
INJECTION, SOLUTION INTRAVENOUS
Status: DISPENSED
Start: 2023-10-03

## (undated) RX ORDER — LIDOCAINE HYDROCHLORIDE 10 MG/ML
INJECTION, SOLUTION EPIDURAL; INFILTRATION; INTRACAUDAL; PERINEURAL
Status: DISPENSED
Start: 2023-10-03

## (undated) RX ORDER — MINERAL OIL
OIL (ML) MISCELLANEOUS
Status: DISPENSED
Start: 2019-03-06

## (undated) RX ORDER — IBUPROFEN 600 MG/1
TABLET, FILM COATED ORAL
Status: DISPENSED
Start: 2019-03-06

## (undated) RX ORDER — CEFAZOLIN SODIUM 2 G/100ML
INJECTION, SOLUTION INTRAVENOUS
Status: DISPENSED
Start: 2019-03-06